# Patient Record
Sex: FEMALE | Race: OTHER | HISPANIC OR LATINO | URBAN - METROPOLITAN AREA
[De-identification: names, ages, dates, MRNs, and addresses within clinical notes are randomized per-mention and may not be internally consistent; named-entity substitution may affect disease eponyms.]

---

## 2018-04-13 ENCOUNTER — EMERGENCY (EMERGENCY)
Facility: HOSPITAL | Age: 83
LOS: 1 days | Discharge: HOME | End: 2018-04-13
Attending: EMERGENCY MEDICINE | Admitting: EMERGENCY MEDICINE
Payer: MEDICAID

## 2018-04-13 VITALS
HEART RATE: 58 BPM | RESPIRATION RATE: 16 BRPM | SYSTOLIC BLOOD PRESSURE: 145 MMHG | DIASTOLIC BLOOD PRESSURE: 79 MMHG | TEMPERATURE: 98 F | OXYGEN SATURATION: 100 %

## 2018-04-13 VITALS
SYSTOLIC BLOOD PRESSURE: 160 MMHG | OXYGEN SATURATION: 100 % | DIASTOLIC BLOOD PRESSURE: 62 MMHG | RESPIRATION RATE: 15 BRPM | TEMPERATURE: 98 F | HEART RATE: 60 BPM

## 2018-04-13 DIAGNOSIS — Z90.49 ACQUIRED ABSENCE OF OTHER SPECIFIED PARTS OF DIGESTIVE TRACT: Chronic | ICD-10-CM

## 2018-04-13 LAB
ALBUMIN SERPL ELPH-MCNC: 4.4 G/DL — SIGNIFICANT CHANGE UP (ref 3.3–5)
ALP SERPL-CCNC: 84 U/L — SIGNIFICANT CHANGE UP (ref 40–120)
ALT FLD-CCNC: 16 U/L — SIGNIFICANT CHANGE UP (ref 4–33)
APPEARANCE UR: CLEAR — SIGNIFICANT CHANGE UP
APTT BLD: 28.8 SEC — SIGNIFICANT CHANGE UP (ref 27.5–37.4)
AST SERPL-CCNC: 20 U/L — SIGNIFICANT CHANGE UP (ref 4–32)
BASOPHILS # BLD AUTO: 0.03 K/UL — SIGNIFICANT CHANGE UP (ref 0–0.2)
BASOPHILS NFR BLD AUTO: 0.2 % — SIGNIFICANT CHANGE UP (ref 0–2)
BILIRUB SERPL-MCNC: 0.4 MG/DL — SIGNIFICANT CHANGE UP (ref 0.2–1.2)
BILIRUB UR-MCNC: NEGATIVE — SIGNIFICANT CHANGE UP
BLOOD UR QL VISUAL: NEGATIVE — SIGNIFICANT CHANGE UP
BUN SERPL-MCNC: 12 MG/DL — SIGNIFICANT CHANGE UP (ref 7–23)
CALCIUM SERPL-MCNC: 9.9 MG/DL — SIGNIFICANT CHANGE UP (ref 8.4–10.5)
CHLORIDE SERPL-SCNC: 101 MMOL/L — SIGNIFICANT CHANGE UP (ref 98–107)
CO2 SERPL-SCNC: 23 MMOL/L — SIGNIFICANT CHANGE UP (ref 22–31)
COLOR SPEC: SIGNIFICANT CHANGE UP
CREAT SERPL-MCNC: 0.73 MG/DL — SIGNIFICANT CHANGE UP (ref 0.5–1.3)
EOSINOPHIL # BLD AUTO: 0.06 K/UL — SIGNIFICANT CHANGE UP (ref 0–0.5)
EOSINOPHIL NFR BLD AUTO: 0.5 % — SIGNIFICANT CHANGE UP (ref 0–6)
GLUCOSE SERPL-MCNC: 125 MG/DL — HIGH (ref 70–99)
GLUCOSE UR-MCNC: NEGATIVE — SIGNIFICANT CHANGE UP
HCT VFR BLD CALC: 41.7 % — SIGNIFICANT CHANGE UP (ref 34.5–45)
HGB BLD-MCNC: 13.6 G/DL — SIGNIFICANT CHANGE UP (ref 11.5–15.5)
IMM GRANULOCYTES # BLD AUTO: 0.05 # — SIGNIFICANT CHANGE UP
IMM GRANULOCYTES NFR BLD AUTO: 0.4 % — SIGNIFICANT CHANGE UP (ref 0–1.5)
INR BLD: 0.91 — SIGNIFICANT CHANGE UP (ref 0.88–1.17)
KETONES UR-MCNC: NEGATIVE — SIGNIFICANT CHANGE UP
LEUKOCYTE ESTERASE UR-ACNC: NEGATIVE — SIGNIFICANT CHANGE UP
LYMPHOCYTES # BLD AUTO: 25 % — SIGNIFICANT CHANGE UP (ref 13–44)
LYMPHOCYTES # BLD AUTO: 3.2 K/UL — SIGNIFICANT CHANGE UP (ref 1–3.3)
MCHC RBC-ENTMCNC: 29.9 PG — SIGNIFICANT CHANGE UP (ref 27–34)
MCHC RBC-ENTMCNC: 32.6 % — SIGNIFICANT CHANGE UP (ref 32–36)
MCV RBC AUTO: 91.6 FL — SIGNIFICANT CHANGE UP (ref 80–100)
MONOCYTES # BLD AUTO: 0.57 K/UL — SIGNIFICANT CHANGE UP (ref 0–0.9)
MONOCYTES NFR BLD AUTO: 4.4 % — SIGNIFICANT CHANGE UP (ref 2–14)
NEUTROPHILS # BLD AUTO: 8.91 K/UL — HIGH (ref 1.8–7.4)
NEUTROPHILS NFR BLD AUTO: 69.5 % — SIGNIFICANT CHANGE UP (ref 43–77)
NITRITE UR-MCNC: NEGATIVE — SIGNIFICANT CHANGE UP
NRBC # FLD: 0 — SIGNIFICANT CHANGE UP
PH UR: 6 — SIGNIFICANT CHANGE UP (ref 4.6–8)
PLATELET # BLD AUTO: 310 K/UL — SIGNIFICANT CHANGE UP (ref 150–400)
PMV BLD: 9.9 FL — SIGNIFICANT CHANGE UP (ref 7–13)
POTASSIUM SERPL-MCNC: 3.7 MMOL/L — SIGNIFICANT CHANGE UP (ref 3.5–5.3)
POTASSIUM SERPL-SCNC: 3.7 MMOL/L — SIGNIFICANT CHANGE UP (ref 3.5–5.3)
PROT SERPL-MCNC: 8.3 G/DL — SIGNIFICANT CHANGE UP (ref 6–8.3)
PROT UR-MCNC: NEGATIVE MG/DL — SIGNIFICANT CHANGE UP
PROTHROM AB SERPL-ACNC: 10.5 SEC — SIGNIFICANT CHANGE UP (ref 9.8–13.1)
RBC # BLD: 4.55 M/UL — SIGNIFICANT CHANGE UP (ref 3.8–5.2)
RBC # FLD: 12.5 % — SIGNIFICANT CHANGE UP (ref 10.3–14.5)
RBC CASTS # UR COMP ASSIST: SIGNIFICANT CHANGE UP (ref 0–?)
SODIUM SERPL-SCNC: 141 MMOL/L — SIGNIFICANT CHANGE UP (ref 135–145)
SP GR SPEC: 1.01 — SIGNIFICANT CHANGE UP (ref 1–1.04)
SQUAMOUS # UR AUTO: SIGNIFICANT CHANGE UP
TROPONIN T SERPL-MCNC: < 0.06 NG/ML — SIGNIFICANT CHANGE UP (ref 0–0.06)
UROBILINOGEN FLD QL: NORMAL MG/DL — SIGNIFICANT CHANGE UP
WBC # BLD: 12.82 K/UL — HIGH (ref 3.8–10.5)
WBC # FLD AUTO: 12.82 K/UL — HIGH (ref 3.8–10.5)
WBC UR QL: SIGNIFICANT CHANGE UP (ref 0–?)

## 2018-04-13 PROCEDURE — 70450 CT HEAD/BRAIN W/O DYE: CPT | Mod: 26

## 2018-04-13 PROCEDURE — 74177 CT ABD & PELVIS W/CONTRAST: CPT | Mod: 26

## 2018-04-13 PROCEDURE — 99284 EMERGENCY DEPT VISIT MOD MDM: CPT

## 2018-04-13 RX ORDER — FAMOTIDINE 10 MG/ML
20 INJECTION INTRAVENOUS DAILY
Qty: 0 | Refills: 0 | Status: DISCONTINUED | OUTPATIENT
Start: 2018-04-13 | End: 2018-04-17

## 2018-04-13 RX ORDER — SODIUM CHLORIDE 9 MG/ML
500 INJECTION INTRAMUSCULAR; INTRAVENOUS; SUBCUTANEOUS ONCE
Qty: 0 | Refills: 0 | Status: COMPLETED | OUTPATIENT
Start: 2018-04-13 | End: 2018-04-13

## 2018-04-13 RX ORDER — ONDANSETRON 8 MG/1
4 TABLET, FILM COATED ORAL ONCE
Qty: 0 | Refills: 0 | Status: COMPLETED | OUTPATIENT
Start: 2018-04-13 | End: 2018-04-13

## 2018-04-13 RX ORDER — ACETAMINOPHEN 500 MG
1000 TABLET ORAL ONCE
Qty: 0 | Refills: 0 | Status: COMPLETED | OUTPATIENT
Start: 2018-04-13 | End: 2018-04-13

## 2018-04-13 RX ORDER — LIDOCAINE 4 G/100G
10 CREAM TOPICAL ONCE
Qty: 0 | Refills: 0 | Status: COMPLETED | OUTPATIENT
Start: 2018-04-13 | End: 2018-04-13

## 2018-04-13 RX ADMIN — FAMOTIDINE 20 MILLIGRAM(S): 10 INJECTION INTRAVENOUS at 18:03

## 2018-04-13 RX ADMIN — LIDOCAINE 10 MILLILITER(S): 4 CREAM TOPICAL at 18:03

## 2018-04-13 RX ADMIN — ONDANSETRON 4 MILLIGRAM(S): 8 TABLET, FILM COATED ORAL at 18:03

## 2018-04-13 RX ADMIN — Medication 30 MILLILITER(S): at 18:03

## 2018-04-13 RX ADMIN — Medication 400 MILLIGRAM(S): at 18:03

## 2018-04-13 RX ADMIN — Medication 1000 MILLIGRAM(S): at 18:40

## 2018-04-13 RX ADMIN — SODIUM CHLORIDE 500 MILLILITER(S): 9 INJECTION INTRAMUSCULAR; INTRAVENOUS; SUBCUTANEOUS at 18:03

## 2018-04-13 NOTE — ED PROVIDER NOTE - PROGRESS NOTE DETAILS
Dr. Lake: Pt states feeling better. Tolerating PO. Given copies of results and advised f/u with GI and Neuro.

## 2018-04-13 NOTE — ED ADULT TRIAGE NOTE - CHIEF COMPLAINT QUOTE
son in law states" My mother in law has a head ache and dizzy and felt almost passed out at home." felt like every thing is becoming dark  for few seconds. patient c/o abdominal pain along with head ache with nausea. denies v/d. FIT called for stroke eval. no code stroke called.

## 2018-04-13 NOTE — ED PROVIDER NOTE - CARE PLAN
Principal Discharge DX:	Headache  Assessment and plan of treatment:	During your ED visit you were evaluated for headache, and abdominal pain. You had blood work and  a ct scan of the abdomen and head. You were provided with the results.  Your CT scan showed a slight dilated common bile duct. Follow up with your pcp within 1 week. Follow up with GI within 1 month if you have continuous symptoms a list has been provide to you . Return to the ED if you exhibit any new, continued or worsening symptoms.  Secondary Diagnosis:	Abdominal pain

## 2018-04-13 NOTE — ED PROVIDER NOTE - ATTENDING CONTRIBUTION TO CARE
Pt was seen and evaluated by me. Pt states today started to get pain to top of head with some dizziness lasting around 20 mins and then started to get RUQ abd pain with nausea. Pt denies any vomiting. Pt denies any fever, chills, vomiting, SOB, chest pain, or diarrhea. Pt denies any eye pain. Lungs CTA b/l. RRR. Abd soft with RUQ abd tenderness. No focal deficits. PERRL.

## 2018-04-13 NOTE — ED PROVIDER NOTE - OBJECTIVE STATEMENT
83 y/o female with PMHx of HTN, HLD, and Glaucoma presents to ED for headache, nausea, and abd pain today. Pt states starting this morning having an achy headache to the top of her head with some dizziness and then RUQ abd pain. Pt notes the headache lasted for around 20 mins the resolved but RUQ abd pain continued. Pt denies any fever, chills, SOB, chest pain, vomiting, or diarrhea. Denies any eye pain or current vision changes. Pt has a history of a cholecystectomy.

## 2018-04-13 NOTE — ED PROVIDER NOTE - PLAN OF CARE
During your ED visit you were evaluated for headache, and abdominal pain. You had blood work and  a ct scan of the abdomen and head. You were provided with the results.  Your CT scan showed a slight dilated common bile duct. Follow up with your pcp within 1 week. Follow up with GI within 1 month if you have continuous symptoms a list has been provide to you . Return to the ED if you exhibit any new, continued or worsening symptoms.

## 2018-04-13 NOTE — ED PROVIDER NOTE - MEDICAL DECISION MAKING DETAILS
83 y/o female with episode of headache with nausea and RUQ abd pain. Concern for gastritis, CBD stone, or other abd etiology. Labs, EKG, CT, UA, Analgesia.

## 2018-04-13 NOTE — ED ADULT NURSE REASSESSMENT NOTE - NS ED NURSE REASSESS COMMENT FT1
pt returned by CT, awake, a/ox3, denies any pain at this time, vitally stable will continue to monitor

## 2019-05-09 ENCOUNTER — INPATIENT (INPATIENT)
Facility: HOSPITAL | Age: 84
LOS: 9 days | Discharge: HOME | DRG: 149 | End: 2019-05-19
Attending: INTERNAL MEDICINE | Admitting: INTERNAL MEDICINE
Payer: MEDICAID

## 2019-05-09 VITALS
OXYGEN SATURATION: 98 % | TEMPERATURE: 97 F | HEART RATE: 60 BPM | RESPIRATION RATE: 18 BRPM | DIASTOLIC BLOOD PRESSURE: 84 MMHG | WEIGHT: 164.91 LBS | SYSTOLIC BLOOD PRESSURE: 174 MMHG

## 2019-05-09 DIAGNOSIS — I10 ESSENTIAL (PRIMARY) HYPERTENSION: ICD-10-CM

## 2019-05-09 DIAGNOSIS — R10.9 UNSPECIFIED ABDOMINAL PAIN: ICD-10-CM

## 2019-05-09 DIAGNOSIS — Z90.49 ACQUIRED ABSENCE OF OTHER SPECIFIED PARTS OF DIGESTIVE TRACT: Chronic | ICD-10-CM

## 2019-05-09 DIAGNOSIS — R42 DIZZINESS AND GIDDINESS: ICD-10-CM

## 2019-05-09 DIAGNOSIS — R74.8 ABNORMAL LEVELS OF OTHER SERUM ENZYMES: ICD-10-CM

## 2019-05-09 DIAGNOSIS — R93.89 ABNORMAL FINDINGS ON DIAGNOSTIC IMAGING OF OTHER SPECIFIED BODY STRUCTURES: ICD-10-CM

## 2019-05-09 DIAGNOSIS — Z29.9 ENCOUNTER FOR PROPHYLACTIC MEASURES, UNSPECIFIED: ICD-10-CM

## 2019-05-09 LAB
ALBUMIN SERPL ELPH-MCNC: 4 G/DL — SIGNIFICANT CHANGE UP (ref 3.5–5)
ALP SERPL-CCNC: 83 U/L — SIGNIFICANT CHANGE UP (ref 40–120)
ALT FLD-CCNC: 23 U/L DA — SIGNIFICANT CHANGE UP (ref 10–60)
ANION GAP SERPL CALC-SCNC: 8 MMOL/L — SIGNIFICANT CHANGE UP (ref 5–17)
APPEARANCE UR: CLEAR — SIGNIFICANT CHANGE UP
AST SERPL-CCNC: 18 U/L — SIGNIFICANT CHANGE UP (ref 10–40)
BASOPHILS # BLD AUTO: 0.04 K/UL — SIGNIFICANT CHANGE UP (ref 0–0.2)
BASOPHILS NFR BLD AUTO: 0.3 % — SIGNIFICANT CHANGE UP (ref 0–2)
BILIRUB SERPL-MCNC: 0.3 MG/DL — SIGNIFICANT CHANGE UP (ref 0.2–1.2)
BILIRUB UR-MCNC: NEGATIVE — SIGNIFICANT CHANGE UP
BUN SERPL-MCNC: 27 MG/DL — HIGH (ref 7–18)
CALCIUM SERPL-MCNC: 9.9 MG/DL — SIGNIFICANT CHANGE UP (ref 8.4–10.5)
CHLORIDE SERPL-SCNC: 108 MMOL/L — SIGNIFICANT CHANGE UP (ref 96–108)
CK MB BLD-MCNC: <3.3 % — SIGNIFICANT CHANGE UP (ref 0–3.5)
CK MB CFR SERPL CALC: <1 NG/ML — SIGNIFICANT CHANGE UP (ref 0–3.6)
CK SERPL-CCNC: 30 U/L — SIGNIFICANT CHANGE UP (ref 21–215)
CK SERPL-CCNC: 34 U/L — SIGNIFICANT CHANGE UP (ref 21–215)
CO2 SERPL-SCNC: 25 MMOL/L — SIGNIFICANT CHANGE UP (ref 22–31)
COLOR SPEC: YELLOW — SIGNIFICANT CHANGE UP
CREAT SERPL-MCNC: 1.05 MG/DL — SIGNIFICANT CHANGE UP (ref 0.5–1.3)
DIFF PNL FLD: NEGATIVE — SIGNIFICANT CHANGE UP
EOSINOPHIL # BLD AUTO: 0.15 K/UL — SIGNIFICANT CHANGE UP (ref 0–0.5)
EOSINOPHIL NFR BLD AUTO: 1.1 % — SIGNIFICANT CHANGE UP (ref 0–6)
GLUCOSE SERPL-MCNC: 136 MG/DL — HIGH (ref 70–99)
GLUCOSE UR QL: NEGATIVE — SIGNIFICANT CHANGE UP
HCT VFR BLD CALC: 40.9 % — SIGNIFICANT CHANGE UP (ref 34.5–45)
HGB BLD-MCNC: 13.5 G/DL — SIGNIFICANT CHANGE UP (ref 11.5–15.5)
IMM GRANULOCYTES NFR BLD AUTO: 0.4 % — SIGNIFICANT CHANGE UP (ref 0–1.5)
KETONES UR-MCNC: NEGATIVE — SIGNIFICANT CHANGE UP
LACTATE SERPL-SCNC: 2.5 MMOL/L — HIGH (ref 0.7–2)
LEUKOCYTE ESTERASE UR-ACNC: ABNORMAL
LIDOCAIN IGE QN: 276 U/L — SIGNIFICANT CHANGE UP (ref 73–393)
LYMPHOCYTES # BLD AUTO: 2.81 K/UL — SIGNIFICANT CHANGE UP (ref 1–3.3)
LYMPHOCYTES # BLD AUTO: 20.3 % — SIGNIFICANT CHANGE UP (ref 13–44)
MCHC RBC-ENTMCNC: 30.1 PG — SIGNIFICANT CHANGE UP (ref 27–34)
MCHC RBC-ENTMCNC: 33 GM/DL — SIGNIFICANT CHANGE UP (ref 32–36)
MCV RBC AUTO: 91.3 FL — SIGNIFICANT CHANGE UP (ref 80–100)
MONOCYTES # BLD AUTO: 1.11 K/UL — HIGH (ref 0–0.9)
MONOCYTES NFR BLD AUTO: 8 % — SIGNIFICANT CHANGE UP (ref 2–14)
NEUTROPHILS # BLD AUTO: 9.68 K/UL — HIGH (ref 1.8–7.4)
NEUTROPHILS NFR BLD AUTO: 69.9 % — SIGNIFICANT CHANGE UP (ref 43–77)
NITRITE UR-MCNC: NEGATIVE — SIGNIFICANT CHANGE UP
NRBC # BLD: 0 /100 WBCS — SIGNIFICANT CHANGE UP (ref 0–0)
PH UR: 7 — SIGNIFICANT CHANGE UP (ref 5–8)
PLATELET # BLD AUTO: 279 K/UL — SIGNIFICANT CHANGE UP (ref 150–400)
POTASSIUM SERPL-MCNC: 3.9 MMOL/L — SIGNIFICANT CHANGE UP (ref 3.5–5.3)
POTASSIUM SERPL-SCNC: 3.9 MMOL/L — SIGNIFICANT CHANGE UP (ref 3.5–5.3)
PROT SERPL-MCNC: 8.1 G/DL — SIGNIFICANT CHANGE UP (ref 6–8.3)
PROT UR-MCNC: NEGATIVE — SIGNIFICANT CHANGE UP
RBC # BLD: 4.48 M/UL — SIGNIFICANT CHANGE UP (ref 3.8–5.2)
RBC # FLD: 12.3 % — SIGNIFICANT CHANGE UP (ref 10.3–14.5)
SODIUM SERPL-SCNC: 141 MMOL/L — SIGNIFICANT CHANGE UP (ref 135–145)
SP GR SPEC: 1.01 — SIGNIFICANT CHANGE UP (ref 1.01–1.02)
TROPONIN I SERPL-MCNC: 0.05 NG/ML — HIGH (ref 0–0.04)
TROPONIN I SERPL-MCNC: 0.07 NG/ML — HIGH (ref 0–0.04)
UROBILINOGEN FLD QL: NEGATIVE — SIGNIFICANT CHANGE UP
WBC # BLD: 13.85 K/UL — HIGH (ref 3.8–10.5)
WBC # FLD AUTO: 13.85 K/UL — HIGH (ref 3.8–10.5)

## 2019-05-09 PROCEDURE — 70450 CT HEAD/BRAIN W/O DYE: CPT | Mod: 26

## 2019-05-09 PROCEDURE — 99285 EMERGENCY DEPT VISIT HI MDM: CPT

## 2019-05-09 PROCEDURE — 74177 CT ABD & PELVIS W/CONTRAST: CPT | Mod: 26

## 2019-05-09 PROCEDURE — 93010 ELECTROCARDIOGRAM REPORT: CPT

## 2019-05-09 RX ORDER — METOCLOPRAMIDE HCL 10 MG
10 TABLET ORAL ONCE
Refills: 0 | Status: COMPLETED | OUTPATIENT
Start: 2019-05-09 | End: 2019-05-09

## 2019-05-09 RX ORDER — MEMANTINE HYDROCHLORIDE 10 MG/1
10 TABLET ORAL DAILY
Refills: 0 | Status: DISCONTINUED | OUTPATIENT
Start: 2019-05-09 | End: 2019-05-19

## 2019-05-09 RX ORDER — DIAZEPAM 5 MG
2 TABLET ORAL ONCE
Refills: 0 | Status: DISCONTINUED | OUTPATIENT
Start: 2019-05-09 | End: 2019-05-09

## 2019-05-09 RX ORDER — ASPIRIN/CALCIUM CARB/MAGNESIUM 324 MG
81 TABLET ORAL DAILY
Refills: 0 | Status: DISCONTINUED | OUTPATIENT
Start: 2019-05-09 | End: 2019-05-19

## 2019-05-09 RX ORDER — SODIUM CHLORIDE 9 MG/ML
1000 INJECTION, SOLUTION INTRAVENOUS ONCE
Refills: 0 | Status: COMPLETED | OUTPATIENT
Start: 2019-05-09 | End: 2019-05-09

## 2019-05-09 RX ORDER — HYDROCHLOROTHIAZIDE 25 MG
12.5 TABLET ORAL DAILY
Refills: 0 | Status: DISCONTINUED | OUTPATIENT
Start: 2019-05-09 | End: 2019-05-09

## 2019-05-09 RX ORDER — ATENOLOL 25 MG/1
50 TABLET ORAL DAILY
Refills: 0 | Status: DISCONTINUED | OUTPATIENT
Start: 2019-05-09 | End: 2019-05-09

## 2019-05-09 RX ORDER — ACETAMINOPHEN 500 MG
1000 TABLET ORAL ONCE
Refills: 0 | Status: COMPLETED | OUTPATIENT
Start: 2019-05-09 | End: 2019-05-09

## 2019-05-09 RX ORDER — DONEPEZIL HYDROCHLORIDE 10 MG/1
10 TABLET, FILM COATED ORAL AT BEDTIME
Refills: 0 | Status: DISCONTINUED | OUTPATIENT
Start: 2019-05-09 | End: 2019-05-19

## 2019-05-09 RX ORDER — DIPHENHYDRAMINE HCL 50 MG
25 CAPSULE ORAL ONCE
Refills: 0 | Status: COMPLETED | OUTPATIENT
Start: 2019-05-09 | End: 2019-05-09

## 2019-05-09 RX ORDER — ENOXAPARIN SODIUM 100 MG/ML
40 INJECTION SUBCUTANEOUS DAILY
Refills: 0 | Status: DISCONTINUED | OUTPATIENT
Start: 2019-05-09 | End: 2019-05-19

## 2019-05-09 RX ORDER — FAMOTIDINE 10 MG/ML
20 INJECTION INTRAVENOUS ONCE
Refills: 0 | Status: COMPLETED | OUTPATIENT
Start: 2019-05-09 | End: 2019-05-09

## 2019-05-09 RX ORDER — MECLIZINE HCL 12.5 MG
25 TABLET ORAL ONCE
Refills: 0 | Status: COMPLETED | OUTPATIENT
Start: 2019-05-09 | End: 2019-05-09

## 2019-05-09 RX ORDER — AMLODIPINE BESYLATE 2.5 MG/1
10 TABLET ORAL DAILY
Refills: 0 | Status: DISCONTINUED | OUTPATIENT
Start: 2019-05-09 | End: 2019-05-09

## 2019-05-09 RX ORDER — LOSARTAN POTASSIUM 100 MG/1
100 TABLET, FILM COATED ORAL DAILY
Refills: 0 | Status: DISCONTINUED | OUTPATIENT
Start: 2019-05-09 | End: 2019-05-09

## 2019-05-09 RX ORDER — ATORVASTATIN CALCIUM 80 MG/1
80 TABLET, FILM COATED ORAL AT BEDTIME
Refills: 0 | Status: DISCONTINUED | OUTPATIENT
Start: 2019-05-09 | End: 2019-05-19

## 2019-05-09 RX ADMIN — Medication 2 MILLIGRAM(S): at 21:17

## 2019-05-09 RX ADMIN — FAMOTIDINE 20 MILLIGRAM(S): 10 INJECTION INTRAVENOUS at 16:50

## 2019-05-09 RX ADMIN — Medication 25 MILLIGRAM(S): at 16:50

## 2019-05-09 RX ADMIN — Medication 400 MILLIGRAM(S): at 21:17

## 2019-05-09 RX ADMIN — Medication 30 MILLILITER(S): at 16:50

## 2019-05-09 RX ADMIN — Medication 104 MILLIGRAM(S): at 16:50

## 2019-05-09 NOTE — H&P ADULT - PROBLEM SELECTOR PLAN 4
CT A/P: 3.8 cm indeterminate hypodense lesion in the left kidney with a Hounsfield unit of 28.  will get follow up [facility]

## 2019-05-09 NOTE — ED PROVIDER NOTE - PROGRESS NOTE DETAILS
feeling slightly better, able to open eyes, intact finger to nose, awake alert, still feeling room spinning. elevated troponin. admit for neuro and cardiac workup. NIHSS ) passed dysphagia eval

## 2019-05-09 NOTE — ED PROVIDER NOTE - NEUROLOGICAL, MLM
Alert and oriented, no focal deficits, no motor or sensory deficits. Strength/Sensation intact in upper and lower extremities.

## 2019-05-09 NOTE — H&P ADULT - NSHPLABSRESULTS_GEN_ALL_CORE
Vital Signs Last 24 Hrs  T(C): 36.3 (09 May 2019 19:21), Max: 36.3 (09 May 2019 15:53)  T(F): 97.3 (09 May 2019 19:21), Max: 97.3 (09 May 2019 15:53)  HR: 72 (09 May 2019 19:21) (60 - 72)  BP: 178/74 (09 May 2019 19:21) (174/84 - 178/74)  BP(mean): --  RR: 17 (09 May 2019 19:21) (17 - 18)  SpO2: 100% (09 May 2019 19:21) (98% - 100%)                            13.5   13.85 )-----------( 279      ( 09 May 2019 16:56 )             40.9           141  |  108  |  27<H>  ----------------------------<  136<H>  3.9   |  25  |  1.05    Ca    9.9      09 May 2019 16:56    TPro  8.1  /  Alb  4.0  /  TBili  0.3  /  DBili  x   /  AST  18  /  ALT  23  /  AlkPhos  83  0509              Urinalysis Basic - ( 09 May 2019 19:02 )    Color: Yellow / Appearance: Clear / S.010 / pH: x  Gluc: x / Ketone: Negative  / Bili: Negative / Urobili: Negative   Blood: x / Protein: Negative / Nitrite: Negative   Leuk Esterase: Trace / RBC: 0-2 /HPF / WBC 6-10 /HPF   Sq Epi: x / Non Sq Epi: Few /HPF / Bacteria: Few /HPF      Lactate Trend   @ 16:56 Lactate:2.5       CARDIAC MARKERS ( 09 May 2019 16:56 )  0.054 ng/mL / x     / 34 U/L / x     / x        < from: CT Head No Cont (19 @ 20:27) >    IMPRESSION:  Mild chronic microvascular changes without evidence of an   acute transcortical infarction or hemorrhage. MR is a more sensitive   imaging modality for the evaluation of an acute infarction.     < end of copied text >    < from: CT Abdomen and Pelvis w/ IV Cont (19 @ 20:31) >    Impression: Hepatic steatosis.    3.8 cm indeterminate hypodense lesion in the left kidney with a   Hounsfield unit of 28. Renal ultrasound is recommended for further   evaluation.    The appendix appears normal. Colonic diverticulosis without evidence for   diverticulitis. Small hiatal hernia. No bowel obstruction, or grossly   thickened bowel wall.    The endometrium appears prominent. Pelvic ultrasound is recommended for   further evaluation.      < end of copied text >

## 2019-05-09 NOTE — ED ADULT NURSE NOTE - OBJECTIVE STATEMENT
Patient complains of right abdominal pain and headache that started a few hours ago along with generalized weakness and nausea.

## 2019-05-09 NOTE — H&P ADULT - ASSESSMENT
84/F form home lives with daughter independent at baseline with PMH of dementia, HTN, HLD presented with dizziness and upper abdominal pain. Patient reports that today she started having dizziness which she describes as room spinning sensation associated with nausea, worsening with head movement, improving with lying down and closed eyes, she had similar episode in past for which she was told that its form ear problems and which improved with medications.  Denies episode of vomiting, fall, ear pain or discharge. She also mentions epigastric colicky pain which is 8/10 in intensity, no aggravating or reliving factors. Denies vomiting, diarrhea, SOB, SCHWARTZ, palpitations, headache, cough, wheezing, joint pain or swelling, fever, chills.      ED course:  Vitals: 178/74, 72, 97.3, 17 (100)  Labs pertinent for WBC of 13.85  T1 of 0.054  UA negative   CT A/P: 3.8 cm indeterminate hypodense lesion in the left kidney with a Hounsfield unit of 28.  CT  head: Mild chronic microvascular changes without evidence of an acute transcortical infarction or hemorrhage.  S/p tylenol IV, maalox, valium 2 mg, Benadryl 25 mg, famotidine, meclizine, reglan given in ed

## 2019-05-09 NOTE — H&P ADULT - NSHPPHYSICALEXAM_GEN_ALL_CORE
· Constitutional	Well-developed, well nourished  · Eyes	conjuctivae clear  · ENMT	No oral lesions; no gross abnormalities  · Neck	No bruits; no thyromegaly or nodules   · Back	No deformity or limitation of movement   · Respiratory	Breath Sounds equal & clear to percussion & auscultation, no accessory muscle use  · Cardiovascular	Regular rate & rhythm, normal S1, S2; no murmurs, gallops or rubs; no S3, S4  · Gastrointestinal	Soft, epigastric abdominal tenderness   · Extremities	no clubbing, cyanosis or edema   · Neurological	Alert & oriented; no sensory, motor or coordination deficits, nystagmus negative   · Musculoskeletal	No joint pain, swelling or deformity; no limitation of movement

## 2019-05-09 NOTE — ED PROVIDER NOTE - OBJECTIVE STATEMENT
85 y/o F with PMHx of HTN and PSHx of Cholecystectomy c/o upper abdominal pain, "feeling gassy and room spinning sensation" x today. Pt relates being unable to open eyes due to feeling nauseous/dizzy. Pt does not have a h/o similar sxs. Pt denies any other acute complaints. NKDA.

## 2019-05-09 NOTE — ED ADULT NURSE REASSESSMENT NOTE - NS ED NURSE REASSESS COMMENT FT1
received pt sleeping, not in acute distress, saline lock intact, waiting for CT scan, grand daughter at bedside.

## 2019-05-09 NOTE — ED ADULT NURSE NOTE - NSIMPLEMENTINTERV_GEN_ALL_ED
Implemented All Universal Safety Interventions:  [place] to call system. Call bell, personal items and telephone within reach. Instruct patient to call for assistance. Room bathroom lighting operational. Non-slip footwear when patient is off stretcher. Physically safe environment: no spills, clutter or unnecessary equipment. Stretcher in lowest position, wheels locked, appropriate side rails in place.

## 2019-05-09 NOTE — ED ADULT NURSE REASSESSMENT NOTE - NS ED NURSE REASSESS COMMENT FT1
received pt awake alert not in distress,with saline lock intact no redness no swelling noted,with daughter at bedside.waitng for dispo.

## 2019-05-09 NOTE — H&P ADULT - PROBLEM SELECTOR PLAN 2
epigastric abdominal pain - colicky with bloating   CT abdomen suggestive of kidney lesion - will get renal [facility]   will start on maalox

## 2019-05-09 NOTE — H&P ADULT - PROBLEM SELECTOR PLAN 1
presented with room spinning sensation, worsening with head movement and improvement with rest and closing eyes  nystagmus negative and orthostatic negative on lying down and sitting  central vs veritgo    CT head: Mild chronic microvascular changes without evidence of an acute transcortical infarction or hemorrhage.  T1 0.054, T2 of 0.067 trend serial cardiac enzymes   EKG- NSR no stt wave changes  follow ECHO, Carotid doppler   neuro consult in AM  will hold HTN medications for permissive HTN  starting on aspirin, atorvastatin and meclizine standing   tele monitoring and neruo checks   PT consult

## 2019-05-10 DIAGNOSIS — R42 DIZZINESS AND GIDDINESS: ICD-10-CM

## 2019-05-10 LAB
24R-OH-CALCIDIOL SERPL-MCNC: 24.9 NG/ML — LOW (ref 30–80)
ANION GAP SERPL CALC-SCNC: 9 MMOL/L — SIGNIFICANT CHANGE UP (ref 5–17)
BASOPHILS # BLD AUTO: 0.03 K/UL — SIGNIFICANT CHANGE UP (ref 0–0.2)
BASOPHILS NFR BLD AUTO: 0.3 % — SIGNIFICANT CHANGE UP (ref 0–2)
BUN SERPL-MCNC: 17 MG/DL — SIGNIFICANT CHANGE UP (ref 7–18)
CALCIUM SERPL-MCNC: 8.7 MG/DL — SIGNIFICANT CHANGE UP (ref 8.4–10.5)
CHLORIDE SERPL-SCNC: 111 MMOL/L — HIGH (ref 96–108)
CHOLEST SERPL-MCNC: 198 MG/DL — SIGNIFICANT CHANGE UP (ref 10–199)
CK MB BLD-MCNC: <3.8 % — HIGH (ref 0–3.5)
CK MB CFR SERPL CALC: <1 NG/ML — SIGNIFICANT CHANGE UP (ref 0–3.6)
CK SERPL-CCNC: 26 U/L — SIGNIFICANT CHANGE UP (ref 21–215)
CO2 SERPL-SCNC: 25 MMOL/L — SIGNIFICANT CHANGE UP (ref 22–31)
CREAT SERPL-MCNC: 0.85 MG/DL — SIGNIFICANT CHANGE UP (ref 0.5–1.3)
CULTURE RESULTS: SIGNIFICANT CHANGE UP
EOSINOPHIL # BLD AUTO: 0.04 K/UL — SIGNIFICANT CHANGE UP (ref 0–0.5)
EOSINOPHIL NFR BLD AUTO: 0.4 % — SIGNIFICANT CHANGE UP (ref 0–6)
FOLATE SERPL-MCNC: 17.1 NG/ML — SIGNIFICANT CHANGE UP
GLUCOSE SERPL-MCNC: 110 MG/DL — HIGH (ref 70–99)
HBA1C BLD-MCNC: 5.8 % — HIGH (ref 4–5.6)
HCT VFR BLD CALC: 39.6 % — SIGNIFICANT CHANGE UP (ref 34.5–45)
HDLC SERPL-MCNC: 71 MG/DL — SIGNIFICANT CHANGE UP
HGB BLD-MCNC: 12.9 G/DL — SIGNIFICANT CHANGE UP (ref 11.5–15.5)
IMM GRANULOCYTES NFR BLD AUTO: 0.4 % — SIGNIFICANT CHANGE UP (ref 0–1.5)
LACTATE SERPL-SCNC: 2.2 MMOL/L — HIGH (ref 0.7–2)
LACTATE SERPL-SCNC: 2.6 MMOL/L — HIGH (ref 0.7–2)
LIPID PNL WITH DIRECT LDL SERPL: 100 MG/DL — SIGNIFICANT CHANGE UP
LYMPHOCYTES # BLD AUTO: 2.97 K/UL — SIGNIFICANT CHANGE UP (ref 1–3.3)
LYMPHOCYTES # BLD AUTO: 29.1 % — SIGNIFICANT CHANGE UP (ref 13–44)
MAGNESIUM SERPL-MCNC: 2.1 MG/DL — SIGNIFICANT CHANGE UP (ref 1.6–2.6)
MCHC RBC-ENTMCNC: 29.7 PG — SIGNIFICANT CHANGE UP (ref 27–34)
MCHC RBC-ENTMCNC: 32.6 GM/DL — SIGNIFICANT CHANGE UP (ref 32–36)
MCV RBC AUTO: 91.2 FL — SIGNIFICANT CHANGE UP (ref 80–100)
MONOCYTES # BLD AUTO: 0.83 K/UL — SIGNIFICANT CHANGE UP (ref 0–0.9)
MONOCYTES NFR BLD AUTO: 8.1 % — SIGNIFICANT CHANGE UP (ref 2–14)
NEUTROPHILS # BLD AUTO: 6.29 K/UL — SIGNIFICANT CHANGE UP (ref 1.8–7.4)
NEUTROPHILS NFR BLD AUTO: 61.7 % — SIGNIFICANT CHANGE UP (ref 43–77)
NRBC # BLD: 0 /100 WBCS — SIGNIFICANT CHANGE UP (ref 0–0)
PHOSPHATE SERPL-MCNC: 3.6 MG/DL — SIGNIFICANT CHANGE UP (ref 2.5–4.5)
PLATELET # BLD AUTO: 289 K/UL — SIGNIFICANT CHANGE UP (ref 150–400)
POTASSIUM SERPL-MCNC: 3.4 MMOL/L — LOW (ref 3.5–5.3)
POTASSIUM SERPL-SCNC: 3.4 MMOL/L — LOW (ref 3.5–5.3)
RBC # BLD: 4.34 M/UL — SIGNIFICANT CHANGE UP (ref 3.8–5.2)
RBC # FLD: 12.5 % — SIGNIFICANT CHANGE UP (ref 10.3–14.5)
SODIUM SERPL-SCNC: 145 MMOL/L — SIGNIFICANT CHANGE UP (ref 135–145)
SPECIMEN SOURCE: SIGNIFICANT CHANGE UP
TOTAL CHOLESTEROL/HDL RATIO MEASUREMENT: 2.8 RATIO — LOW (ref 3.3–7.1)
TRIGL SERPL-MCNC: 135 MG/DL — SIGNIFICANT CHANGE UP (ref 10–149)
TROPONIN I SERPL-MCNC: 0.07 NG/ML — HIGH (ref 0–0.04)
TSH SERPL-MCNC: 1.07 UU/ML — SIGNIFICANT CHANGE UP (ref 0.34–4.82)
VIT B12 SERPL-MCNC: 625 PG/ML — SIGNIFICANT CHANGE UP (ref 232–1245)
WBC # BLD: 10.2 K/UL — SIGNIFICANT CHANGE UP (ref 3.8–10.5)
WBC # FLD AUTO: 10.2 K/UL — SIGNIFICANT CHANGE UP (ref 3.8–10.5)

## 2019-05-10 PROCEDURE — 93880 EXTRACRANIAL BILAT STUDY: CPT | Mod: 26

## 2019-05-10 PROCEDURE — 99223 1ST HOSP IP/OBS HIGH 75: CPT

## 2019-05-10 RX ORDER — ONDANSETRON 8 MG/1
4 TABLET, FILM COATED ORAL ONCE
Refills: 0 | Status: COMPLETED | OUTPATIENT
Start: 2019-05-10 | End: 2019-05-10

## 2019-05-10 RX ORDER — HYDRALAZINE HCL 50 MG
10 TABLET ORAL ONCE
Refills: 0 | Status: COMPLETED | OUTPATIENT
Start: 2019-05-10 | End: 2019-05-10

## 2019-05-10 RX ORDER — CHOLECALCIFEROL (VITAMIN D3) 125 MCG
1000 CAPSULE ORAL DAILY
Refills: 0 | Status: DISCONTINUED | OUTPATIENT
Start: 2019-05-10 | End: 2019-05-19

## 2019-05-10 RX ORDER — SODIUM CHLORIDE 9 MG/ML
1000 INJECTION, SOLUTION INTRAVENOUS ONCE
Refills: 0 | Status: COMPLETED | OUTPATIENT
Start: 2019-05-10 | End: 2019-05-10

## 2019-05-10 RX ORDER — POTASSIUM CHLORIDE 20 MEQ
40 PACKET (EA) ORAL ONCE
Refills: 0 | Status: DISCONTINUED | OUTPATIENT
Start: 2019-05-10 | End: 2019-05-10

## 2019-05-10 RX ORDER — CEFTRIAXONE 500 MG/1
1 INJECTION, POWDER, FOR SOLUTION INTRAMUSCULAR; INTRAVENOUS EVERY 24 HOURS
Refills: 0 | Status: DISCONTINUED | OUTPATIENT
Start: 2019-05-11 | End: 2019-05-18

## 2019-05-10 RX ORDER — CEFTRIAXONE 500 MG/1
INJECTION, POWDER, FOR SOLUTION INTRAMUSCULAR; INTRAVENOUS
Refills: 0 | Status: DISCONTINUED | OUTPATIENT
Start: 2019-05-10 | End: 2019-05-18

## 2019-05-10 RX ORDER — CEFTRIAXONE 500 MG/1
1 INJECTION, POWDER, FOR SOLUTION INTRAMUSCULAR; INTRAVENOUS ONCE
Refills: 0 | Status: COMPLETED | OUTPATIENT
Start: 2019-05-10 | End: 2019-05-10

## 2019-05-10 RX ORDER — POTASSIUM CHLORIDE 20 MEQ
40 PACKET (EA) ORAL ONCE
Refills: 0 | Status: COMPLETED | OUTPATIENT
Start: 2019-05-10 | End: 2019-05-10

## 2019-05-10 RX ORDER — MECLIZINE HCL 12.5 MG
25 TABLET ORAL EVERY 8 HOURS
Refills: 0 | Status: DISCONTINUED | OUTPATIENT
Start: 2019-05-10 | End: 2019-05-13

## 2019-05-10 RX ORDER — ONDANSETRON 8 MG/1
4 TABLET, FILM COATED ORAL EVERY 8 HOURS
Refills: 0 | Status: COMPLETED | OUTPATIENT
Start: 2019-05-10 | End: 2019-05-12

## 2019-05-10 RX ORDER — PANTOPRAZOLE SODIUM 20 MG/1
40 TABLET, DELAYED RELEASE ORAL DAILY
Refills: 0 | Status: DISCONTINUED | OUTPATIENT
Start: 2019-05-10 | End: 2019-05-13

## 2019-05-10 RX ADMIN — CEFTRIAXONE 100 GRAM(S): 500 INJECTION, POWDER, FOR SOLUTION INTRAMUSCULAR; INTRAVENOUS at 12:18

## 2019-05-10 RX ADMIN — SODIUM CHLORIDE 2000 MILLILITER(S): 9 INJECTION, SOLUTION INTRAVENOUS at 08:14

## 2019-05-10 RX ADMIN — Medication 40 MILLIEQUIVALENT(S): at 08:13

## 2019-05-10 RX ADMIN — Medication 10 MILLIGRAM(S): at 13:51

## 2019-05-10 RX ADMIN — Medication 25 MILLIGRAM(S): at 21:43

## 2019-05-10 RX ADMIN — ATORVASTATIN CALCIUM 80 MILLIGRAM(S): 80 TABLET, FILM COATED ORAL at 21:43

## 2019-05-10 RX ADMIN — ONDANSETRON 4 MILLIGRAM(S): 8 TABLET, FILM COATED ORAL at 14:53

## 2019-05-10 RX ADMIN — Medication 1000 MILLIGRAM(S): at 00:10

## 2019-05-10 RX ADMIN — PANTOPRAZOLE SODIUM 40 MILLIGRAM(S): 20 TABLET, DELAYED RELEASE ORAL at 17:20

## 2019-05-10 RX ADMIN — DONEPEZIL HYDROCHLORIDE 10 MILLIGRAM(S): 10 TABLET, FILM COATED ORAL at 21:43

## 2019-05-10 RX ADMIN — Medication 30 MILLILITER(S): at 21:43

## 2019-05-10 RX ADMIN — MEMANTINE HYDROCHLORIDE 10 MILLIGRAM(S): 10 TABLET ORAL at 12:19

## 2019-05-10 RX ADMIN — ENOXAPARIN SODIUM 40 MILLIGRAM(S): 100 INJECTION SUBCUTANEOUS at 12:19

## 2019-05-10 RX ADMIN — SODIUM CHLORIDE 500 MILLILITER(S): 9 INJECTION, SOLUTION INTRAVENOUS at 00:29

## 2019-05-10 NOTE — CHART NOTE - NSCHARTNOTEFT_GEN_A_CORE
Pt c/o persistent constant epigastric pain and nausea for past 2 days. Just had an episode of NBNB emesis. Recent CT A/P did not show any significant intraabdominal findings. Pt had a BM this AM, no change in stool. Pt's pain likely from gastritis vs PUD. Pt started on clear liquid diet, Protonix IV started, Zofran IV PRN ordered.

## 2019-05-10 NOTE — CONSULT NOTE ADULT - SUBJECTIVE AND OBJECTIVE BOX
Patient is a 84y old  Female who presents with a chief complaint of dizziness and upper abdominal pain (10 May 2019 10:40)      HPI:  84/F form home lives with daughter independent at baseline with PMH of dementia, HTN, HLD presented with dizziness and upper abdominal pain. Patient reports that today she started having dizziness which she describes as room spinning sensation associated with nausea, worsening with head movement, improving with lying down and closed eyes, she had similar episode in past for which she was told that its form ear problems and which improved with medications.  Denies episode of vomiting, fall, ear pain or discharge. She also mentions epigastric colicky pain which is 8/10 in intensity, no aggravating or reliving factors. Denies vomiting, diarrhea, SOB, SCHWARTZ, palpitations, headache, cough, wheezing, joint pain or swelling, fever, chills. (09 May 2019 22:33)         Neurological Review of Systems:  No difficulty with language.  No vision loss or double vision.  No dizziness, vertigo or new hearing loss.  No difficulty with speech or swallowing.  No focal weakness.  No focal sensory changes.  No numbness or tingling in the bilateral lower extremities.  No difficulty with balance.  No difficulty with ambulation.        MEDICATIONS  (STANDING):  aspirin enteric coated 81 milliGRAM(s) Oral daily  atorvastatin 80 milliGRAM(s) Oral at bedtime  cefTRIAXone   IVPB 1 Gram(s) IV Intermittent once  cefTRIAXone   IVPB      donepezil 10 milliGRAM(s) Oral at bedtime  enoxaparin Injectable 40 milliGRAM(s) SubCutaneous daily  meclizine 25 milliGRAM(s) Oral every 8 hours  memantine 10 milliGRAM(s) Oral daily    MEDICATIONS  (PRN):    Allergies    No Known Allergies    Intolerances      PAST MEDICAL & SURGICAL HISTORY:  HTN (hypertension)  History of cholecystectomy    FAMILY HISTORY:  No pertinent family history in first degree relatives    SOCIAL HISTORY: non smoker/ former smoker/ active smoker    Review of Systems:  Constitutional: No generalized weakness. No fevers or chills.                    Eyes, Ears, Mouth, Throat: No vision loss   Respiratory: No shortness of breath or cough.                                Cardiovascular: No chest pain or palpitations  Gastrointestinal: No nausea or vomiting.                                         Genitourinary: No urinary incontinence or burning on urination.  Musculoskeletal: No joint pain.                                                           Dermatologic: No rash.  Neurological: as per HPI                                                                      Psychiatric: No behavioral problems.  Endocrine: No known hypoglycemia.               Hematologic/Lymphatic: No easy bleeding.    O:  Vital Signs Last 24 Hrs  T(C): 36.6 (10 May 2019 11:35), Max: 36.8 (10 May 2019 08:02)  T(F): 97.9 (10 May 2019 11:35), Max: 98.2 (10 May 2019 08:02)  HR: 73 (10 May 2019 11:35) (60 - 75)  BP: 178/73 (10 May 2019 11:35) (151/71 - 178/74)  BP(mean): --  RR: 17 (10 May 2019 11:35) (17 - 18)  SpO2: 100% (10 May 2019 11:35) (98% - 100%)    General Exam:   General appearance: No acute distress                 Cardiovascular: Pedal dorsalis pulses intact bilaterally    Mental Status: Orientated to self, date and place.  Attention intact.  No dysarthria, aphasia or neglect.  Knowledge intact.  Registration intact.  Short and long term memory grossly intact.      Cranial Nerves: CN I - not tested.  PERRL, EOMI, VFF, no nystagmus or diplopia.  No APD.  Fundi not visualized.  CN V1-3 intact to light touch and pinprick.  No facial asymmetry.  Hearing intact to finger rub bilaterally.  Tongue, uvula and palate midline.  Sternocleidomastoid and Trapezius intact bilaterally.    Motor:   Tone: normal.                  Strength intact throughout  No pronator drift bilaterally                      No dysmetria on finger-nose-finger or heel-shin-heel  No truncal ataxia.  No resting, postural or action tremor.  No myoclonus.    Sensation: intact to light touch, pinprick, vibration and proprioception    Deep Tendon Reflexes: 1+ bilateral biceps, triceps, brachioradialis, knee and ankle  Toes flexor bilaterally    Gait: normal and stable.  Rhomberg -shemar.    Other:     LABS:                        12.9   10.20 )-----------( 289      ( 10 May 2019 06:04 )             39.6     05-10    145  |  111<H>  |  17  ----------------------------<  110<H>  3.4<L>   |  25  |  0.85    Ca    8.7      10 May 2019 06:04  Phos  3.6     05-10  Mg     2.1     05-10    TPro  8.1  /  Alb  4.0  /  TBili  0.3  /  DBili  x   /  AST  18  /  ALT  23  /  AlkPhos  83        Urinalysis Basic - ( 09 May 2019 19:02 )    Color: Yellow / Appearance: Clear / S.010 / pH: x  Gluc: x / Ketone: Negative  / Bili: Negative / Urobili: Negative   Blood: x / Protein: Negative / Nitrite: Negative   Leuk Esterase: Trace / RBC: 0-2 /HPF / WBC 6-10 /HPF   Sq Epi: x / Non Sq Epi: Few /HPF / Bacteria: Few /HPF          RADIOLOGY & ADDITIONAL STUDIES:    EKG:  < from: CT Head No Cont (19 @ 20:27) > (images reviwed)  IMPRESSION:  Mild chronic microvascular changes without evidence of an   acute transcortical infarction or hemorrhage. MR is a more sensitive   imaging modality for the evaluation of an acute infarction.     < end of copied text >    < from: CT Abdomen and Pelvis w/ IV Cont (19 @ 20:31) >  Impression: Hepatic steatosis.    3.8 cm indeterminate hypodense lesion in the left kidney with a   Hounsfield unit of 28. Renal ultrasound is recommended for further   evaluation.    The appendix appears normal. Colonic diverticulosis without evidence for   diverticulitis. Small hiatal hernia. No bowel obstruction, or grossly   thickened bowel wall.    The endometrium appears prominent. Pelvic ultrasound is recommended for   further evaluation.    < end of copied text > Patient is a 84y old  Female who presents with a chief complaint of dizziness and upper abdominal pain (10 May 2019 10:40)   [id number]    HPI:  84/F form home lives with daughter independent at baseline with PMH of dementia, HTN, HLD presented with dizziness and upper abdominal pain neurology called for vertigo.. Patient started having dizziness which she describes as room spinning sensation associated with nausea, worsening with head movement, improving with lying down and closed eyes, she had similar episode in past for which she was told that its form ear problems and which improved with medications.  No recent infections, hearing loss or tinnitus     Has epigastric colicky pain which is 8/10 in intensity, no aggravating or reliving factors. Denies vomiting, diarrhea, SOB, SCHWARTZ, palpitations, headache, cough, wheezing, joint pain or swelling, fever, chills. (09 May 2019 22:33)    Neurological Review of Systems:  No difficulty with language.  No vision loss or double vision.  No dizziness or new hearing loss.  No difficulty with speech or swallowing.  No focal weakness.  No focal sensory changes.  No numbness or tingling in the bilateral lower extremities.  + difficulty with balance.  No difficulty with ambulation.        MEDICATIONS  (STANDING):  aspirin enteric coated 81 milliGRAM(s) Oral daily  atorvastatin 80 milliGRAM(s) Oral at bedtime  cefTRIAXone   IVPB 1 Gram(s) IV Intermittent once  cefTRIAXone   IVPB      donepezil 10 milliGRAM(s) Oral at bedtime  enoxaparin Injectable 40 milliGRAM(s) SubCutaneous daily  meclizine 25 milliGRAM(s) Oral every 8 hours  memantine 10 milliGRAM(s) Oral daily    MEDICATIONS  (PRN):    Allergies    No Known Allergies    Intolerances      PAST MEDICAL & SURGICAL HISTORY:  HTN (hypertension)  History of cholecystectomy    FAMILY HISTORY:  No pertinent family history in first degree relatives    SOCIAL HISTORY: non smoker    Review of Systems:  Constitutional:  No fevers.                    Eyes, Ears, Mouth, Throat: No vision loss   Respiratory: No cough.                                Cardiovascular: No chest pain.  Gastrointestinal: No vomiting.                                         Genitourinary: No urinary incontinence.  Musculoskeletal: No joint pain.                                                           Dermatologic: No rash.  Neurological: as per HPI                                                                      Psychiatric: No behavioral problems.  Endocrine: No known hypoglycemia.               Hematologic/Lymphatic: No easy bleeding.    O:  Vital Signs Last 24 Hrs  T(C): 36.6 (10 May 2019 11:35), Max: 36.8 (10 May 2019 08:02)  T(F): 97.9 (10 May 2019 11:35), Max: 98.2 (10 May 2019 08:02)  HR: 73 (10 May 2019 11:35) (60 - 75)  BP: 178/73 (10 May 2019 11:35) (151/71 - 178/74)  BP(mean): --  RR: 17 (10 May 2019 11:35) (17 - 18)  SpO2: 100% (10 May 2019 11:35) (98% - 100%)    General Exam:   General appearance: No acute distress                 Cardiovascular: Pedal dorsalis pulses intact bilaterally    Mental Status: Orientated to self, date and place.  Attention intact.  No dysarthria, aphasia or neglect.  Knowledge intact.  Registration intact.  Short and long term memory grossly intact.      Cranial Nerves: CN I - not tested.  PERRL, EOMI, VFF, no nystagmus or diplopia.  No APD.  Fundi not visualized.  CN V1-3 intact to light touch.  No facial asymmetry.  Hearing intact to finger rub bilaterally.  Tongue, uvula and palate midline.  Sternocleidomastoid and Trapezius intact bilaterally.    Motor:   Tone: normal.                  Strength intact throughout  No pronator drift bilaterally                      No dysmetria on finger-nose-finger or heel-shin-heel    Sensation: intact to light touch    Deep Tendon Reflexes: 1+ bilateral biceps, triceps, brachioradialis, trace knee and ankle  Toes flexor bilaterally    Gait: patient declines    Other: unable to do eply, patient declines    LABS:                        12.9   10.20 )-----------( 289      ( 10 May 2019 06:04 )             39.6     05-10    145  |  111<H>  |  17  ----------------------------<  110<H>  3.4<L>   |  25  |  0.85    Ca    8.7      10 May 2019 06:04  Phos  3.6     05-10  Mg     2.1     05-10    TPro  8.1  /  Alb  4.0  /  TBili  0.3  /  DBili  x   /  AST  18  /  ALT  23  /  AlkPhos  83  05-09      Urinalysis Basic - ( 09 May 2019 19:02 )    Color: Yellow / Appearance: Clear / S.010 / pH: x  Gluc: x / Ketone: Negative  / Bili: Negative / Urobili: Negative   Blood: x / Protein: Negative / Nitrite: Negative   Leuk Esterase: Trace / RBC: 0-2 /HPF / WBC 6-10 /HPF   Sq Epi: x / Non Sq Epi: Few /HPF / Bacteria: Few /HPF          RADIOLOGY & ADDITIONAL STUDIES:    EKG: Sinus nicole  < from: CT Head No Cont (19 @ 20:27) > (images reviwed)  IMPRESSION:  Mild chronic microvascular changes without evidence of an   acute transcortical infarction or hemorrhage. MR is a more sensitive   imaging modality for the evaluation of an acute infarction.     < end of copied text >    < from: CT Abdomen and Pelvis w/ IV Cont (19 @ 20:31) >  Impression: Hepatic steatosis.    3.8 cm indeterminate hypodense lesion in the left kidney with a   Hounsfield unit of 28. Renal ultrasound is recommended for further   evaluation.    The appendix appears normal. Colonic diverticulosis without evidence for   diverticulitis. Small hiatal hernia. No bowel obstruction, or grossly   thickened bowel wall.    The endometrium appears prominent. Pelvic ultrasound is recommended for   further evaluation.    < end of copied text >

## 2019-05-10 NOTE — PROGRESS NOTE ADULT - PROBLEM SELECTOR PLAN 1
presented with room spinning sensation, worsening with head movement and improvement with rest and closing eyes  nystagmus negative and orthostatic negative on lying down and sitting  central vs veritgo    CT head: Mild chronic microvascular changes without evidence of an acute transcortical infarction or hemorrhage.  T1 0.054, T2 of 0.067 trend serial cardiac enzymes   EKG- NSR no stt wave changes  follow ECHO, Carotid doppler   neuro consult in AM  will hold HTN medications for permissive HTN  starting on aspirin, atorvastatin and meclizine standing   tele monitoring and neruo checks   PT consult presented with room spinning sensation, worsening with head movement and improvement with rest and closing eyes  nystagmus negative and orthostatic negative on lying down and sitting  central vs veritgo    CT head: Mild chronic microvascular changes without evidence of an acute transcortical infarction or hemorrhage.  T1 0.054, T2 of 0.067 trend serial cardiac enzymes   EKG - NSR no stt wave changes  follow ECHO, Carotid doppler   neuro consult in AM  will hold HTN medications for permissive HTN  starting on aspirin, atorvastatin and meclizine standing   tele monitoring and neruo checks   PT consult  CD showed no hemodynamically significant internal carotid artery stenosis by velocity criteria. Irregular arterial pulses are noted.  NBNB emesis today, N/V improved after Zofran, IV Protonix and Clear Liquid diet also placed  2 bouts of diarrhea today, diarrhea persists until tomorrow, will order C. difficile and stool studies  F/u TTE  F/u PT  F/u US renal  F/u Laxatives  F/u Bcx, Ucx

## 2019-05-10 NOTE — PROGRESS NOTE ADULT - SUBJECTIVE AND OBJECTIVE BOX
Patient is a 84y old  Female who presents with a chief complaint of dizziness and upper abdominal pain (09 May 2019 22:33)    History of Present Illness: 	  84/F form home lives with daughter independent at baseline with PMH of dementia, HTN, HLD presented with dizziness and upper abdominal pain. Patient reports that today she started having dizziness which she describes as room spinning sensation associated with nausea, worsening with head movement, improving with lying down and closed eyes, she had similar episode in past for which she was told that its form ear problems and which improved with medications.  Denies episode of vomiting, fall, ear pain or discharge. She also mentions epigastric colicky pain which is 8/10 in intensity, no aggravating or reliving factors. Denies vomiting, diarrhea, SOB, SCHWARTZ, palpitations, headache, cough, wheezing, joint pain or swelling, fever, chills.         pt seen in icu [  ], reg med floor [   ], bed [  ], chair at bedside [   ], a+o x3 [  ], lethargic [  ],  nad [  ]    robison [  ], ngt [  ], peg [  ], et tube [  ], cent line [  ], picc line [  ]        Allergies    No Known Allergies        Vitals    T(F): 98.2 (05-10-19 @ 08:02), Max: 98.2 (05-10-19 @ 08:02)  HR: 71 (05-10-19 @ 08:02) (60 - 75)  BP: 168/60 (05-10-19 @ 08:02) (151/71 - 178/74)  RR: 18 (05-10-19 @ 08:02) (17 - 18)  SpO2: 100% (05-10-19 @ 08:02) (98% - 100%)  Wt(kg): --  CAPILLARY BLOOD GLUCOSE          Labs                          12.9   10.20 )-----------( 289      ( 10 May 2019 06:04 )             39.6       05-10    145  |  111<H>  |  17  ----------------------------<  110<H>  3.4<L>   |  25  |  0.85    Ca    8.7      10 May 2019 06:04  Phos  3.6     05-10  Mg     2.1     05-10    TPro  8.1  /  Alb  4.0  /  TBili  0.3  /  DBili  x   /  AST  18  /  ALT  23  /  AlkPhos  83  05-09      CARDIAC MARKERS ( 10 May 2019 06:04 )  0.066 ng/mL / x     / 26 U/L / x     / <1.0 ng/mL  CARDIAC MARKERS ( 09 May 2019 22:56 )  0.067 ng/mL / x     / 30 U/L / x     / <1.0 ng/mL  CARDIAC MARKERS ( 09 May 2019 16:56 )  0.054 ng/mL / x     / 34 U/L / x     / x                Radiology Results      Meds    MEDICATIONS  (STANDING):  aspirin enteric coated 81 milliGRAM(s) Oral daily  atorvastatin 80 milliGRAM(s) Oral at bedtime  donepezil 10 milliGRAM(s) Oral at bedtime  enoxaparin Injectable 40 milliGRAM(s) SubCutaneous daily  meclizine 25 milliGRAM(s) Oral every 8 hours  memantine 10 milliGRAM(s) Oral daily      MEDICATIONS  (PRN):      Physical Exam    Neuro :  no focal deficits  Respiratory: CTA B/L  CV: RRR, S1S2, no murmurs,   Abdominal: Soft, NT, ND +BS,  Extremities: No edema, + peripheral pulses    ASSESSMENT    Dizziness and giddiness  HTN (hypertension)  History of cholecystectomy      PLAN    Problem/Plan - 1:  ·  Problem: Dizziness.  Plan: presented with room spinning sensation, worsening with head movement and improvement with rest and closing eyes  nystagmus negative and orthostatic negative on lying down and sitting  central vs veritgo    CT head: Mild chronic microvascular changes without evidence of an acute transcortical infarction or hemorrhage.  T1 0.054, T2 of 0.067 trend serial cardiac enzymes   EKG- NSR no stt wave changes  follow ECHO, Carotid doppler   neuro consult in AM  will hold HTN medications for permissive HTN  starting on aspirin, atorvastatin and meclizine standing   tele monitoring and neruo checks   PT consult.     Problem/Plan - 2:  ·  Problem: Abdominal pain.  Plan: epigastric abdominal pain - colicky with bloating   CT abdomen suggestive of kidney lesion - will get renal USG   will start on maalox.     Problem/Plan - 3:  ·  Problem: Elevated troponin.  Plan: T1 0.054, T2 of 0.067 trend serial cardiac enzymes   EKG- NSR no stt wave changes  tele monitoring.     Problem/Plan - 4:  ·  Problem: Abnormal CT scan.  Plan: CT A/P: 3.8 cm indeterminate hypodense lesion in the left kidney with a Hounsfield unit of 28.  will get follow up USG renal.     Problem/Plan - 5:  ·  Problem: HTN (hypertension).  Plan: on losartan - HCTZ, atenolol and amlodipine   will hold 2/2 permissive HTN. Patient is a 84y old  Female who presents with a chief complaint of dizziness and upper abdominal pain (09 May 2019 22:33)    History of Present Illness: 	  84/F form home lives with daughter independent at baseline with PMH of dementia, HTN, HLD presented with dizziness and upper abdominal pain. Patient reports that today she started having dizziness which she describes as room spinning sensation associated with nausea, worsening with head movement, improving with lying down and closed eyes, she had similar episode in past for which she was told that its form ear problems and which improved with medications.  Denies episode of vomiting, fall, ear pain or discharge. She also mentions epigastric colicky pain which is 8/10 in intensity, no aggravating or reliving factors. Denies vomiting, diarrhea, SOB, SCHWARTZ, palpitations, headache, cough, wheezing, joint pain or swelling, fever, chills.         pt seen in tele [ x ], reg med floor [   ], bed [ x ], chair at bedside [   ], a+o x3 [  ], lethargic [  ],  nad [ x ]           Allergies    No Known Allergies        Vitals    T(F): 98.2 (05-10-19 @ 08:02), Max: 98.2 (05-10-19 @ 08:02)  HR: 71 (05-10-19 @ 08:02) (60 - 75)  BP: 168/60 (05-10-19 @ 08:02) (151/71 - 178/74)  RR: 18 (05-10-19 @ 08:02) (17 - 18)  SpO2: 100% (05-10-19 @ 08:02) (98% - 100%)  Wt(kg): --  CAPILLARY BLOOD GLUCOSE          Labs                          12.9   10.20 )-----------( 289      ( 10 May 2019 06:04 )             39.6       05-10    145  |  111<H>  |  17  ----------------------------<  110<H>  3.4<L>   |  25  |  0.85    Ca    8.7      10 May 2019 06:04  Phos  3.6     05-10  Mg     2.1     05-10    TPro  8.1  /  Alb  4.0  /  TBili  0.3  /  DBili  x   /  AST  18  /  ALT  23  /  AlkPhos  83  05-09      CARDIAC MARKERS ( 10 May 2019 06:04 )  0.066 ng/mL / x     / 26 U/L / x     / <1.0 ng/mL  CARDIAC MARKERS ( 09 May 2019 22:56 )  0.067 ng/mL / x     / 30 U/L / x     / <1.0 ng/mL  CARDIAC MARKERS ( 09 May 2019 16:56 )  0.054 ng/mL / x     / 34 U/L / x     / x                Radiology Results      < from: CT Head No Cont (05.09.19 @ 20:27) >  IMPRESSION:  Mild chronic microvascular changes without evidence of an   acute transcortical infarction or hemorrhage. MR is a more sensitive   imaging modality for the evaluation of an acute infarction.     < end of copied text >        < from: CT Abdomen and Pelvis w/ IV Cont (05.09.19 @ 20:31) >  Impression: Hepatic steatosis.    3.8 cm indeterminate hypodense lesion in the left kidney with a   Hounsfield unit of 28. Renal ultrasound is recommended for further   evaluation.    The appendix appears normal. Colonic diverticulosis without evidence for   diverticulitis. Small hiatal hernia. No bowel obstruction, or grossly   thickened bowel wall.    The endometrium appears prominent. Pelvic ultrasound is recommended for   further evaluation.    < end of copied text >        Meds    MEDICATIONS  (STANDING):  aspirin enteric coated 81 milliGRAM(s) Oral daily  atorvastatin 80 milliGRAM(s) Oral at bedtime  donepezil 10 milliGRAM(s) Oral at bedtime  enoxaparin Injectable 40 milliGRAM(s) SubCutaneous daily  meclizine 25 milliGRAM(s) Oral every 8 hours  memantine 10 milliGRAM(s) Oral daily      MEDICATIONS  (PRN):      Physical Exam    Neuro :  no focal deficits except b/l nystagmus   Respiratory: CTA B/L  CV: RRR, S1S2, no murmurs,   Abdominal: Soft, NT, ND +BS,  Extremities: No edema, + peripheral pulses      ASSESSMENT    uti  Dizziness and giddiness  abd pain   vertigo   renal lesion   h/o HTN  cholecystectomy      PLAN    cont statin, asa, meclizine   cont tele   ua +ve   ct head noted   ct abd noted   f/u blood and urine cx   f/u echo  f/u carotid doppler  f/u renal us   cardio cons  neuro cons   PT cons   continue current meds

## 2019-05-10 NOTE — PATIENT PROFILE ADULT - STATED REASON FOR ADMISSION
patient family stated, "around 2pm she was normal then she start putting hand on her abdominal region saying it hurts, she has a headache and she cant see."

## 2019-05-10 NOTE — PROGRESS NOTE ADULT - SUBJECTIVE AND OBJECTIVE BOX
PGY 1 Note discussed with supervising resident and primary attending    Patient is a 84y old  Female who presents with a chief complaint of dizziness and upper abdominal pain (10 May 2019 11:47)      INTERVAL HPI/OVERNIGHT EVENTS: No acute events overnight, remains afebrile; HD stable, H/H stable, WBC WNL  Pt reports no new medical problems     MEDICATIONS  (STANDING):  aspirin enteric coated 81 milliGRAM(s) Oral daily  atorvastatin 80 milliGRAM(s) Oral at bedtime  cefTRIAXone   IVPB      donepezil 10 milliGRAM(s) Oral at bedtime  enoxaparin Injectable 40 milliGRAM(s) SubCutaneous daily  meclizine 25 milliGRAM(s) Oral every 8 hours  memantine 10 milliGRAM(s) Oral daily  ondansetron Injectable 4 milliGRAM(s) IV Push once  pantoprazole  Injectable 40 milliGRAM(s) IV Push daily    MEDICATIONS  (PRN):  ondansetron Injectable 4 milliGRAM(s) IV Push every 8 hours PRN Nausea and/or Vomiting      __________________________________________________  REVIEW OF SYSTEMS:    CONSTITUTIONAL: No fever,   EYES: No acute visual disturbances  NECK: No pain or stiffness  RESPIRATORY: No cough; No shortness of breath  CARDIOVASCULAR: No chest pain, no palpitations  GASTROINTESTINAL: Nausea, constant epigastric pain  NEUROLOGICAL: No headache or numbness, no tremors  MUSCULOSKELETAL: No joint pain, no muscle pain  GENITOURINARY: No dysuria, no frequency, no hesitancy      Vital Signs Last 24 Hrs  T(C): 36.6 (10 May 2019 11:35), Max: 36.8 (10 May 2019 08:02)  T(F): 97.9 (10 May 2019 11:35), Max: 98.2 (10 May 2019 08:02)  HR: 73 (10 May 2019 11:35) (60 - 75)  BP: 178/73 (10 May 2019 11:35) (151/71 - 178/74)  BP(mean): --  RR: 17 (10 May 2019 11:35) (17 - 18)  SpO2: 100% (10 May 2019 11:35) (98% - 100%)    ________________________________________________  PHYSICAL EXAM:    GENERAL: In mild distress 2/2 abdominal pain without tenderness  HEENT: Normocephalic;  conjunctivae and sclerae clear; moist mucous membranes;   NECK : supple  CHEST/LUNG: Clear to auscultation bilaterally with good air entry   HEART: S1 S2  regular; no murmurs, gallops or rubs  ABDOMEN: Soft, Nontender, Nondistended; Bowel sounds present  EXTREMITIES: No cyanosis; no edema; no calf tenderness  SKIN: Warm and dry; no rash  NERVOUS SYSTEM:  Awake and alert; no new deficits    _________________________________________________  LABS:                        12.9   10.20 )-----------( 289      ( 10 May 2019 06:04 )             39.6     05-10    145  |  111<H>  |  17  ----------------------------<  110<H>  3.4<L>   |  25  |  0.85    Ca    8.7      10 May 2019 06:04  Phos  3.6     05-10  Mg     2.1     05-10    TPro  8.1  /  Alb  4.0  /  TBili  0.3  /  DBili  x   /  AST  18  /  ALT  23  /  AlkPhos  83  05-09      Urinalysis Basic - ( 09 May 2019 19:02 )    Color: Yellow / Appearance: Clear / S.010 / pH: x  Gluc: x / Ketone: Negative  / Bili: Negative / Urobili: Negative   Blood: x / Protein: Negative / Nitrite: Negative   Leuk Esterase: Trace / RBC: 0-2 /HPF / WBC 6-10 /HPF   Sq Epi: x / Non Sq Epi: Few /HPF / Bacteria: Few /HPF      CAPILLARY BLOOD GLUCOSE            RADIOLOGY & ADDITIONAL TESTS:    Imaging Personally Reviewed:  YES    Consultant(s) Notes Reviewed:   YES    Care Discussed with Consultants :     Plan of care was discussed with patient and /or primary care giver; all questions and concerns were addressed and care was aligned with patient's wishes. PGY 1 Note discussed with supervising resident and primary attending    Patient is a 84y old  Female who presents with a chief complaint of dizziness and upper abdominal pain (10 May 2019 11:47)      INTERVAL HPI/OVERNIGHT EVENTS: No acute events overnight, remains afebrile; HD stable, H/H stable, WBC WNL  Pt reports no new medical problems  -CD showed no hemodynamically significant internal carotid artery stenosis by velocity criteria. Irregular arterial pulses are noted.  -NBNB emesis today, N/V improved after Zofran, IV Protonix and Clear Liquid diet also placed  -2 bouts of diarrhea today, diarrhea persists until tomorrow, will order C. difficile and stool studies  -F/u TTE  -F/u PT  -F/u US renal  -F/u Laxatives  -F/u Bcx, Ucx    MEDICATIONS  (STANDING):  aspirin enteric coated 81 milliGRAM(s) Oral daily  atorvastatin 80 milliGRAM(s) Oral at bedtime  cefTRIAXone   IVPB      donepezil 10 milliGRAM(s) Oral at bedtime  enoxaparin Injectable 40 milliGRAM(s) SubCutaneous daily  meclizine 25 milliGRAM(s) Oral every 8 hours  memantine 10 milliGRAM(s) Oral daily  ondansetron Injectable 4 milliGRAM(s) IV Push once  pantoprazole  Injectable 40 milliGRAM(s) IV Push daily    MEDICATIONS  (PRN):  ondansetron Injectable 4 milliGRAM(s) IV Push every 8 hours PRN Nausea and/or Vomiting      __________________________________________________  REVIEW OF SYSTEMS:    CONSTITUTIONAL: No fever  EYES: No acute visual disturbances  NECK: No pain or stiffness  RESPIRATORY: No cough; No shortness of breath  CARDIOVASCULAR: No chest pain, no palpitations  GASTROINTESTINAL: Nausea, constant epigastric pain  NEUROLOGICAL: No headache or numbness, no tremors  MUSCULOSKELETAL: No joint pain, no muscle pain  GENITOURINARY: No dysuria, no frequency, no hesitancy      Vital Signs Last 24 Hrs  T(C): 36.6 (10 May 2019 11:35), Max: 36.8 (10 May 2019 08:02)  T(F): 97.9 (10 May 2019 11:35), Max: 98.2 (10 May 2019 08:02)  HR: 73 (10 May 2019 11:35) (60 - 75)  BP: 178/73 (10 May 2019 11:35) (151/71 - 178/74)  BP(mean): --  RR: 17 (10 May 2019 11:35) (17 - 18)  SpO2: 100% (10 May 2019 11:35) (98% - 100%)    ________________________________________________  PHYSICAL EXAM:    GENERAL: In mild distress 2/2 abdominal pain without tenderness  HEENT: Normocephalic;  conjunctivae and sclerae clear; moist mucous membranes;   NECK : supple  CHEST/LUNG: Clear to auscultation bilaterally with good air entry   HEART: S1 S2  regular; no murmurs, gallops or rubs  ABDOMEN: Soft, Nontender, Nondistended; Bowel sounds present  EXTREMITIES: No cyanosis; no edema; no calf tenderness  SKIN: Warm and dry; no rash  NERVOUS SYSTEM:  Awake and alert; no new deficits    _________________________________________________  LABS:                        12.9   10.20 )-----------( 289      ( 10 May 2019 06:04 )             39.6     05-10    145  |  111<H>  |  17  ----------------------------<  110<H>  3.4<L>   |  25  |  0.85    Ca    8.7      10 May 2019 06:04  Phos  3.6     05-10  Mg     2.1     05-10    TPro  8.1  /  Alb  4.0  /  TBili  0.3  /  DBili  x   /  AST  18  /  ALT  23  /  AlkPhos  83  05-09      Urinalysis Basic - ( 09 May 2019 19:02 )    Color: Yellow / Appearance: Clear / S.010 / pH: x  Gluc: x / Ketone: Negative  / Bili: Negative / Urobili: Negative   Blood: x / Protein: Negative / Nitrite: Negative   Leuk Esterase: Trace / RBC: 0-2 /HPF / WBC 6-10 /HPF   Sq Epi: x / Non Sq Epi: Few /HPF / Bacteria: Few /HPF      CAPILLARY BLOOD GLUCOSE            RADIOLOGY & ADDITIONAL TESTS:    Imaging Personally Reviewed:  YES    Consultant(s) Notes Reviewed:   YES    Care Discussed with Consultants :     Plan of care was discussed with patient and /or primary care giver; all questions and concerns were addressed and care was aligned with patient's wishes.

## 2019-05-10 NOTE — CONSULT NOTE ADULT - SUBJECTIVE AND OBJECTIVE BOX
CHIEF COMPLAINT:Patient is a 84y old  Female who presents with a chief complaint of dizziness and upper abdominal pain.      HPI:  84 yr old Female form home lives with daughter independent at baseline with PMHX of dementia, HTN, HLD presented with dizziness and upper abdominal pain. Patient reports that today she started having dizziness which she describes as room spinning sensation associated with nausea, worsening with head movement, improving with lying down and closed eyes, she had similar episode in past for which she was told that its form ear problems and which improved with medications.  Denies episode of vomiting, fall, ear pain or discharge. She also mentions epigastric colicky pain which is 8/10 in intensity, no aggravating or reliving factors. Denies vomiting, diarrhea, SOB, SCHWARTZ, palpitations, headache, cough, wheezing, joint pain or swelling, fever, chills. (09 May 2019 22:33)      PAST MEDICAL & SURGICAL HISTORY:  HTN (hypertension)  History of cholecystectomy      MEDICATIONS  (STANDING):  aspirin enteric coated 81 milliGRAM(s) Oral daily  atorvastatin 80 milliGRAM(s) Oral at bedtime  donepezil 10 milliGRAM(s) Oral at bedtime  enoxaparin Injectable 40 milliGRAM(s) SubCutaneous daily  meclizine 25 milliGRAM(s) Oral every 8 hours  memantine 10 milliGRAM(s) Oral daily    MEDICATIONS  (PRN):      FAMILY HISTORY: No hx of CAD      SOCIAL HISTORY:    [x ] Non-smoker    [x ] Alcohol-denies    Allergies    No Known Allergies    Intolerances    	    REVIEW OF SYSTEMS:  CONSTITUTIONAL: No fever, weight loss, or fatigue  EYES: No eye pain, visual disturbances, or discharge  ENT:  No difficulty hearing, tinnitus, vertigo; No sinus or throat pain  NECK: No pain or stiffness  RESPIRATORY: No cough, wheezing, chills or hemoptysis; No Shortness of Breath  CARDIOVASCULAR: No chest pain, palpitations, passing out, +dizziness  GASTROINTESTINAL: + abdominal or epigastric pain. No nausea, vomiting, or hematemesis; No diarrhea or constipation. No melena or hematochezia.  GENITOURINARY: No dysuria, frequency, hematuria, or incontinence  NEUROLOGICAL: No headaches, memory loss, loss of strength, numbness, or tremors  SKIN: No itching, burning, rashes, or lesions   LYMPH Nodes: No enlarged glands  ENDOCRINE: No heat or cold intolerance; No hair loss  MUSCULOSKELETAL: No joint pain or swelling; No muscle, back, or extremity pain  PSYCHIATRIC: No depression, anxiety, mood swings, or difficulty sleeping  HEME/LYMPH: No easy bruising, or bleeding gums  ALLERGY AND IMMUNOLOGIC: No hives or eczema	      PHYSICAL EXAM:  T(C): 36.8 (05-10-19 @ 08:02), Max: 36.8 (05-10-19 @ 08:02)  HR: 71 (05-10-19 @ 08:02) (60 - 75)  BP: 168/60 (05-10-19 @ 08:02) (151/71 - 178/74)  RR: 18 (05-10-19 @ 08:02) (17 - 18)  SpO2: 100% (05-10-19 @ 08:02) (98% - 100%)  Wt(kg): --  I&O's Summary      Appearance: Normal	  HEENT:   Normal oral mucosa, PERRL, EOMI	  Lymphatic: No lymphadenopathy  Cardiovascular: Normal S1 S2, No JVD, No murmurs, No edema  Respiratory: Lungs clear to auscultation	  Psychiatry: A & O x 3, Mood & affect appropriate  Gastrointestinal:  Soft, Non-tender, + BS	  Skin: No rashes, No ecchymoses, No cyanosis	  Neurologic: Non-focal  Extremities: Normal range of motion, No clubbing, cyanosis or edema  Vascular: Peripheral pulses palpable 2+ bilaterally    TELEMETRY: sr pvc's,couplets	      ECG:  sinus bradycardia at  54 bpm,,lpfb,lvh	    	  LABS:	 	    CARDIAC MARKERS:  CARDIAC MARKERS ( 10 May 2019 06:04 )  0.066 ng/mL / x     / 26 U/L / x     / <1.0 ng/mL  CARDIAC MARKERS ( 09 May 2019 22:56 )  0.067 ng/mL / x     / 30 U/L / x     / <1.0 ng/mL  CARDIAC MARKERS ( 09 May 2019 16:56 )  0.054 ng/mL / x     / 34 U/L / x     / x                             12.9   10.20 )-----------( 289      ( 10 May 2019 06:04 )             39.6     05-10    145  |  111<H>  |  17  ----------------------------<  110<H>  3.4<L>   |  25  |  0.85    Ca    8.7      10 May 2019 06:04  Phos  3.6     05-10  Mg     2.1     05-10    TPro  8.1  /  Alb  4.0  /  TBili  0.3  /  DBili  x   /  AST  18  /  ALT  23  /  AlkPhos  83  05-09      Lipid Profile: Cholesterol 198    HDL 71        TSH: Thyroid Stimulating Hormone, Serum: 1.07 uU/mL (05-10 @ 06:04)        EXAM:  CT BRAIN                            PROCEDURE DATE:  05/09/2019          INTERPRETATION:  CLINICAL Indications:  dizziness    COMPARISON: None.    TECHNIQUE: Noncontrast CT of the head. Multiplanar reformations are   submitted.    FINDINGS: Tiny 4 mm noncalcified right parafalcine meningioma on image   21, series 2.  There is periventricular and subcortical white matter hypodensity without   mass effect, nonspecific, likely representing mild chronic microvascular   ischemic changes. There is no compelling evidence for an acute   transcortical infarction. There is no evidence of mass, mass effect,   midline shift or extra-axial fluid collection. The lateral ventricles and   cortical sulci are age-appropriate in size and configuration.The patient   is status post bilateral ocular lens replacement surgery.  The orbits,   mastoid air cells and visualized paranasal sinuses are otherwise   unremarkable. The calvarium is intact.    IMPRESSION:  Mild chronic microvascular changes without evidence of an   acute transcortical infarction or hemorrhage. MR is a more sensitive   imaging modality for the evaluation of an acute infarction.       EXAM:  CT ABDOMEN AND PELVIS IC                            PROCEDURE DATE:  05/09/2019          INTERPRETATION:  CT of the abdomen and pelvis with IV contrast    Clinical Indication: upper abdominal pain    Technique: Axial multidetector CT images of the abdomen and pelvis are   acquired following the administration of IV contrast (90 cc Omnipaque-350   administered, 10 cc discarded).    Comparison: None.    Findings: Limited sections through the lung bases demonstrate small   bilateral atelectasis.    Cholecystectomy clips are present. Hepatic steatosis. The pancreas,   spleen, and the right adrenal appear unremarkable. There is a 3.8 cm   indeterminate hypodense lesion in the left kidney with a Hounsfield unit   of 28. Renal ultrasound isrecommended for further evaluation. Tiny   hypodense lesion in the right kidney, too small to characterize.   Nonspecific mild left adrenal thickening.    The appendix appears normal. Colonic diverticulosis without evidence for   diverticulitis. Small hiatal hernia. No bowel obstruction, or grossly   thickened bowel wall.    No evidence for free air, ascites, or enlarged lymph node.    The urinary bladder appears unremarkable. Small calcified uterine   fibroid. The endometrium appears prominent.    Impression: Hepatic steatosis.    3.8 cm indeterminate hypodense lesion in the left kidney with a   Hounsfield unit of 28. Renal ultrasound is recommended for further   evaluation.    The appendix appears normal. Colonic diverticulosis without evidence for   diverticulitis. Small hiatal hernia. No bowel obstruction, or grossly   thickened bowel wall.    The endometrium appears prominent. Pelvic ultrasound is recommended for   further evaluation.

## 2019-05-11 LAB
ANION GAP SERPL CALC-SCNC: 6 MMOL/L — SIGNIFICANT CHANGE UP (ref 5–17)
BUN SERPL-MCNC: 17 MG/DL — SIGNIFICANT CHANGE UP (ref 7–18)
CALCIUM SERPL-MCNC: 9.1 MG/DL — SIGNIFICANT CHANGE UP (ref 8.4–10.5)
CHLORIDE SERPL-SCNC: 110 MMOL/L — HIGH (ref 96–108)
CO2 SERPL-SCNC: 27 MMOL/L — SIGNIFICANT CHANGE UP (ref 22–31)
CREAT SERPL-MCNC: 0.89 MG/DL — SIGNIFICANT CHANGE UP (ref 0.5–1.3)
GLUCOSE SERPL-MCNC: 112 MG/DL — HIGH (ref 70–99)
HCT VFR BLD CALC: 39.6 % — SIGNIFICANT CHANGE UP (ref 34.5–45)
HGB BLD-MCNC: 13 G/DL — SIGNIFICANT CHANGE UP (ref 11.5–15.5)
LACTATE SERPL-SCNC: 1.6 MMOL/L — SIGNIFICANT CHANGE UP (ref 0.7–2)
MAGNESIUM SERPL-MCNC: 2.3 MG/DL — SIGNIFICANT CHANGE UP (ref 1.6–2.6)
MCHC RBC-ENTMCNC: 30.4 PG — SIGNIFICANT CHANGE UP (ref 27–34)
MCHC RBC-ENTMCNC: 32.8 GM/DL — SIGNIFICANT CHANGE UP (ref 32–36)
MCV RBC AUTO: 92.5 FL — SIGNIFICANT CHANGE UP (ref 80–100)
NRBC # BLD: 0 /100 WBCS — SIGNIFICANT CHANGE UP (ref 0–0)
PHOSPHATE SERPL-MCNC: 3.5 MG/DL — SIGNIFICANT CHANGE UP (ref 2.5–4.5)
PLATELET # BLD AUTO: 297 K/UL — SIGNIFICANT CHANGE UP (ref 150–400)
POTASSIUM SERPL-MCNC: 4.2 MMOL/L — SIGNIFICANT CHANGE UP (ref 3.5–5.3)
POTASSIUM SERPL-SCNC: 4.2 MMOL/L — SIGNIFICANT CHANGE UP (ref 3.5–5.3)
RBC # BLD: 4.28 M/UL — SIGNIFICANT CHANGE UP (ref 3.8–5.2)
RBC # FLD: 13 % — SIGNIFICANT CHANGE UP (ref 10.3–14.5)
SODIUM SERPL-SCNC: 143 MMOL/L — SIGNIFICANT CHANGE UP (ref 135–145)
WBC # BLD: 8.5 K/UL — SIGNIFICANT CHANGE UP (ref 3.8–10.5)
WBC # FLD AUTO: 8.5 K/UL — SIGNIFICANT CHANGE UP (ref 3.8–10.5)

## 2019-05-11 PROCEDURE — 99232 SBSQ HOSP IP/OBS MODERATE 35: CPT

## 2019-05-11 PROCEDURE — 76775 US EXAM ABDO BACK WALL LIM: CPT | Mod: 26

## 2019-05-11 RX ORDER — SODIUM CHLORIDE 0.65 %
1 AEROSOL, SPRAY (ML) NASAL THREE TIMES A DAY
Refills: 0 | Status: DISCONTINUED | OUTPATIENT
Start: 2019-05-11 | End: 2019-05-19

## 2019-05-11 RX ORDER — LOSARTAN POTASSIUM 100 MG/1
25 TABLET, FILM COATED ORAL DAILY
Refills: 0 | Status: DISCONTINUED | OUTPATIENT
Start: 2019-05-11 | End: 2019-05-12

## 2019-05-11 RX ORDER — SODIUM CHLORIDE 0.65 %
1 AEROSOL, SPRAY (ML) NASAL THREE TIMES A DAY
Refills: 0 | Status: DISCONTINUED | OUTPATIENT
Start: 2019-05-11 | End: 2019-05-11

## 2019-05-11 RX ORDER — ERGOCALCIFEROL 1.25 MG/1
50000 CAPSULE ORAL
Refills: 0 | Status: DISCONTINUED | OUTPATIENT
Start: 2019-05-11 | End: 2019-05-19

## 2019-05-11 RX ADMIN — Medication 25 MILLIGRAM(S): at 14:35

## 2019-05-11 RX ADMIN — ENOXAPARIN SODIUM 40 MILLIGRAM(S): 100 INJECTION SUBCUTANEOUS at 11:09

## 2019-05-11 RX ADMIN — PANTOPRAZOLE SODIUM 40 MILLIGRAM(S): 20 TABLET, DELAYED RELEASE ORAL at 12:38

## 2019-05-11 RX ADMIN — Medication 25 MILLIGRAM(S): at 22:45

## 2019-05-11 RX ADMIN — ATORVASTATIN CALCIUM 80 MILLIGRAM(S): 80 TABLET, FILM COATED ORAL at 22:45

## 2019-05-11 RX ADMIN — Medication 81 MILLIGRAM(S): at 11:09

## 2019-05-11 RX ADMIN — Medication 30 MILLILITER(S): at 06:47

## 2019-05-11 RX ADMIN — MEMANTINE HYDROCHLORIDE 10 MILLIGRAM(S): 10 TABLET ORAL at 11:10

## 2019-05-11 RX ADMIN — Medication 1000 UNIT(S): at 14:35

## 2019-05-11 RX ADMIN — CEFTRIAXONE 100 GRAM(S): 500 INJECTION, POWDER, FOR SOLUTION INTRAMUSCULAR; INTRAVENOUS at 12:38

## 2019-05-11 RX ADMIN — ERGOCALCIFEROL 50000 UNIT(S): 1.25 CAPSULE ORAL at 11:09

## 2019-05-11 RX ADMIN — Medication 25 MILLIGRAM(S): at 06:19

## 2019-05-11 RX ADMIN — DONEPEZIL HYDROCHLORIDE 10 MILLIGRAM(S): 10 TABLET, FILM COATED ORAL at 22:45

## 2019-05-11 RX ADMIN — LOSARTAN POTASSIUM 25 MILLIGRAM(S): 100 TABLET, FILM COATED ORAL at 11:08

## 2019-05-11 NOTE — PROGRESS NOTE ADULT - SUBJECTIVE AND OBJECTIVE BOX
PN to be completed    PGY 1 Note discussed with supervising resident and primary attending    Patient is a 84y old  Female who presents with a chief complaint of dizziness and upper abdominal pain (10 May 2019 13:57)      INTERVAL HPI/OVERNIGHT EVENTS: No acute events overnight, remains afebrile; HD stable, H/H stable, WBC WNL  Pt reports no new medical problems    MEDICATIONS  (STANDING):  aspirin enteric coated 81 milliGRAM(s) Oral daily  atorvastatin 80 milliGRAM(s) Oral at bedtime  cefTRIAXone   IVPB 1 Gram(s) IV Intermittent every 24 hours  cefTRIAXone   IVPB      cholecalciferol 1000 Unit(s) Oral daily  donepezil 10 milliGRAM(s) Oral at bedtime  enoxaparin Injectable 40 milliGRAM(s) SubCutaneous daily  meclizine 25 milliGRAM(s) Oral every 8 hours  memantine 10 milliGRAM(s) Oral daily  pantoprazole  Injectable 40 milliGRAM(s) IV Push daily    MEDICATIONS  (PRN):  aluminum hydroxide/magnesium hydroxide/simethicone Suspension 30 milliLiter(s) Oral every 6 hours PRN Dyspepsia  ondansetron Injectable 4 milliGRAM(s) IV Push every 8 hours PRN Nausea and/or Vomiting  sodium chloride 0.65% Nasal 1 Spray(s) Both Nostrils three times a day PRN Nasal Congestion      __________________________________________________  REVIEW OF SYSTEMS:    CONSTITUTIONAL: No fever  EYES: No acute visual disturbances  NECK: No pain or stiffness  RESPIRATORY: No cough; No shortness of breath  CARDIOVASCULAR: No chest pain, no palpitations  GASTROINTESTINAL: Nausea, constant epigastric pain  NEUROLOGICAL: No headache or numbness, no tremors  MUSCULOSKELETAL: No joint pain, no muscle pain  GENITOURINARY: No dysuria, no frequency, no hesitancy      Vital Signs Last 24 Hrs  T(C): 36.4 (11 May 2019 04:52), Max: 36.8 (10 May 2019 23:31)  T(F): 97.5 (11 May 2019 04:52), Max: 98.3 (10 May 2019 23:31)  HR: 81 (11 May 2019 04:52) (69 - 89)  BP: 163/74 (11 May 2019 04:52) (145/74 - 178/73)  BP(mean): --  RR: 18 (11 May 2019 04:52) (17 - 18)  SpO2: 100% (11 May 2019 04:52) (96% - 100%)    ________________________________________________  PHYSICAL EXAM:    GENERAL: In mild distress 2/2 abdominal pain without tenderness  HEENT: Normocephalic;  conjunctivae and sclerae clear; moist mucous membranes;   NECK : supple  CHEST/LUNG: Clear to auscultation bilaterally with good air entry   HEART: S1 S2  regular; no murmurs, gallops or rubs  ABDOMEN: Soft, Nontender, Nondistended; Bowel sounds present  EXTREMITIES: No cyanosis; no edema; no calf tenderness  SKIN: Warm and dry; no rash  NERVOUS SYSTEM:  Awake and alert; no new deficits      _________________________________________________  LABS:                        13.0   8.50  )-----------( 297      ( 11 May 2019 07:25 )             39.6     05-11    143  |  110<H>  |  17  ----------------------------<  112<H>  4.2   |  27  |  0.89    Ca    9.1      11 May 2019 06:42  Phos  3.5     05-  Mg     2.3         TPro  8.1  /  Alb  4.0  /  TBili  0.3  /  DBili  x   /  AST  18  /  ALT  23  /  AlkPhos  83  05-09      Urinalysis Basic - ( 09 May 2019 19:02 )    Color: Yellow / Appearance: Clear / S.010 / pH: x  Gluc: x / Ketone: Negative  / Bili: Negative / Urobili: Negative   Blood: x / Protein: Negative / Nitrite: Negative   Leuk Esterase: Trace / RBC: 0-2 /HPF / WBC 6-10 /HPF   Sq Epi: x / Non Sq Epi: Few /HPF / Bacteria: Few /HPF      CAPILLARY BLOOD GLUCOSE            RADIOLOGY & ADDITIONAL TESTS:    Imaging Personally Reviewed:  YES    Consultant(s) Notes Reviewed:   YES    Care Discussed with Consultants :     Plan of care was discussed with patient and /or primary care giver; all questions and concerns were addressed and care was aligned with patient's wishes. PGY 1 Note discussed with supervising resident and primary attending    Patient is a 84y old  Female who presents with a chief complaint of dizziness and upper abdominal pain (10 May 2019 13:57)      INTERVAL HPI/OVERNIGHT EVENTS: No acute events overnight, remains afebrile; HD stable, H/H stable, WBC WNL  -Pt reports no new medical problems  -Lactate normalized to 1.6  -CD showed no hemodynamically significant internal carotid artery stenosis by velocity criteria  -UCx negative  -F/u BCx  -F/u MR head with no contrast per Neuro  -Orthostatic BP Q shift per Cardio    MEDICATIONS  (STANDING):  aspirin enteric coated 81 milliGRAM(s) Oral daily  atorvastatin 80 milliGRAM(s) Oral at bedtime  cefTRIAXone   IVPB 1 Gram(s) IV Intermittent every 24 hours  cefTRIAXone   IVPB      cholecalciferol 1000 Unit(s) Oral daily  donepezil 10 milliGRAM(s) Oral at bedtime  enoxaparin Injectable 40 milliGRAM(s) SubCutaneous daily  meclizine 25 milliGRAM(s) Oral every 8 hours  memantine 10 milliGRAM(s) Oral daily  pantoprazole  Injectable 40 milliGRAM(s) IV Push daily    MEDICATIONS  (PRN):  aluminum hydroxide/magnesium hydroxide/simethicone Suspension 30 milliLiter(s) Oral every 6 hours PRN Dyspepsia  ondansetron Injectable 4 milliGRAM(s) IV Push every 8 hours PRN Nausea and/or Vomiting  sodium chloride 0.65% Nasal 1 Spray(s) Both Nostrils three times a day PRN Nasal Congestion      __________________________________________________  REVIEW OF SYSTEMS:    CONSTITUTIONAL: No fever  EYES: No acute visual disturbances  NECK: No pain or stiffness  RESPIRATORY: No cough; No shortness of breath  CARDIOVASCULAR: No chest pain, no palpitations  GASTROINTESTINAL: Nausea, constant epigastric pain  NEUROLOGICAL: No headache or numbness, no tremors  MUSCULOSKELETAL: No joint pain, no muscle pain  GENITOURINARY: No dysuria, no frequency, no hesitancy      Vital Signs Last 24 Hrs  T(C): 36.4 (11 May 2019 04:52), Max: 36.8 (10 May 2019 23:31)  T(F): 97.5 (11 May 2019 04:52), Max: 98.3 (10 May 2019 23:31)  HR: 81 (11 May 2019 04:52) (69 - 89)  BP: 163/74 (11 May 2019 04:52) (145/74 - 178/73)  BP(mean): --  RR: 18 (11 May 2019 04:52) (17 - 18)  SpO2: 100% (11 May 2019 04:52) (96% - 100%)    ________________________________________________  PHYSICAL EXAM:    GENERAL: In mild distress 2/2 abdominal pain without tenderness  HEENT: Normocephalic;  conjunctivae and sclerae clear; moist mucous membranes;   NECK : supple  CHEST/LUNG: Clear to auscultation bilaterally with good air entry   HEART: S1 S2  regular; no murmurs, gallops or rubs  ABDOMEN: Soft, Nontender, Nondistended; Bowel sounds present  EXTREMITIES: No cyanosis; no edema; no calf tenderness  SKIN: Warm and dry; no rash  NERVOUS SYSTEM:  Awake and alert; no new deficits      _________________________________________________  LABS:                        13.0   8.50  )-----------( 297      ( 11 May 2019 07:25 )             39.6     05-11    143  |  110<H>  |  17  ----------------------------<  112<H>  4.2   |  27  |  0.89    Ca    9.1      11 May 2019 06:42  Phos  3.5     05-11  Mg     2.3     05-11    TPro  8.1  /  Alb  4.0  /  TBili  0.3  /  DBili  x   /  AST  18  /  ALT  23  /  AlkPhos  83  05-09      Urinalysis Basic - ( 09 May 2019 19:02 )    Color: Yellow / Appearance: Clear / S.010 / pH: x  Gluc: x / Ketone: Negative  / Bili: Negative / Urobili: Negative   Blood: x / Protein: Negative / Nitrite: Negative   Leuk Esterase: Trace / RBC: 0-2 /HPF / WBC 6-10 /HPF   Sq Epi: x / Non Sq Epi: Few /HPF / Bacteria: Few /HPF      CAPILLARY BLOOD GLUCOSE            RADIOLOGY & ADDITIONAL TESTS:    Imaging Personally Reviewed:  YES    Consultant(s) Notes Reviewed:   YES    Care Discussed with Consultants :     Plan of care was discussed with patient and /or primary care giver; all questions and concerns were addressed and care was aligned with patient's wishes. PGY 1 Note discussed with supervising resident and primary attending    Patient is a 84y old  Female who presents with a chief complaint of dizziness and upper abdominal pain (10 May 2019 13:57)      INTERVAL HPI/OVERNIGHT EVENTS: No acute events overnight, remains afebrile; HD stable, H/H stable, WBC WNL  -Pt reports no new medical problems  -Lactate normalized to 1.6  -CD showed no hemodynamically significant internal carotid artery stenosis by velocity criteria  -UCx negative  -F/u BCx  -F/u MR head with no contrast per Neuro, paperwork sent to MR  -Orthostatic BP Q shift per Cardio    MEDICATIONS  (STANDING):  aspirin enteric coated 81 milliGRAM(s) Oral daily  atorvastatin 80 milliGRAM(s) Oral at bedtime  cefTRIAXone   IVPB 1 Gram(s) IV Intermittent every 24 hours  cefTRIAXone   IVPB      cholecalciferol 1000 Unit(s) Oral daily  donepezil 10 milliGRAM(s) Oral at bedtime  enoxaparin Injectable 40 milliGRAM(s) SubCutaneous daily  meclizine 25 milliGRAM(s) Oral every 8 hours  memantine 10 milliGRAM(s) Oral daily  pantoprazole  Injectable 40 milliGRAM(s) IV Push daily    MEDICATIONS  (PRN):  aluminum hydroxide/magnesium hydroxide/simethicone Suspension 30 milliLiter(s) Oral every 6 hours PRN Dyspepsia  ondansetron Injectable 4 milliGRAM(s) IV Push every 8 hours PRN Nausea and/or Vomiting  sodium chloride 0.65% Nasal 1 Spray(s) Both Nostrils three times a day PRN Nasal Congestion      __________________________________________________  REVIEW OF SYSTEMS:    CONSTITUTIONAL: No fever  EYES: No acute visual disturbances  NECK: No pain or stiffness  RESPIRATORY: No cough; No shortness of breath  CARDIOVASCULAR: No chest pain, no palpitations  GASTROINTESTINAL: Nausea, constant epigastric pain  NEUROLOGICAL: No headache or numbness, no tremors  MUSCULOSKELETAL: No joint pain, no muscle pain  GENITOURINARY: No dysuria, no frequency, no hesitancy      Vital Signs Last 24 Hrs  T(C): 36.4 (11 May 2019 04:52), Max: 36.8 (10 May 2019 23:31)  T(F): 97.5 (11 May 2019 04:52), Max: 98.3 (10 May 2019 23:31)  HR: 81 (11 May 2019 04:52) (69 - 89)  BP: 163/74 (11 May 2019 04:52) (145/74 - 178/73)  BP(mean): --  RR: 18 (11 May 2019 04:52) (17 - 18)  SpO2: 100% (11 May 2019 04:52) (96% - 100%)    ________________________________________________  PHYSICAL EXAM:    GENERAL: In mild distress 2/2 abdominal pain without tenderness  HEENT: Normocephalic;  conjunctivae and sclerae clear; moist mucous membranes;   NECK : supple  CHEST/LUNG: Clear to auscultation bilaterally with good air entry   HEART: S1 S2  regular; no murmurs, gallops or rubs  ABDOMEN: Soft, Nontender, Nondistended; Bowel sounds present  EXTREMITIES: No cyanosis; no edema; no calf tenderness  SKIN: Warm and dry; no rash  NERVOUS SYSTEM:  Awake and alert; no new deficits      _________________________________________________  LABS:                        13.0   8.50  )-----------( 297      ( 11 May 2019 07:25 )             39.6     0511    143  |  110<H>  |  17  ----------------------------<  112<H>  4.2   |  27  |  0.89    Ca    9.1      11 May 2019 06:42  Phos  3.5       Mg     2.3         TPro  8.1  /  Alb  4.0  /  TBili  0.3  /  DBili  x   /  AST  18  /  ALT  23  /  AlkPhos  83  05-09      Urinalysis Basic - ( 09 May 2019 19:02 )    Color: Yellow / Appearance: Clear / S.010 / pH: x  Gluc: x / Ketone: Negative  / Bili: Negative / Urobili: Negative   Blood: x / Protein: Negative / Nitrite: Negative   Leuk Esterase: Trace / RBC: 0-2 /HPF / WBC 6-10 /HPF   Sq Epi: x / Non Sq Epi: Few /HPF / Bacteria: Few /HPF      CAPILLARY BLOOD GLUCOSE            RADIOLOGY & ADDITIONAL TESTS:    Imaging Personally Reviewed:  YES    Consultant(s) Notes Reviewed:   YES    Care Discussed with Consultants :     Plan of care was discussed with patient and /or primary care giver; all questions and concerns were addressed and care was aligned with patient's wishes.

## 2019-05-11 NOTE — CHART NOTE - NSCHARTNOTEFT_GEN_A_CORE
Spoke to both patient and patient's bilingual relative Ms. Alcala at the bed and updated on her status.  In case emergency contact cannot be reached, Ms. Alcala can be reached at [phone].

## 2019-05-11 NOTE — PROGRESS NOTE ADULT - SUBJECTIVE AND OBJECTIVE BOX
Patient is a 84y old  Female who presents with a chief complaint of dizziness and upper abdominal pain (11 May 2019 09:16)      pt seen in tele [ x ], reg med floor [   ], bed [ x ], chair at bedside [   ], a+o x3 [  ], lethargic [  ],  nad [ x ]        Allergies    No Known Allergies        Vitals    T(F): 98 (05-11-19 @ 07:36), Max: 98.3 (05-10-19 @ 23:31)  HR: 73 (05-11-19 @ 07:36) (69 - 89)  BP: 164/74 (05-11-19 @ 07:36) (145/74 - 164/74)  RR: 18 (05-11-19 @ 07:36) (18 - 18)  SpO2: 100% (05-11-19 @ 07:36) (96% - 100%)  Wt(kg): --  CAPILLARY BLOOD GLUCOSE          Labs                          13.0   8.50  )-----------( 297      ( 11 May 2019 07:25 )             39.6       05-11    143  |  110<H>  |  17  ----------------------------<  112<H>  4.2   |  27  |  0.89    Ca    9.1      11 May 2019 06:42  Phos  3.5     05-11  Mg     2.3     05-11    TPro  8.1  /  Alb  4.0  /  TBili  0.3  /  DBili  x   /  AST  18  /  ALT  23  /  AlkPhos  83  05-09      CARDIAC MARKERS ( 10 May 2019 06:04 )  0.066 ng/mL / x     / 26 U/L / x     / <1.0 ng/mL  CARDIAC MARKERS ( 09 May 2019 22:56 )  0.067 ng/mL / x     / 30 U/L / x     / <1.0 ng/mL  CARDIAC MARKERS ( 09 May 2019 16:56 )  0.054 ng/mL / x     / 34 U/L / x     / x            .Urine  05-10 @ 00:46   <10,000 CFU/mL Normal Urogenital Laura  --  --          Radiology Results    < from: US Duplex Carotid Arteries Complete, Bilateral (05.10.19 @ 12:22) >  Impression: No hemodynamically significant internal carotid artery   stenosis by velocity criteria.    Irregular arterial pulses are noted.      < end of copied text >            Meds    MEDICATIONS  (STANDING):  aspirin enteric coated 81 milliGRAM(s) Oral daily  atorvastatin 80 milliGRAM(s) Oral at bedtime  cefTRIAXone   IVPB 1 Gram(s) IV Intermittent every 24 hours  cefTRIAXone   IVPB      cholecalciferol 1000 Unit(s) Oral daily  donepezil 10 milliGRAM(s) Oral at bedtime  enoxaparin Injectable 40 milliGRAM(s) SubCutaneous daily  ergocalciferol 84618 Unit(s) Oral <User Schedule>  losartan 25 milliGRAM(s) Oral daily  meclizine 25 milliGRAM(s) Oral every 8 hours  memantine 10 milliGRAM(s) Oral daily  pantoprazole  Injectable 40 milliGRAM(s) IV Push daily      MEDICATIONS  (PRN):  aluminum hydroxide/magnesium hydroxide/simethicone Suspension 30 milliLiter(s) Oral every 6 hours PRN Dyspepsia  ondansetron Injectable 4 milliGRAM(s) IV Push every 8 hours PRN Nausea and/or Vomiting  sodium chloride 0.65% Nasal 1 Spray(s) Both Nostrils three times a day PRN Nasal Congestion      Physical Exam    Neuro :  no focal deficits except b/l nystagmus   Respiratory: CTA B/L  CV: RRR, S1S2, no murmurs,   Abdominal: Soft, NT, ND +BS,  Extremities: No edema, + peripheral pulses      ASSESSMENT    uti  Dizziness and giddiness  abd pain   vertigo   renal lesion   h/o HTN  cholecystectomy      PLAN    cont statin, asa, meclizine   cont tele   ua +ve   ceftriaxone   ct head noted   ct abd noted   f/u blood cx   urine cx wnl  f/u echo  carotid doppler noted -ve  f/u renal us   cardio cons  neuro cons   f/u MRI brain   PT cons   continue current meds

## 2019-05-11 NOTE — PROGRESS NOTE ADULT - ASSESSMENT
84 yr old Female form home lives with daughter independent at baseline with PMHX of dementia, HTN, HLD presented with dizziness and upper abdominal pain,bradycardia.   1.Tele monitoring.  2.Echocradiogram.  3.Neurology eval noted, await MRI.  4.Orthostatic BP q shift.  5.Bradycardia due to atenolol, hr stable .  6.Lipid d/o-statin.  7.Abdominal pain-GI eval.  8.HTN-add cozaar 25mg qd  9.Prediabestes-diet.  10.GI and DVT prophylaxis.

## 2019-05-11 NOTE — PROGRESS NOTE ADULT - SUBJECTIVE AND OBJECTIVE BOX
CHIEF COMPLAINT:Patient is a 84y old  Female who presents with a chief complaint of dizziness and upper abdominal pain.Pt appears comfortable.    	  REVIEW OF SYSTEMS:  CONSTITUTIONAL: No fever, weight loss, or fatigue  EYES: No eye pain, visual disturbances, or discharge  ENT:  No difficulty hearing, tinnitus, vertigo; No sinus or throat pain  NECK: No pain or stiffness  RESPIRATORY: No cough, wheezing, chills or hemoptysis; No Shortness of Breath  CARDIOVASCULAR: No chest pain, palpitations, passing out, dizziness, or leg swelling  GASTROINTESTINAL: No abdominal or epigastric pain. No nausea, vomiting, or hematemesis; No diarrhea or constipation. No melena or hematochezia.  GENITOURINARY: No dysuria, frequency, hematuria, or incontinence  NEUROLOGICAL: No headaches, memory loss, loss of strength, numbness, or tremors  SKIN: No itching, burning, rashes, or lesions   LYMPH Nodes: No enlarged glands  ENDOCRINE: No heat or cold intolerance; No hair loss  MUSCULOSKELETAL: No joint pain or swelling; No muscle, back, or extremity pain  PSYCHIATRIC: No depression, anxiety, mood swings, or difficulty sleeping  HEME/LYMPH: No easy bruising, or bleeding gums  ALLERGY AND IMMUNOLOGIC: No hives or eczema	      PHYSICAL EXAM:  T(C): 36.7 (05-11-19 @ 07:36), Max: 36.8 (05-10-19 @ 23:31)  HR: 73 (05-11-19 @ 07:36) (69 - 89)  BP: 164/74 (05-11-19 @ 07:36) (145/74 - 178/73)  RR: 18 (05-11-19 @ 07:36) (17 - 18)  SpO2: 100% (05-11-19 @ 07:36) (96% - 100%)    I&O's Summary    10 May 2019 07:01  -  11 May 2019 07:00  --------------------------------------------------------  IN: 1145 mL / OUT: 600 mL / NET: 545 mL        Appearance: Normal	  HEENT:   Normal oral mucosa, PERRL, EOMI	  Lymphatic: No lymphadenopathy  Cardiovascular: Normal S1 S2, No JVD, No murmurs, No edema  Respiratory: Lungs clear to auscultation	  Psychiatry: A & O x 3, Mood & affect appropriate  Gastrointestinal:  Soft, Non-tender, + BS	  Skin: No rashes, No ecchymoses, No cyanosis	  Neurologic: Non-focal  Extremities: Normal range of motion, No clubbing, cyanosis or edema  Vascular: Peripheral pulses palpable 2+ bilaterally    MEDICATIONS  (STANDING):  aspirin enteric coated 81 milliGRAM(s) Oral daily  atorvastatin 80 milliGRAM(s) Oral at bedtime  cefTRIAXone   IVPB 1 Gram(s) IV Intermittent every 24 hours  cefTRIAXone   IVPB      cholecalciferol 1000 Unit(s) Oral daily  donepezil 10 milliGRAM(s) Oral at bedtime  enoxaparin Injectable 40 milliGRAM(s) SubCutaneous daily  meclizine 25 milliGRAM(s) Oral every 8 hours  memantine 10 milliGRAM(s) Oral daily  pantoprazole  Injectable 40 milliGRAM(s) IV Push daily      TELEMETRY: 	nsr,pvc's      LABS:	 	    CARDIAC MARKERS:  CARDIAC MARKERS ( 10 May 2019 06:04 )  0.066 ng/mL / x     / 26 U/L / x     / <1.0 ng/mL  CARDIAC MARKERS ( 09 May 2019 22:56 )  0.067 ng/mL / x     / 30 U/L / x     / <1.0 ng/mL  CARDIAC MARKERS ( 09 May 2019 16:56 )  0.054 ng/mL / x     / 34 U/L / x     / x                              13.0   8.50  )-----------( 297      ( 11 May 2019 07:25 )             39.6     05-11    143  |  110<H>  |  17  ----------------------------<  112<H>  4.2   |  27  |  0.89    Ca    9.1      11 May 2019 06:42  Phos  3.5     05-11  Mg     2.3     05-11    TPro  8.1  /  Alb  4.0  /  TBili  0.3  /  DBili  x   /  AST  18  /  ALT  23  /  AlkPhos  83  05-09      Lipid Profile: Cholesterol 198    HDL 71      HgA1c: Hemoglobin A1C, Whole Blood: 5.8 % (05-10 @ 09:22)    TSH: Thyroid Stimulating Hormone, Serum: 1.07 uU/mL (05-10 @ 06:04)        EXAM:  US DPLX CAROTIDS COMPL BI                            PROCEDURE DATE:  05/10/2019          INTERPRETATION:  Carotid duplex Doppler ultrasound    Indication: dizziness    Comparison: None    Carotid duplex Doppler ultrasound is performed. Grayscale ultrasound   demonstrates mild atherosclerotic plaques in the proximal internal   carotid arteries bilaterally. The flow velocity measurements during peak   systolic phase in centimeters per second are as the following:    Right CCA 71, ICA 69, .  Left CCA 71, ICA 70, ECA 39.    The end diastolic velocity measurement for the right ICA is 21, and  for   the left ICA is 21.    The peak systolic ICA/CCA velocity ratio on the right is 1.0, and on the   left is 1.0.    Both vertebral arteries maintain normal antegrade flow direction.    Irregular arterial pulses are noted.    Impression: No hemodynamically significant internal carotid artery   stenosis by velocity criteria.    Irregular arterial pulses are noted.

## 2019-05-11 NOTE — PROGRESS NOTE ADULT - SUBJECTIVE AND OBJECTIVE BOX
Neurology Follow up note    Name  JUDE BILLY    HPI:  84/F form home lives with daughter independent at baseline with PMH of dementia, HTN, HLD presented with dizziness and upper abdominal pain. Patient reports that today she started having dizziness which she describes as room spinning sensation associated with nausea, worsening with head movement, improving with lying down and closed eyes, she had similar episode in past for which she was told that its form ear problems and which improved with medications.  Denies episode of vomiting, fall, ear pain or discharge. She also mentions epigastric colicky pain which is 8/10 in intensity, no aggravating or reliving factors. Denies vomiting, diarrhea, SOB, SCHWARTZ, palpitations, headache, cough, wheezing, joint pain or swelling, fever, chills. (09 May 2019 22:33)      Interval History -        Subjective:    Review of Systems:  Constitutional:        Eyes, Ears, Mouth, Throat:   Respiratory:                            Cardiovascular:   Gastrointestinal:                                     Genitourinary:   Musculoskeletal:                                    Dermatologic:   Neurological: as per above                                                                 Psychiatric:   Endocrine:              Hematologic/Lymphatic:     MEDICATIONS  (STANDING):  aspirin enteric coated 81 milliGRAM(s) Oral daily  atorvastatin 80 milliGRAM(s) Oral at bedtime  cefTRIAXone   IVPB 1 Gram(s) IV Intermittent every 24 hours  cefTRIAXone   IVPB      cholecalciferol 1000 Unit(s) Oral daily  donepezil 10 milliGRAM(s) Oral at bedtime  enoxaparin Injectable 40 milliGRAM(s) SubCutaneous daily  ergocalciferol 18722 Unit(s) Oral <User Schedule>  losartan 25 milliGRAM(s) Oral daily  meclizine 25 milliGRAM(s) Oral every 8 hours  memantine 10 milliGRAM(s) Oral daily  pantoprazole  Injectable 40 milliGRAM(s) IV Push daily    MEDICATIONS  (PRN):  aluminum hydroxide/magnesium hydroxide/simethicone Suspension 30 milliLiter(s) Oral every 6 hours PRN Dyspepsia  ondansetron Injectable 4 milliGRAM(s) IV Push every 8 hours PRN Nausea and/or Vomiting  sodium chloride 0.65% Nasal 1 Spray(s) Both Nostrils three times a day PRN Nasal Congestion      Allergies    No Known Allergies    Intolerances        Objective:   Vital Signs Last 24 Hrs  T(C): 36.6 (11 May 2019 19:16), Max: 36.8 (10 May 2019 23:31)  T(F): 97.8 (11 May 2019 19:16), Max: 98.3 (10 May 2019 23:31)  HR: 68 (11 May 2019 19:16) (65 - 85)  BP: 165/66 (11 May 2019 19:16) (145/61 - 165/66)  BP(mean): --  RR: 17 (11 May 2019 19:16) (16 - 18)  SpO2: 100% (11 May 2019 19:16) (98% - 100%)    General Exam:   General appearance: No acute distress                 Cardiovascular: Pedal dorsalis pulses intact bilaterally    Neurological Exam:  Mental Status: Orientated to self, date and place.  Attention intact.  No dysarthria, aphasia or neglect.  Knowledge intact.  Registration intact.  Short and long term memory grossly intact.      Cranial Nerves: CN I - not tested.  PERRL, EOMI, VFF, no nystagmus or diplopia.  No APD.  Fundi not visualized bilaterally.  CN V1-3 intact to light touch and pinprick.  No facial asymmetry.  Hearing intact to finger rub bilaterally.  Tongue, uvula and palate midline.  Sternocleidomastoid and Trapezius intact bilaterally.    Motor:   Tone: normal.                  Strength: intact throughout  Pronator drift: none                 Dysmeria: None to finger-nose-finger or heel-shin-heel  No truncal ataxia.    Tremor: No resting, postural or action tremor.  No myoclonus.    Sensation: intact to light touch, pinprick, vibration and proprioception    Deep Tendon Reflexes: 1+ bilateral biceps, triceps, brachioradialis, knee and ankle  Toes flexor bilaterally    Gait: normal and stable.      Other:    05-11    143  |  110<H>  |  17  ----------------------------<  112<H>  4.2   |  27  |  0.89    Ca    9.1      11 May 2019 06:42  Phos  3.5     05-11  Mg     2.3     05-11 05-11    143  |  110<H>  |  17  ----------------------------<  112<H>  4.2   |  27  |  0.89    Ca    9.1      11 May 2019 06:42  Phos  3.5     05-11  Mg     2.3     05-11          Radiology Neurology Follow up note    Name  JUDE BILLY    Subjective:  vertigo improved but still present  has headache when has abdominal paon    Review of Systems:  Constitutional:      no fever  Gastrointestinal:               has abd pain                        MEDICATIONS  (STANDING):  aspirin enteric coated 81 milliGRAM(s) Oral daily  atorvastatin 80 milliGRAM(s) Oral at bedtime  cefTRIAXone   IVPB 1 Gram(s) IV Intermittent every 24 hours  cefTRIAXone   IVPB      cholecalciferol 1000 Unit(s) Oral daily  donepezil 10 milliGRAM(s) Oral at bedtime  enoxaparin Injectable 40 milliGRAM(s) SubCutaneous daily  ergocalciferol 40900 Unit(s) Oral <User Schedule>  losartan 25 milliGRAM(s) Oral daily  meclizine 25 milliGRAM(s) Oral every 8 hours  memantine 10 milliGRAM(s) Oral daily  pantoprazole  Injectable 40 milliGRAM(s) IV Push daily    MEDICATIONS  (PRN):  aluminum hydroxide/magnesium hydroxide/simethicone Suspension 30 milliLiter(s) Oral every 6 hours PRN Dyspepsia  ondansetron Injectable 4 milliGRAM(s) IV Push every 8 hours PRN Nausea and/or Vomiting  sodium chloride 0.65% Nasal 1 Spray(s) Both Nostrils three times a day PRN Nasal Congestion      Allergies    No Known Allergies    Intolerances        Objective:   Vital Signs Last 24 Hrs  T(C): 36.6 (11 May 2019 19:16), Max: 36.8 (10 May 2019 23:31)  T(F): 97.8 (11 May 2019 19:16), Max: 98.3 (10 May 2019 23:31)  HR: 68 (11 May 2019 19:16) (65 - 85)  BP: 165/66 (11 May 2019 19:16) (145/61 - 165/66)  BP(mean): --  RR: 17 (11 May 2019 19:16) (16 - 18)  SpO2: 100% (11 May 2019 19:16) (98% - 100%)    General Exam:   General appearance: No acute distress                 Cardiovascular: Pedal dorsalis pulses intact bilaterally    Neurological Exam:  Mental Status: Orientated to self, date and place.  Attention intact.  No dysarthria, aphasia or neglect.  Cranial Nerves: CN I - not tested.  PERRL, EOMI, VFF, no nystagmus or diplopia.  No APD.  Fundi not visualized bilaterally.  CN V1-3 intact to light touch.  No facial asymmetry.    Motor:   Tone: normal.                  Strength: intact throughout         Dysmeria: None to finger-nose-finger or heel-shin-heel    Sensation: intact to light touch    Other:    05-11    143  |  110<H>  |  17  ----------------------------<  112<H>  4.2   |  27  |  0.89    Ca    9.1      11 May 2019 06:42  Phos  3.5     05-11  Mg     2.3     05-11 05-11    143  |  110<H>  |  17  ----------------------------<  112<H>  4.2   |  27  |  0.89    Ca    9.1      11 May 2019 06:42  Phos  3.5     05-11  Mg     2.3     05-11          Radiology

## 2019-05-11 NOTE — PROGRESS NOTE ADULT - PROBLEM SELECTOR PLAN 1
presented with room spinning sensation, worsening with head movement and improvement with rest and closing eyes  nystagmus negative and orthostatic negative on lying down and sitting  central vs veritgo    CT head: Mild chronic microvascular changes without evidence of an acute transcortical infarction or hemorrhage.  T1 0.054, T2 of 0.067 trend serial cardiac enzymes   EKG - NSR no stt wave changes  follow ECHO, Carotid doppler   neuro consult in AM  will hold HTN medications for permissive HTN  starting on aspirin, atorvastatin and meclizine standing   tele monitoring and neruo checks   PT consult  CD showed no hemodynamically significant internal carotid artery stenosis by velocity criteria. Irregular arterial pulses are noted.  NBNB emesis today, N/V improved after Zofran, IV Protonix and Clear Liquid diet also placed  2 bouts of diarrhea today, diarrhea persists until tomorrow, will order C. difficile and stool studies  F/u TTE  F/u PT  F/u US renal  F/u Laxatives  F/u Bcx, Ucx Presented with room spinning sensation, worsening with head movement and improvement with rest and closing eyes. Nystagmus negative and orthostatic negative on lying down and sitting. DDx Central vs veritgo. CT head: Mild chronic microvascular changes without evidence of an acute transcortical infarction or hemorrhage. Cardiac enzymes non-significant. EKG - NSR no stt wave changes. CD showed no hemodynamically significant internal carotid artery stenosis by velocity criteria. Irregular arterial pulses are noted.  -Lactate normalized to 1.6  -C/w aspirin, atorvastatin, and meclizine  -C/w tele monitoring and neruo checks   -PT consult  -NBNB emesis 5/10, N/V improved after Zofran, IV Protonix, and Clear Liquid diet  -Will advance diet today 5/11  -2 bouts of diarrhea 5/10, diarrhea did not persist, so no stool labs sent  -F/u TTE  -F/u PT  -F/u US renal  -F/u Laxatives  -UCx negative  -F/u BCx  -F/u MR head with no contrast per Neuro  -Orthostatic BP Q shift per Cardio

## 2019-05-11 NOTE — PROGRESS NOTE ADULT - ASSESSMENT
Vertigo, central vs. peripheral  MRI brain to r/o cva  cw Meclazine  naproxyn prn for headache Vertigo, central vs. peripheral cause  MRI brain to r/o cva  cw Meclazine  naproxyn prn for headache prn

## 2019-05-12 LAB
ANION GAP SERPL CALC-SCNC: 7 MMOL/L — SIGNIFICANT CHANGE UP (ref 5–17)
BUN SERPL-MCNC: 15 MG/DL — SIGNIFICANT CHANGE UP (ref 7–18)
CALCIUM SERPL-MCNC: 9 MG/DL — SIGNIFICANT CHANGE UP (ref 8.4–10.5)
CHLORIDE SERPL-SCNC: 107 MMOL/L — SIGNIFICANT CHANGE UP (ref 96–108)
CO2 SERPL-SCNC: 28 MMOL/L — SIGNIFICANT CHANGE UP (ref 22–31)
CREAT SERPL-MCNC: 0.92 MG/DL — SIGNIFICANT CHANGE UP (ref 0.5–1.3)
GLUCOSE SERPL-MCNC: 119 MG/DL — HIGH (ref 70–99)
HCT VFR BLD CALC: 41.3 % — SIGNIFICANT CHANGE UP (ref 34.5–45)
HGB BLD-MCNC: 13.2 G/DL — SIGNIFICANT CHANGE UP (ref 11.5–15.5)
MAGNESIUM SERPL-MCNC: 2.5 MG/DL — SIGNIFICANT CHANGE UP (ref 1.6–2.6)
MCHC RBC-ENTMCNC: 29.9 PG — SIGNIFICANT CHANGE UP (ref 27–34)
MCHC RBC-ENTMCNC: 32 GM/DL — SIGNIFICANT CHANGE UP (ref 32–36)
MCV RBC AUTO: 93.4 FL — SIGNIFICANT CHANGE UP (ref 80–100)
NRBC # BLD: 0 /100 WBCS — SIGNIFICANT CHANGE UP (ref 0–0)
PHOSPHATE SERPL-MCNC: 3.3 MG/DL — SIGNIFICANT CHANGE UP (ref 2.5–4.5)
PLATELET # BLD AUTO: 289 K/UL — SIGNIFICANT CHANGE UP (ref 150–400)
POTASSIUM SERPL-MCNC: 4.3 MMOL/L — SIGNIFICANT CHANGE UP (ref 3.5–5.3)
POTASSIUM SERPL-SCNC: 4.3 MMOL/L — SIGNIFICANT CHANGE UP (ref 3.5–5.3)
RBC # BLD: 4.42 M/UL — SIGNIFICANT CHANGE UP (ref 3.8–5.2)
RBC # FLD: 12.8 % — SIGNIFICANT CHANGE UP (ref 10.3–14.5)
SODIUM SERPL-SCNC: 142 MMOL/L — SIGNIFICANT CHANGE UP (ref 135–145)
WBC # BLD: 8.31 K/UL — SIGNIFICANT CHANGE UP (ref 3.8–10.5)
WBC # FLD AUTO: 8.31 K/UL — SIGNIFICANT CHANGE UP (ref 3.8–10.5)

## 2019-05-12 PROCEDURE — 99232 SBSQ HOSP IP/OBS MODERATE 35: CPT

## 2019-05-12 PROCEDURE — 70551 MRI BRAIN STEM W/O DYE: CPT | Mod: 26

## 2019-05-12 RX ORDER — LOSARTAN POTASSIUM 100 MG/1
25 TABLET, FILM COATED ORAL
Refills: 0 | Status: DISCONTINUED | OUTPATIENT
Start: 2019-05-12 | End: 2019-05-13

## 2019-05-12 RX ORDER — ACETAMINOPHEN 500 MG
650 TABLET ORAL EVERY 6 HOURS
Refills: 0 | Status: DISCONTINUED | OUTPATIENT
Start: 2019-05-12 | End: 2019-05-19

## 2019-05-12 RX ADMIN — Medication 650 MILLIGRAM(S): at 21:21

## 2019-05-12 RX ADMIN — Medication 30 MILLILITER(S): at 13:02

## 2019-05-12 RX ADMIN — LOSARTAN POTASSIUM 25 MILLIGRAM(S): 100 TABLET, FILM COATED ORAL at 05:37

## 2019-05-12 RX ADMIN — Medication 1000 UNIT(S): at 13:01

## 2019-05-12 RX ADMIN — Medication 650 MILLIGRAM(S): at 22:00

## 2019-05-12 RX ADMIN — DONEPEZIL HYDROCHLORIDE 10 MILLIGRAM(S): 10 TABLET, FILM COATED ORAL at 21:14

## 2019-05-12 RX ADMIN — MEMANTINE HYDROCHLORIDE 10 MILLIGRAM(S): 10 TABLET ORAL at 13:01

## 2019-05-12 RX ADMIN — ONDANSETRON 4 MILLIGRAM(S): 8 TABLET, FILM COATED ORAL at 10:06

## 2019-05-12 RX ADMIN — Medication 25 MILLIGRAM(S): at 05:37

## 2019-05-12 RX ADMIN — PANTOPRAZOLE SODIUM 40 MILLIGRAM(S): 20 TABLET, DELAYED RELEASE ORAL at 13:01

## 2019-05-12 RX ADMIN — Medication 81 MILLIGRAM(S): at 13:03

## 2019-05-12 RX ADMIN — Medication 25 MILLIGRAM(S): at 21:13

## 2019-05-12 RX ADMIN — Medication 30 MILLILITER(S): at 05:37

## 2019-05-12 RX ADMIN — ENOXAPARIN SODIUM 40 MILLIGRAM(S): 100 INJECTION SUBCUTANEOUS at 13:01

## 2019-05-12 RX ADMIN — Medication 25 MILLIGRAM(S): at 13:04

## 2019-05-12 RX ADMIN — LOSARTAN POTASSIUM 25 MILLIGRAM(S): 100 TABLET, FILM COATED ORAL at 17:56

## 2019-05-12 RX ADMIN — CEFTRIAXONE 100 GRAM(S): 500 INJECTION, POWDER, FOR SOLUTION INTRAMUSCULAR; INTRAVENOUS at 12:59

## 2019-05-12 RX ADMIN — ATORVASTATIN CALCIUM 80 MILLIGRAM(S): 80 TABLET, FILM COATED ORAL at 21:14

## 2019-05-12 NOTE — PHYSICAL THERAPY INITIAL EVALUATION ADULT - CRITERIA FOR SKILLED THERAPEUTIC INTERVENTIONS
functional limitations in following categories/rehab potential/impairments found/therapy frequency/risk reduction/prevention/predicted duration of therapy intervention

## 2019-05-12 NOTE — PHYSICAL THERAPY INITIAL EVALUATION ADULT - GENERAL OBSERVATIONS, REHAB EVAL
Chart (EMR) reviewed. Received supine c HOB elevated, NAD. Language barrier(Danish). Rehab volunteer Vaishali act as  preferred by patient. +O2 2lpm via nasal cannula. Alert. Ox4. Able to follow multistep commands/directions.

## 2019-05-12 NOTE — PHYSICAL THERAPY INITIAL EVALUATION ADULT - ADDITIONAL COMMENTS
Patient lives c daughter in a pvt house c 3 entry steps (no rails), 1 flight of stairs c R rail up inside home. Independent c all ADL's and ambulation with straight cane prn.

## 2019-05-12 NOTE — PROGRESS NOTE ADULT - SUBJECTIVE AND OBJECTIVE BOX
Patient is a 84y old  Female who presents with a chief complaint of dizziness and upper abdominal pain (11 May 2019 20:46)    pt seen in tele [ x ], reg med floor [   ], bed [ x ], chair at bedside [   ], a+o x3 [  ], lethargic [  ],  nad [ x ]      Allergies    No Known Allergies        Vitals    T(F): 98.4 (05-12-19 @ 04:52), Max: 98.4 (05-11-19 @ 23:41)  HR: 66 (05-12-19 @ 04:52) (63 - 73)  BP: 167/74 (05-12-19 @ 04:52) (145/61 - 167/74)  RR: 17 (05-12-19 @ 04:52) (16 - 18)  SpO2: 97% (05-12-19 @ 04:52) (97% - 100%)  Wt(kg): --  CAPILLARY BLOOD GLUCOSE          Labs                          13.0   8.50  )-----------( 297      ( 11 May 2019 07:25 )             39.6       05-11    143  |  110<H>  |  17  ----------------------------<  112<H>  4.2   |  27  |  0.89    Ca    9.1      11 May 2019 06:42  Phos  3.5     05-11  Mg     2.3     05-11        CARDIAC MARKERS ( 10 May 2019 06:04 )  0.066 ng/mL / x     / 26 U/L / x     / <1.0 ng/mL        .Blood  05-10 @ 19:03   No growth to date.  --  --      .Urine  05-10 @ 00:46   <10,000 CFU/mL Normal Urogenital Laura  --  --          Radiology Results      Meds    MEDICATIONS  (STANDING):  aspirin enteric coated 81 milliGRAM(s) Oral daily  atorvastatin 80 milliGRAM(s) Oral at bedtime  cefTRIAXone   IVPB 1 Gram(s) IV Intermittent every 24 hours  cefTRIAXone   IVPB      cholecalciferol 1000 Unit(s) Oral daily  donepezil 10 milliGRAM(s) Oral at bedtime  enoxaparin Injectable 40 milliGRAM(s) SubCutaneous daily  ergocalciferol 31566 Unit(s) Oral <User Schedule>  losartan 25 milliGRAM(s) Oral daily  meclizine 25 milliGRAM(s) Oral every 8 hours  memantine 10 milliGRAM(s) Oral daily  pantoprazole  Injectable 40 milliGRAM(s) IV Push daily      MEDICATIONS  (PRN):  aluminum hydroxide/magnesium hydroxide/simethicone Suspension 30 milliLiter(s) Oral every 6 hours PRN Dyspepsia  ondansetron Injectable 4 milliGRAM(s) IV Push every 8 hours PRN Nausea and/or Vomiting  sodium chloride 0.65% Nasal 1 Spray(s) Both Nostrils three times a day PRN Nasal Congestion      Physical Exam    Neuro :  no focal deficits except b/l nystagmus   Respiratory: CTA B/L  CV: RRR, S1S2, no murmurs,   Abdominal: Soft, NT, ND +BS,  Extremities: No edema, + peripheral pulses      ASSESSMENT    uti  Dizziness and giddiness  abd pain   vertigo   renal lesion   h/o HTN  cholecystectomy      PLAN    cont statin, asa, meclizine   cont tele   ua +ve   ceftriaxone   ct head noted   ct abd noted   f/u blood cx   urine cx wnl  f/u echo  carotid doppler noted -ve  f/u renal us   cardio f/u  orthostatic bp q shift   neuro f/u  f/u MRI brain   PT cons   continue current meds Patient is a 84y old  Female who presents with a chief complaint of dizziness and upper abdominal pain (11 May 2019 20:46)    pt seen in tele [ x ], reg med floor [   ], bed [ x ], chair at bedside [   ], a+o x3 [  ], lethargic [  ],  nad [ x ]    still with significant dizziness      Allergies    No Known Allergies        Vitals    T(F): 98.4 (05-12-19 @ 04:52), Max: 98.4 (05-11-19 @ 23:41)  HR: 66 (05-12-19 @ 04:52) (63 - 73)  BP: 167/74 (05-12-19 @ 04:52) (145/61 - 167/74)  RR: 17 (05-12-19 @ 04:52) (16 - 18)  SpO2: 97% (05-12-19 @ 04:52) (97% - 100%)  Wt(kg): --  CAPILLARY BLOOD GLUCOSE          Labs                          13.0   8.50  )-----------( 297      ( 11 May 2019 07:25 )             39.6       05-11    143  |  110<H>  |  17  ----------------------------<  112<H>  4.2   |  27  |  0.89    Ca    9.1      11 May 2019 06:42  Phos  3.5     05-11  Mg     2.3     05-11        CARDIAC MARKERS ( 10 May 2019 06:04 )  0.066 ng/mL / x     / 26 U/L / x     / <1.0 ng/mL        .Blood  05-10 @ 19:03   No growth to date.  --  --      .Urine  05-10 @ 00:46   <10,000 CFU/mL Normal Urogenital Laura  --  --          Radiology Results      Meds    MEDICATIONS  (STANDING):  aspirin enteric coated 81 milliGRAM(s) Oral daily  atorvastatin 80 milliGRAM(s) Oral at bedtime  cefTRIAXone   IVPB 1 Gram(s) IV Intermittent every 24 hours  cefTRIAXone   IVPB      cholecalciferol 1000 Unit(s) Oral daily  donepezil 10 milliGRAM(s) Oral at bedtime  enoxaparin Injectable 40 milliGRAM(s) SubCutaneous daily  ergocalciferol 86316 Unit(s) Oral <User Schedule>  losartan 25 milliGRAM(s) Oral daily  meclizine 25 milliGRAM(s) Oral every 8 hours  memantine 10 milliGRAM(s) Oral daily  pantoprazole  Injectable 40 milliGRAM(s) IV Push daily      MEDICATIONS  (PRN):  aluminum hydroxide/magnesium hydroxide/simethicone Suspension 30 milliLiter(s) Oral every 6 hours PRN Dyspepsia  ondansetron Injectable 4 milliGRAM(s) IV Push every 8 hours PRN Nausea and/or Vomiting  sodium chloride 0.65% Nasal 1 Spray(s) Both Nostrils three times a day PRN Nasal Congestion      Physical Exam    Neuro :  no focal deficits except b/l nystagmus   Respiratory: CTA B/L  CV: RRR, S1S2, no murmurs,   Abdominal: Soft, NT, ND +BS,  Extremities: No edema, + peripheral pulses      ASSESSMENT    uti  Dizziness and giddiness  abd pain   vertigo   renal lesion   h/o HTN  cholecystectomy      PLAN    cont meclizine  trial ativan   cont statin, asa, meclizine   cont tele   ua +ve   ceftriaxone   ct head noted   ct abd noted   f/u blood cx   urine cx wnl  f/u echo  carotid doppler noted -ve  f/u renal us   cardio f/u  orthostatic bp q shift   neuro f/u  f/u MRI brain   PT cons   continue current meds Patient is a 84y old  Female who presents with a chief complaint of dizziness and upper abdominal pain (11 May 2019 20:46)    pt seen in tele [ x ], reg med floor [   ], bed [ x ], chair at bedside [   ], a+o x3 [  ], lethargic [  ],  nad [ x ]    still with significant dizziness, also with complaints of ruq pain      Allergies    No Known Allergies        Vitals    T(F): 98.4 (05-12-19 @ 04:52), Max: 98.4 (05-11-19 @ 23:41)  HR: 66 (05-12-19 @ 04:52) (63 - 73)  BP: 167/74 (05-12-19 @ 04:52) (145/61 - 167/74)  RR: 17 (05-12-19 @ 04:52) (16 - 18)  SpO2: 97% (05-12-19 @ 04:52) (97% - 100%)  Wt(kg): --  CAPILLARY BLOOD GLUCOSE          Labs                          13.0   8.50  )-----------( 297      ( 11 May 2019 07:25 )             39.6       05-11    143  |  110<H>  |  17  ----------------------------<  112<H>  4.2   |  27  |  0.89    Ca    9.1      11 May 2019 06:42  Phos  3.5     05-11  Mg     2.3     05-11        CARDIAC MARKERS ( 10 May 2019 06:04 )  0.066 ng/mL / x     / 26 U/L / x     / <1.0 ng/mL        .Blood  05-10 @ 19:03   No growth to date.  --  --      .Urine  05-10 @ 00:46   <10,000 CFU/mL Normal Urogenital Laura  --  --          Radiology Results      Meds    MEDICATIONS  (STANDING):  aspirin enteric coated 81 milliGRAM(s) Oral daily  atorvastatin 80 milliGRAM(s) Oral at bedtime  cefTRIAXone   IVPB 1 Gram(s) IV Intermittent every 24 hours  cefTRIAXone   IVPB      cholecalciferol 1000 Unit(s) Oral daily  donepezil 10 milliGRAM(s) Oral at bedtime  enoxaparin Injectable 40 milliGRAM(s) SubCutaneous daily  ergocalciferol 43695 Unit(s) Oral <User Schedule>  losartan 25 milliGRAM(s) Oral daily  meclizine 25 milliGRAM(s) Oral every 8 hours  memantine 10 milliGRAM(s) Oral daily  pantoprazole  Injectable 40 milliGRAM(s) IV Push daily      MEDICATIONS  (PRN):  aluminum hydroxide/magnesium hydroxide/simethicone Suspension 30 milliLiter(s) Oral every 6 hours PRN Dyspepsia  ondansetron Injectable 4 milliGRAM(s) IV Push every 8 hours PRN Nausea and/or Vomiting  sodium chloride 0.65% Nasal 1 Spray(s) Both Nostrils three times a day PRN Nasal Congestion      Physical Exam    Neuro :  no focal deficits except b/l nystagmus   Respiratory: CTA B/L  CV: RRR, S1S2, no murmurs,   Abdominal: Soft, NT, ND +BS,  Extremities: No edema, + peripheral pulses      ASSESSMENT    uti  Dizziness and giddiness  abd pain   vertigo   renal lesion   h/o HTN  cholecystectomy      PLAN    cont meclizine  trial ativan   order abd ultrasound   cont statin, asa, meclizine   cont tele   ua +ve   ceftriaxone   ct head noted   ct abd noted   f/u blood cx   urine cx wnl  f/u echo  carotid doppler noted -ve  f/u renal us   cardio f/u  orthostatic bp q shift   neuro f/u  f/u MRI brain   PT cons   continue current meds

## 2019-05-12 NOTE — PHYSICAL THERAPY INITIAL EVALUATION ADULT - IMPAIRMENTS FOUND, PT EVAL
gait, locomotion, and balance/aerobic capacity/endurance/muscle strength/joint integrity and mobility/neuromotor development and sensory integration

## 2019-05-12 NOTE — PHYSICAL THERAPY INITIAL EVALUATION ADULT - PERTINENT HX OF CURRENT PROBLEM, REHAB EVAL
Patient is a 83 y/o female admitted to [facility] due to dizziness and giddiness. CT of head, abdomen, and pelvis negative acute findings.

## 2019-05-12 NOTE — PHYSICAL THERAPY INITIAL EVALUATION ADULT - ACTIVE RANGE OF MOTION EXAMINATION, REHAB EVAL
bilateral lower extremity Active ROM was WNL (within normal limits)/justina. upper extremity Active ROM was WNL (within normal limits)

## 2019-05-12 NOTE — PROGRESS NOTE ADULT - SUBJECTIVE AND OBJECTIVE BOX
Neurology Follow up note    Name  JUDE BILLY    Subjective:  vertigo improved but has episodes when she stands up  had mri brain    Review of Systems:  Constitutional:      no fever  Gastrointestinal:       + abdominal pain                                MEDICATIONS  (STANDING):  aspirin enteric coated 81 milliGRAM(s) Oral daily  atorvastatin 80 milliGRAM(s) Oral at bedtime  cefTRIAXone   IVPB 1 Gram(s) IV Intermittent every 24 hours  cefTRIAXone   IVPB      cholecalciferol 1000 Unit(s) Oral daily  donepezil 10 milliGRAM(s) Oral at bedtime  enoxaparin Injectable 40 milliGRAM(s) SubCutaneous daily  ergocalciferol 98541 Unit(s) Oral <User Schedule>  losartan 25 milliGRAM(s) Oral two times a day  meclizine 25 milliGRAM(s) Oral every 8 hours  memantine 10 milliGRAM(s) Oral daily  pantoprazole  Injectable 40 milliGRAM(s) IV Push daily    MEDICATIONS  (PRN):  acetaminophen   Tablet .. 650 milliGRAM(s) Oral every 6 hours PRN Temp greater or equal to 38C (100.4F), Mild Pain (1 - 3)  aluminum hydroxide/magnesium hydroxide/simethicone Suspension 30 milliLiter(s) Oral every 6 hours PRN Dyspepsia  sodium chloride 0.65% Nasal 1 Spray(s) Both Nostrils three times a day PRN Nasal Congestion      Allergies    No Known Allergies    Intolerances        Objective:   Vital Signs Last 24 Hrs  T(C): 36.9 (12 May 2019 15:56), Max: 36.9 (11 May 2019 23:41)  T(F): 98.4 (12 May 2019 15:56), Max: 98.4 (11 May 2019 23:41)  HR: 62 (12 May 2019 15:56) (61 - 73)  BP: 149/50 (12 May 2019 15:56) (149/50 - 185/71)  BP(mean): --  RR: 18 (12 May 2019 15:56) (17 - 18)  SpO2: 99% (12 May 2019 15:56) (97% - 100%)    General Exam:   General appearance: No acute distress                 Cardiovascular: Pedal dorsalis pulses intact bilaterally    Neurological Exam:  Mental Status: Orientated to self, date and place.  Attention intact.  No dysarthria, aphasia or neglect.      Cranial Nerves: CN I - not tested.  PERRL, EOMI, VFF, no nystagmus or diplopia.  No APD.  Fundi not visualized bilaterally.  CN V1-3 intact to light touch.  No facial asymmetry.      Motor:   Tone: normal.                  Strength: intact throughout    Sensation: intact to light touch  Other:    05-12    142  |  107  |  15  ----------------------------<  119<H>  4.3   |  28  |  0.92    Ca    9.0      12 May 2019 07:18  Phos  3.3     05-12  Mg     2.5     05-12 05-12    142  |  107  |  15  ----------------------------<  119<H>  4.3   |  28  |  0.92    Ca    9.0      12 May 2019 07:18  Phos  3.3     05-12  Mg     2.5     05-12          Radiology  < from: MR Head No Cont (05.12.19 @ 11:55) >    EXAM:  MR BRAIN                            PROCEDURE DATE:  05/12/2019          INTERPRETATION:  EXAM:    MR Head Without Contrast     EXAM DATE/TIME:    5/12/2019 11:18 AM     CLINICAL HISTORY:    84 years old, female; CVA     TECHNIQUE:    Imaging protocol: MR of the head without contrast.     COMPARISON:    CT HEAD 5/9/2019 8:12 PM     FINDINGS:   There is no acute infarct, acute intracranial hemorrhage, mass effect or   midline shift. Age-related involutional changes and microvascular   ischemic changes are seen.    Ventricles and sulci are age-appropriate in size.    Major intracranial flow-voids are preserved.    Bilateral cataract surgery. The sellar and suprasellar structures and   craniocervical junction are unremarkable.    Retention cysts or polyps in the right maxillary sinus. Visualized   paranasal sinuses and mastoid air cells are otherwise clear.    IMPRESSION:   No evidence of acute intracranial pathology.                ANTHONY VALENTE M.D., ATTENDING RADIOLOGIST  This document has been electronically signed. May 12 2019  1:12PM              < end of copied text >

## 2019-05-12 NOTE — PROGRESS NOTE ADULT - ASSESSMENT
Peripheral vertigo    Recommend:  increase meclazine to 50mg tid, switch to prn when symptoms resolve  outpatient vestibular rehab  [facility]

## 2019-05-13 LAB
ANION GAP SERPL CALC-SCNC: 6 MMOL/L — SIGNIFICANT CHANGE UP (ref 5–17)
BUN SERPL-MCNC: 15 MG/DL — SIGNIFICANT CHANGE UP (ref 7–18)
CALCIUM SERPL-MCNC: 8.8 MG/DL — SIGNIFICANT CHANGE UP (ref 8.4–10.5)
CHLORIDE SERPL-SCNC: 108 MMOL/L — SIGNIFICANT CHANGE UP (ref 96–108)
CO2 SERPL-SCNC: 29 MMOL/L — SIGNIFICANT CHANGE UP (ref 22–31)
CREAT SERPL-MCNC: 1.03 MG/DL — SIGNIFICANT CHANGE UP (ref 0.5–1.3)
GLUCOSE SERPL-MCNC: 108 MG/DL — HIGH (ref 70–99)
HCT VFR BLD CALC: 40 % — SIGNIFICANT CHANGE UP (ref 34.5–45)
HGB BLD-MCNC: 12.9 G/DL — SIGNIFICANT CHANGE UP (ref 11.5–15.5)
MAGNESIUM SERPL-MCNC: 2.6 MG/DL — SIGNIFICANT CHANGE UP (ref 1.6–2.6)
MCHC RBC-ENTMCNC: 30.4 PG — SIGNIFICANT CHANGE UP (ref 27–34)
MCHC RBC-ENTMCNC: 32.3 GM/DL — SIGNIFICANT CHANGE UP (ref 32–36)
MCV RBC AUTO: 94.1 FL — SIGNIFICANT CHANGE UP (ref 80–100)
NRBC # BLD: 0 /100 WBCS — SIGNIFICANT CHANGE UP (ref 0–0)
PHOSPHATE SERPL-MCNC: 3 MG/DL — SIGNIFICANT CHANGE UP (ref 2.5–4.5)
PLATELET # BLD AUTO: 267 K/UL — SIGNIFICANT CHANGE UP (ref 150–400)
POTASSIUM SERPL-MCNC: 3.7 MMOL/L — SIGNIFICANT CHANGE UP (ref 3.5–5.3)
POTASSIUM SERPL-SCNC: 3.7 MMOL/L — SIGNIFICANT CHANGE UP (ref 3.5–5.3)
RBC # BLD: 4.25 M/UL — SIGNIFICANT CHANGE UP (ref 3.8–5.2)
RBC # FLD: 12.7 % — SIGNIFICANT CHANGE UP (ref 10.3–14.5)
SODIUM SERPL-SCNC: 143 MMOL/L — SIGNIFICANT CHANGE UP (ref 135–145)
WBC # BLD: 8.88 K/UL — SIGNIFICANT CHANGE UP (ref 3.8–10.5)
WBC # FLD AUTO: 8.88 K/UL — SIGNIFICANT CHANGE UP (ref 3.8–10.5)

## 2019-05-13 RX ORDER — LOSARTAN POTASSIUM 100 MG/1
50 TABLET, FILM COATED ORAL
Refills: 0 | Status: DISCONTINUED | OUTPATIENT
Start: 2019-05-13 | End: 2019-05-14

## 2019-05-13 RX ORDER — PANTOPRAZOLE SODIUM 20 MG/1
40 TABLET, DELAYED RELEASE ORAL
Refills: 0 | Status: DISCONTINUED | OUTPATIENT
Start: 2019-05-13 | End: 2019-05-19

## 2019-05-13 RX ORDER — MECLIZINE HCL 12.5 MG
50 TABLET ORAL EVERY 8 HOURS
Refills: 0 | Status: DISCONTINUED | OUTPATIENT
Start: 2019-05-13 | End: 2019-05-15

## 2019-05-13 RX ORDER — PANTOPRAZOLE SODIUM 20 MG/1
1 TABLET, DELAYED RELEASE ORAL
Qty: 30 | Refills: 0
Start: 2019-05-13 | End: 2019-06-11

## 2019-05-13 RX ORDER — MECLIZINE HCL 12.5 MG
2 TABLET ORAL
Qty: 84 | Refills: 0
Start: 2019-05-13 | End: 2019-05-26

## 2019-05-13 RX ADMIN — Medication 30 MILLILITER(S): at 05:12

## 2019-05-13 RX ADMIN — LOSARTAN POTASSIUM 25 MILLIGRAM(S): 100 TABLET, FILM COATED ORAL at 05:12

## 2019-05-13 RX ADMIN — ENOXAPARIN SODIUM 40 MILLIGRAM(S): 100 INJECTION SUBCUTANEOUS at 12:19

## 2019-05-13 RX ADMIN — Medication 81 MILLIGRAM(S): at 12:19

## 2019-05-13 RX ADMIN — ATORVASTATIN CALCIUM 80 MILLIGRAM(S): 80 TABLET, FILM COATED ORAL at 21:49

## 2019-05-13 RX ADMIN — Medication 50 MILLIGRAM(S): at 14:29

## 2019-05-13 RX ADMIN — CEFTRIAXONE 100 GRAM(S): 500 INJECTION, POWDER, FOR SOLUTION INTRAMUSCULAR; INTRAVENOUS at 12:18

## 2019-05-13 RX ADMIN — Medication 650 MILLIGRAM(S): at 05:13

## 2019-05-13 RX ADMIN — LOSARTAN POTASSIUM 50 MILLIGRAM(S): 100 TABLET, FILM COATED ORAL at 17:30

## 2019-05-13 RX ADMIN — Medication 25 MILLIGRAM(S): at 05:12

## 2019-05-13 RX ADMIN — MEMANTINE HYDROCHLORIDE 10 MILLIGRAM(S): 10 TABLET ORAL at 12:19

## 2019-05-13 RX ADMIN — Medication 0.5 MILLIGRAM(S): at 17:30

## 2019-05-13 RX ADMIN — DONEPEZIL HYDROCHLORIDE 10 MILLIGRAM(S): 10 TABLET, FILM COATED ORAL at 21:50

## 2019-05-13 RX ADMIN — Medication 1000 UNIT(S): at 12:18

## 2019-05-13 RX ADMIN — Medication 50 MILLIGRAM(S): at 21:50

## 2019-05-13 RX ADMIN — Medication 1 SPRAY(S): at 05:15

## 2019-05-13 NOTE — PROGRESS NOTE ADULT - ASSESSMENT
84 yr old Female form home lives with daughter independent at baseline with PMHX of dementia, HTN, HLD presented with dizziness and upper abdominal pain,bradycardia.   1.PT.  2.Stress test-r/o ischemia.  3.Bradycardia due to atenolol, hr stable .  4.Lipid d/o-statin.  5.Abdominal pain-GI eval.  6.HTN-Inc cozaar 50mg bid  7.Prediabestes-diet.  8.GI and DVT prophylaxis.

## 2019-05-13 NOTE — PROGRESS NOTE ADULT - SUBJECTIVE AND OBJECTIVE BOX
CHIEF COMPLAINT:Patient is a 84y old  Female who presents with a chief complaint of dizziness and upper abdominal pain.Pt appears comfortable.    	  REVIEW OF SYSTEMS:  CONSTITUTIONAL: No fever, weight loss, or fatigue  EYES: No eye pain, visual disturbances, or discharge  ENT:  No difficulty hearing, tinnitus, vertigo; No sinus or throat pain  NECK: No pain or stiffness  RESPIRATORY: No cough, wheezing, chills or hemoptysis; No Shortness of Breath  CARDIOVASCULAR: No chest pain, palpitations, passing out, dizziness, or leg swelling  GASTROINTESTINAL: No abdominal or epigastric pain. No nausea, vomiting, or hematemesis; No diarrhea or constipation. No melena or hematochezia.  GENITOURINARY: No dysuria, frequency, hematuria, or incontinence  NEUROLOGICAL: No headaches, memory loss, loss of strength, numbness, or tremors  SKIN: No itching, burning, rashes, or lesions   LYMPH Nodes: No enlarged glands  ENDOCRINE: No heat or cold intolerance; No hair loss  MUSCULOSKELETAL: No joint pain or swelling; No muscle, back, or extremity pain  PSYCHIATRIC: No depression, anxiety, mood swings, or difficulty sleeping  HEME/LYMPH: No easy bruising, or bleeding gums  ALLERGY AND IMMUNOLOGIC: No hives or eczema	    PHYSICAL EXAM:  T(C): 36.4 (05-13-19 @ 07:20), Max: 37 (05-12-19 @ 23:58)  HR: 77 (05-13-19 @ 07:20) (62 - 77)  BP: 179/77 (05-13-19 @ 07:20) (149/50 - 179/77)  RR: 18 (05-13-19 @ 07:20) (17 - 18)  SpO2: 99% (05-13-19 @ 07:20) (98% - 100%)  Wt(kg): --  I&O's Summary      Appearance: Normal	  HEENT:   Normal oral mucosa, PERRL, EOMI	  Lymphatic: No lymphadenopathy  Cardiovascular: Normal S1 S2, No JVD, No murmurs, No edema  Respiratory: Lungs clear to auscultation	  Psychiatry: A & O x 3, Mood & affect appropriate  Gastrointestinal:  Soft, Non-tender, + BS	  Skin: No rashes, No ecchymoses, No cyanosis	  Neurologic: Non-focal  Extremities: Normal range of motion, No clubbing, cyanosis or edema  Vascular: Peripheral pulses palpable 2+ bilaterally    MEDICATIONS  (STANDING):  aspirin enteric coated 81 milliGRAM(s) Oral daily  atorvastatin 80 milliGRAM(s) Oral at bedtime  cefTRIAXone   IVPB 1 Gram(s) IV Intermittent every 24 hours  cefTRIAXone   IVPB      cholecalciferol 1000 Unit(s) Oral daily  donepezil 10 milliGRAM(s) Oral at bedtime  enoxaparin Injectable 40 milliGRAM(s) SubCutaneous daily  ergocalciferol 68694 Unit(s) Oral <User Schedule>  losartan 50 milliGRAM(s) Oral two times a day  meclizine 50 milliGRAM(s) Oral every 8 hours  memantine 10 milliGRAM(s) Oral daily  pantoprazole    Tablet 40 milliGRAM(s) Oral before breakfast      LABS:	 	                        12.9   8.88  )-----------( 267      ( 13 May 2019 06:32 )             40.0     05-13    143  |  108  |  15  ----------------------------<  108<H>  3.7   |  29  |  1.03    Ca    8.8      13 May 2019 06:32  Phos  3.0     05-13  Mg     2.6     05-13        Lipid Profile: Cholesterol 198    HDL 71      HgA1c: Hemoglobin A1C, Whole Blood: 5.8 % (05-10 @ 09:22)    TSH: Thyroid Stimulating Hormone, Serum: 1.07 uU/mL (05-10 @ 06:04)      	  EXAM:  MR BRAIN                            PROCEDURE DATE:  05/12/2019          INTERPRETATION:  EXAM:    MR Head Without Contrast     EXAM DATE/TIME:    5/12/2019 11:18 AM     CLINICAL HISTORY:    84 years old, female; CVA     TECHNIQUE:    Imaging protocol: MR of the head without contrast.     COMPARISON:    CT HEAD 5/9/2019 8:12 PM     FINDINGS:   There is no acute infarct, acute intracranial hemorrhage, mass effect or   midline shift. Age-related involutional changes and microvascular   ischemic changes are seen.    Ventricles and sulci are age-appropriate in size.    Major intracranial flow-voids are preserved.    Bilateral cataract surgery. The sellar and suprasellar structures and   craniocervical junction are unremarkable.    Retention cysts or polyps in the right maxillary sinus. Visualized   paranasal sinuses and mastoid air cells are otherwise clear.    IMPRESSION:   No evidence of acute intracranial pathology.

## 2019-05-13 NOTE — PROGRESS NOTE ADULT - SUBJECTIVE AND OBJECTIVE BOX
Patient is a 84y old  Female who presents with a chief complaint of dizziness and upper abdominal pain (12 May 2019 18:53)      pt seen in tele [ x ], reg med floor [   ], bed [ x ], chair at bedside [   ], a+o x3 [  ], lethargic [  ],  nad [ x ]    still with significant dizziness, also with complaints of ruq pain          Allergies    No Known Allergies        Vitals    T(F): 97.6 (05-13-19 @ 07:20), Max: 98.6 (05-12-19 @ 23:58)  HR: 77 (05-13-19 @ 07:20) (62 - 77)  BP: 179/77 (05-13-19 @ 07:20) (149/50 - 179/77)  RR: 18 (05-13-19 @ 07:20) (17 - 18)  SpO2: 99% (05-13-19 @ 07:20) (98% - 100%)  Wt(kg): --  CAPILLARY BLOOD GLUCOSE          Labs                          12.9   8.88  )-----------( 267      ( 13 May 2019 06:32 )             40.0       05-13    143  |  108  |  15  ----------------------------<  108<H>  3.7   |  29  |  1.03    Ca    8.8      13 May 2019 06:32  Phos  3.0     05-13  Mg     2.6     05-13              .Blood  05-10 @ 19:03   No growth to date.  --  --      .Urine  05-10 @ 00:46   <10,000 CFU/mL Normal Urogenital Laura  --  --          Radiology Results      Meds    MEDICATIONS  (STANDING):  aspirin enteric coated 81 milliGRAM(s) Oral daily  atorvastatin 80 milliGRAM(s) Oral at bedtime  cefTRIAXone   IVPB 1 Gram(s) IV Intermittent every 24 hours  cefTRIAXone   IVPB      cholecalciferol 1000 Unit(s) Oral daily  donepezil 10 milliGRAM(s) Oral at bedtime  enoxaparin Injectable 40 milliGRAM(s) SubCutaneous daily  ergocalciferol 22093 Unit(s) Oral <User Schedule>  losartan 50 milliGRAM(s) Oral two times a day  meclizine 50 milliGRAM(s) Oral every 8 hours  memantine 10 milliGRAM(s) Oral daily  pantoprazole    Tablet 40 milliGRAM(s) Oral before breakfast      MEDICATIONS  (PRN):  acetaminophen   Tablet .. 650 milliGRAM(s) Oral every 6 hours PRN Temp greater or equal to 38C (100.4F), Mild Pain (1 - 3)  aluminum hydroxide/magnesium hydroxide/simethicone Suspension 30 milliLiter(s) Oral every 6 hours PRN Dyspepsia  sodium chloride 0.65% Nasal 1 Spray(s) Both Nostrils three times a day PRN Nasal Congestion      Physical Exam    Neuro :  no focal deficits except b/l nystagmus   Respiratory: CTA B/L  CV: RRR, S1S2, no murmurs,   Abdominal: Soft, NT, ND +BS,  Extremities: No edema, + peripheral pulses      ASSESSMENT    uti  Dizziness and giddiness  abd pain   vertigo   renal lesion   h/o HTN  cholecystectomy      PLAN    cont meclizine  trial ativan   order abd ultrasound   cont statin, asa, meclizine   cont tele   ua +ve   ceftriaxone   ct head noted   ct abd noted   f/u blood cx   urine cx wnl  f/u echo  carotid doppler noted -ve  f/u renal us   cardio f/u  orthostatic bp q shift   neuro f/u  f/u MRI brain   PT cons   continue current meds Patient is a 84y old  Female who presents with a chief complaint of dizziness and upper abdominal pain (12 May 2019 18:53)      pt seen in tele [ x ], reg med floor [   ], bed [ x ], chair at bedside [   ], a+o x3 [  ], lethargic [  ],  nad [ x ]    still with significant dizziness, also with complaints of ruq pain          Allergies    No Known Allergies        Vitals    T(F): 97.6 (05-13-19 @ 07:20), Max: 98.6 (05-12-19 @ 23:58)  HR: 77 (05-13-19 @ 07:20) (62 - 77)  BP: 179/77 (05-13-19 @ 07:20) (149/50 - 179/77)  RR: 18 (05-13-19 @ 07:20) (17 - 18)  SpO2: 99% (05-13-19 @ 07:20) (98% - 100%)  Wt(kg): --  CAPILLARY BLOOD GLUCOSE          Labs                          12.9   8.88  )-----------( 267      ( 13 May 2019 06:32 )             40.0       05-13    143  |  108  |  15  ----------------------------<  108<H>  3.7   |  29  |  1.03    Ca    8.8      13 May 2019 06:32  Phos  3.0     05-13  Mg     2.6     05-13              .Blood  05-10 @ 19:03   No growth to date.  --  --      .Urine  05-10 @ 00:46   <10,000 CFU/mL Normal Urogenital Laura  --  --      < from: Transthoracic Echocardiogram (05.10.19 @ 06:50) >  CONCLUSIONS:  1. Mitral annular calcification, and calcified mitral  leaflets with normal diastolic opening. Mild to moderate  mitral regurgitation.  2.  Mild aortic regurgitation.  3. Normal left ventricular internal dimensions and wall  thicknesses.  4. Normal left ventricular systolic function. Mild  diastolic dysfunction (stage I).  5. Normal right ventricular size and function.  EF > 55%  < end of copied text >      Radiology Results    MRI HEAD   IMPRESSION:   No evidence of acute intracranial pathology.      Renal U/S   IMPRESSION:    Left renal cyst.          Meds    MEDICATIONS  (STANDING):  aspirin enteric coated 81 milliGRAM(s) Oral daily  atorvastatin 80 milliGRAM(s) Oral at bedtime  cefTRIAXone   IVPB 1 Gram(s) IV Intermittent every 24 hours  cefTRIAXone   IVPB      cholecalciferol 1000 Unit(s) Oral daily  donepezil 10 milliGRAM(s) Oral at bedtime  enoxaparin Injectable 40 milliGRAM(s) SubCutaneous daily  ergocalciferol 99305 Unit(s) Oral <User Schedule>  losartan 50 milliGRAM(s) Oral two times a day  meclizine 50 milliGRAM(s) Oral every 8 hours  memantine 10 milliGRAM(s) Oral daily  pantoprazole    Tablet 40 milliGRAM(s) Oral before breakfast      MEDICATIONS  (PRN):  acetaminophen   Tablet .. 650 milliGRAM(s) Oral every 6 hours PRN Temp greater or equal to 38C (100.4F), Mild Pain (1 - 3)  aluminum hydroxide/magnesium hydroxide/simethicone Suspension 30 milliLiter(s) Oral every 6 hours PRN Dyspepsia  sodium chloride 0.65% Nasal 1 Spray(s) Both Nostrils three times a day PRN Nasal Congestion      Physical Exam    Neuro :  no focal deficits except b/l nystagmus   Respiratory: CTA B/L  CV: RRR, S1S2, no murmurs,   Abdominal: Soft, NT, ND +BS,  Extremities: No edema, + peripheral pulses      ASSESSMENT    uti  Dizziness and giddiness  abd pain   vertigo   renal lesion   h/o HTN  cholecystectomy      PLAN    cont meclizine  trial ativan   order abd ultrasound   cont statin, asa, meclizine   cont tele   ua +ve   ceftriaxone   ct head noted   ct abd noted   f/u blood cx   urine cx wnl  echo noted EF > 55%   carotid doppler noted -ve  renal us noted : left renal cyst   cardio f/u  orthostatic bp q shift   stress test r/o ischemia   neuro f/u  MRI brain noted wnl   PT cons   continue current meds

## 2019-05-13 NOTE — PROGRESS NOTE ADULT - PROBLEM SELECTOR PLAN 1
Presented with room spinning sensation, worsening with head movement and improvement with rest and closing eyes. Nystagmus negative and orthostatic negative on lying down and sitting. DDx Central vs veritgo. CT head: Mild chronic microvascular changes without evidence of an acute transcortical infarction or hemorrhage. Cardiac enzymes non-significant. EKG - NSR no stt wave changes. CD showed no hemodynamically significant internal carotid artery stenosis by velocity criteria. Irregular arterial pulses are noted.  -Lactate normalized to 1.6  -C/w aspirin, atorvastatin, and meclizine  -C/w tele monitoring and neruo checks   -UCx negative  -BCx negative to final report  -Orthostatic pressure was positive 5/13 AM  -MR brain neg  -US renal only showed L renal showed without acute pathology  -Losartan increased to 50 mg QD per Cardiology, will f/u BP tomorrow  -Dizziness likely 2/2 peripheral vertigo. Meclizine dosage increased to 50 mg once a day with persistnet and significant dizziness.  -Meclizine will be switched to PRN basis when dizziness improves  -Trial of Ativan started today for dizziness per Neuro  -TTE and Stress NL  -Pt to f/u with OP vestibular rehab  -F/u PT

## 2019-05-13 NOTE — PROGRESS NOTE ADULT - SUBJECTIVE AND OBJECTIVE BOX
PGY 1 Note discussed with supervising resident and primary attending    Patient is a 84y old  Female who presents with a chief complaint of dizziness and upper abdominal pain (13 May 2019 10:34)      INTERVAL HPI/OVERNIGHT EVENTS: No acute events overnight, remains afebrile; HD stable, H/H stable, WBC WNL  -Pt reports no new medical problems  -Orthostatic pressure was positive 5/13 AM  -BCx negative to final report  -MR brain neg  -US renal only showed L renal showed without acute pathology  -Losartan increased to 50 mg QD per Cardiology, will f/u BP tomorrow  -Dizziness likely 2/2 peripheral vertigo. Meclizine dosage increased to 50 mg once a day with persistnet and significant dizziness.  -Meclizine will be switched to PRN basis when dizziness improves  -Trial of Ativan started today for dizziness per Neuro  -Pt to f/u with OP vestibular rehab  -F/u PT  -F/u abdominal US for persistent abdominal pain    MEDICATIONS  (STANDING):  aspirin enteric coated 81 milliGRAM(s) Oral daily  atorvastatin 80 milliGRAM(s) Oral at bedtime  cefTRIAXone   IVPB 1 Gram(s) IV Intermittent every 24 hours  cefTRIAXone   IVPB      cholecalciferol 1000 Unit(s) Oral daily  donepezil 10 milliGRAM(s) Oral at bedtime  enoxaparin Injectable 40 milliGRAM(s) SubCutaneous daily  ergocalciferol 06439 Unit(s) Oral <User Schedule>  LORazepam     Tablet 0.5 milliGRAM(s) Oral daily  losartan 50 milliGRAM(s) Oral two times a day  meclizine 50 milliGRAM(s) Oral every 8 hours  memantine 10 milliGRAM(s) Oral daily  pantoprazole    Tablet 40 milliGRAM(s) Oral before breakfast    MEDICATIONS  (PRN):  acetaminophen   Tablet .. 650 milliGRAM(s) Oral every 6 hours PRN Temp greater or equal to 38C (100.4F), Mild Pain (1 - 3)  aluminum hydroxide/magnesium hydroxide/simethicone Suspension 30 milliLiter(s) Oral every 6 hours PRN Dyspepsia  sodium chloride 0.65% Nasal 1 Spray(s) Both Nostrils three times a day PRN Nasal Congestion      __________________________________________________  REVIEW OF SYSTEMS:    CONSTITUTIONAL: No fever  EYES: No acute visual disturbances  NECK: No pain or stiffness  RESPIRATORY: No cough; No shortness of breath  CARDIOVASCULAR: No chest pain, no palpitations  GASTROINTESTINAL: Nausea, constant epigastric pain  NEUROLOGICAL: No headache or numbness, no tremors  MUSCULOSKELETAL: No joint pain, no muscle pain  GENITOURINARY: No dysuria, no frequency, no hesitancy    Vital Signs Last 24 Hrs  T(C): 36.5 (13 May 2019 16:18), Max: 37 (12 May 2019 23:58)  T(F): 97.7 (13 May 2019 16:18), Max: 98.6 (12 May 2019 23:58)  HR: 81 (13 May 2019 16:18) (65 - 81)  BP: 173/77 (13 May 2019 16:18) (153/54 - 179/77)  BP(mean): --  RR: 18 (13 May 2019 16:18) (17 - 18)  SpO2: 98% (13 May 2019 16:18) (98% - 100%)    ________________________________________________  PHYSICAL EXAM:    GENERAL: In mild distress 2/2 abdominal pain without tenderness  HEENT: Normocephalic;  conjunctivae and sclerae clear; moist mucous membranes;   NECK : supple  CHEST/LUNG: Clear to auscultation bilaterally with good air entry   HEART: S1 S2  regular; no murmurs, gallops or rubs  ABDOMEN: Soft, Nontender, Nondistended; Bowel sounds present  EXTREMITIES: No cyanosis; no edema; no calf tenderness  SKIN: Warm and dry; no rash  NERVOUS SYSTEM:  Awake and alert; no new deficits    _________________________________________________  LABS:                        12.9   8.88  )-----------( 267      ( 13 May 2019 06:32 )             40.0     05-13    143  |  108  |  15  ----------------------------<  108<H>  3.7   |  29  |  1.03    Ca    8.8      13 May 2019 06:32  Phos  3.0     05-13  Mg     2.6     05-13          CAPILLARY BLOOD GLUCOSE            RADIOLOGY & ADDITIONAL TESTS:    Imaging Personally Reviewed:  YES    Consultant(s) Notes Reviewed:   YES    Care Discussed with Consultants :     Plan of care was discussed with patient and /or primary care giver; all questions and concerns were addressed and care was aligned with patient's wishes.

## 2019-05-14 LAB
ANION GAP SERPL CALC-SCNC: 7 MMOL/L — SIGNIFICANT CHANGE UP (ref 5–17)
BUN SERPL-MCNC: 15 MG/DL — SIGNIFICANT CHANGE UP (ref 7–18)
CALCIUM SERPL-MCNC: 9 MG/DL — SIGNIFICANT CHANGE UP (ref 8.4–10.5)
CHLORIDE SERPL-SCNC: 108 MMOL/L — SIGNIFICANT CHANGE UP (ref 96–108)
CO2 SERPL-SCNC: 28 MMOL/L — SIGNIFICANT CHANGE UP (ref 22–31)
CREAT SERPL-MCNC: 0.82 MG/DL — SIGNIFICANT CHANGE UP (ref 0.5–1.3)
GLUCOSE SERPL-MCNC: 101 MG/DL — HIGH (ref 70–99)
HCT VFR BLD CALC: 40.1 % — SIGNIFICANT CHANGE UP (ref 34.5–45)
HGB BLD-MCNC: 12.9 G/DL — SIGNIFICANT CHANGE UP (ref 11.5–15.5)
MAGNESIUM SERPL-MCNC: 2.6 MG/DL — SIGNIFICANT CHANGE UP (ref 1.6–2.6)
MCHC RBC-ENTMCNC: 30.1 PG — SIGNIFICANT CHANGE UP (ref 27–34)
MCHC RBC-ENTMCNC: 32.2 GM/DL — SIGNIFICANT CHANGE UP (ref 32–36)
MCV RBC AUTO: 93.5 FL — SIGNIFICANT CHANGE UP (ref 80–100)
NRBC # BLD: 0 /100 WBCS — SIGNIFICANT CHANGE UP (ref 0–0)
PHOSPHATE SERPL-MCNC: 2.8 MG/DL — SIGNIFICANT CHANGE UP (ref 2.5–4.5)
PLATELET # BLD AUTO: 279 K/UL — SIGNIFICANT CHANGE UP (ref 150–400)
POTASSIUM SERPL-MCNC: 3.6 MMOL/L — SIGNIFICANT CHANGE UP (ref 3.5–5.3)
POTASSIUM SERPL-SCNC: 3.6 MMOL/L — SIGNIFICANT CHANGE UP (ref 3.5–5.3)
RBC # BLD: 4.29 M/UL — SIGNIFICANT CHANGE UP (ref 3.8–5.2)
RBC # FLD: 12.6 % — SIGNIFICANT CHANGE UP (ref 10.3–14.5)
SODIUM SERPL-SCNC: 143 MMOL/L — SIGNIFICANT CHANGE UP (ref 135–145)
WBC # BLD: 9.2 K/UL — SIGNIFICANT CHANGE UP (ref 3.8–10.5)
WBC # FLD AUTO: 9.2 K/UL — SIGNIFICANT CHANGE UP (ref 3.8–10.5)

## 2019-05-14 PROCEDURE — 76700 US EXAM ABDOM COMPLETE: CPT | Mod: 26

## 2019-05-14 RX ORDER — LOSARTAN POTASSIUM 100 MG/1
50 TABLET, FILM COATED ORAL
Refills: 0 | Status: DISCONTINUED | OUTPATIENT
Start: 2019-05-14 | End: 2019-05-19

## 2019-05-14 RX ORDER — LOSARTAN POTASSIUM 100 MG/1
50 TABLET, FILM COATED ORAL ONCE
Refills: 0 | Status: DISCONTINUED | OUTPATIENT
Start: 2019-05-14 | End: 2019-05-14

## 2019-05-14 RX ADMIN — Medication 1000 UNIT(S): at 12:02

## 2019-05-14 RX ADMIN — Medication 0.5 MILLIGRAM(S): at 12:02

## 2019-05-14 RX ADMIN — LOSARTAN POTASSIUM 50 MILLIGRAM(S): 100 TABLET, FILM COATED ORAL at 05:16

## 2019-05-14 RX ADMIN — PANTOPRAZOLE SODIUM 40 MILLIGRAM(S): 20 TABLET, DELAYED RELEASE ORAL at 05:16

## 2019-05-14 RX ADMIN — LOSARTAN POTASSIUM 50 MILLIGRAM(S): 100 TABLET, FILM COATED ORAL at 17:20

## 2019-05-14 RX ADMIN — CEFTRIAXONE 100 GRAM(S): 500 INJECTION, POWDER, FOR SOLUTION INTRAMUSCULAR; INTRAVENOUS at 12:02

## 2019-05-14 RX ADMIN — ATORVASTATIN CALCIUM 80 MILLIGRAM(S): 80 TABLET, FILM COATED ORAL at 22:03

## 2019-05-14 RX ADMIN — Medication 50 MILLIGRAM(S): at 05:16

## 2019-05-14 RX ADMIN — MEMANTINE HYDROCHLORIDE 10 MILLIGRAM(S): 10 TABLET ORAL at 12:02

## 2019-05-14 RX ADMIN — ENOXAPARIN SODIUM 40 MILLIGRAM(S): 100 INJECTION SUBCUTANEOUS at 12:02

## 2019-05-14 RX ADMIN — Medication 81 MILLIGRAM(S): at 12:02

## 2019-05-14 RX ADMIN — Medication 50 MILLIGRAM(S): at 21:56

## 2019-05-14 RX ADMIN — Medication 50 MILLIGRAM(S): at 14:18

## 2019-05-14 RX ADMIN — DONEPEZIL HYDROCHLORIDE 10 MILLIGRAM(S): 10 TABLET, FILM COATED ORAL at 21:55

## 2019-05-14 NOTE — PROGRESS NOTE ADULT - ASSESSMENT
84 yr old Female form home lives with daughter independent at baseline with PMHX of dementia, HTN, HLD presented with dizziness and upper abdominal pain,bradycardia.   1.PT.  2.Lipid d/o-statin.  3.Abdominal pain-GI eval.  4.HTN-Inc cozaar 50mg bid  5.Prediabestes-diet.  6.GI and DVT prophylaxis.

## 2019-05-14 NOTE — PROGRESS NOTE ADULT - SUBJECTIVE AND OBJECTIVE BOX
CHIEF COMPLAINT:Patient is a 84y old  Female who presents with a chief complaint of dizziness and upper abdominal pain.Pt appears comfortable.    	  REVIEW OF SYSTEMS:  CONSTITUTIONAL: No fever, weight loss, or fatigue  EYES: No eye pain, visual disturbances, or discharge  ENT:  No difficulty hearing, tinnitus, vertigo; No sinus or throat pain  NECK: No pain or stiffness  RESPIRATORY: No cough, wheezing, chills or hemoptysis; No Shortness of Breath  CARDIOVASCULAR: No chest pain, palpitations, passing out, dizziness, or leg swelling  GASTROINTESTINAL: No abdominal or epigastric pain. No nausea, vomiting, or hematemesis; No diarrhea or constipation. No melena or hematochezia.  GENITOURINARY: No dysuria, frequency, hematuria, or incontinence  NEUROLOGICAL: No headaches, memory loss, loss of strength, numbness, or tremors  SKIN: No itching, burning, rashes, or lesions   LYMPH Nodes: No enlarged glands  ENDOCRINE: No heat or cold intolerance; No hair loss  MUSCULOSKELETAL: No joint pain or swelling; No muscle, back, or extremity pain  PSYCHIATRIC: No depression, anxiety, mood swings, or difficulty sleeping  HEME/LYMPH: No easy bruising, or bleeding gums  ALLERGY AND IMMUNOLOGIC: No hives or eczema	      PHYSICAL EXAM:  T(C): 36.6 (05-14-19 @ 07:16), Max: 36.9 (05-13-19 @ 23:32)  HR: 73 (05-14-19 @ 07:16) (73 - 94)  BP: 193/74 (05-14-19 @ 07:16) (140/64 - 193/74)  RR: 19 (05-14-19 @ 07:16) (18 - 19)  SpO2: 96% (05-14-19 @ 07:16) (96% - 100%)      Appearance: Normal	  HEENT:   Normal oral mucosa, PERRL, EOMI	  Lymphatic: No lymphadenopathy  Cardiovascular: Normal S1 S2, No JVD, No murmurs, No edema  Respiratory: Lungs clear to auscultation	  Psychiatry: A & O x 3, Mood & affect appropriate  Gastrointestinal:  Soft, Non-tender, + BS	  Skin: No rashes, No ecchymoses, No cyanosis	  Neurologic: Non-focal  Extremities: Normal range of motion, No clubbing, cyanosis or edema  Vascular: Peripheral pulses palpable 2+ bilaterally    MEDICATIONS  (STANDING):  aspirin enteric coated 81 milliGRAM(s) Oral daily  atorvastatin 80 milliGRAM(s) Oral at bedtime  cefTRIAXone   IVPB 1 Gram(s) IV Intermittent every 24 hours  cefTRIAXone   IVPB      cholecalciferol 1000 Unit(s) Oral daily  donepezil 10 milliGRAM(s) Oral at bedtime  enoxaparin Injectable 40 milliGRAM(s) SubCutaneous daily  ergocalciferol 72029 Unit(s) Oral <User Schedule>  LORazepam     Tablet 0.5 milliGRAM(s) Oral daily  losartan 50 milliGRAM(s) Oral once  meclizine 50 milliGRAM(s) Oral every 8 hours  memantine 10 milliGRAM(s) Oral daily  pantoprazole    Tablet 40 milliGRAM(s) Oral before breakfast      	  LABS:	 	                       12.9   9.20  )-----------( 279      ( 14 May 2019 07:19 )             40.1     05-14    143  |  108  |  15  ----------------------------<  101<H>  3.6   |  28  |  0.82    Ca    9.0      14 May 2019 07:19  Phos  2.8     05-14  Mg     2.6     05-14        Lipid Profile: Cholesterol 198    HDL 71      HgA1c: Hemoglobin A1C, Whole Blood: 5.8 % (05-10 @ 09:22)    TSH: Thyroid Stimulating Hormone, Serum: 1.07 uU/mL (05-10 @ 06:04)    OBSERVATIONS:  Mitral Valve: Mitral annular calcification, and calcified  mitral leaflets with normal diastolic opening. Mild to  moderate mitral regurgitation.  Aortic Root: Normal aortic root.  Aortic Valve: Aortic valve leaflet morphology not well  visualized, appears calcified with normal opening. Mild  aortic regurgitation.  Left Atrium: Normal left atrium.  LA volume index = 31  cc/m2.  Left Ventricle: Normal left ventricular systolic function.  Mild diastolic dysfunction (stage I). Normal left  ventricular internal dimensions and wall thicknesses.  Right Heart: Normal right atrium. Normal right ventricular  size and function. There is mild tricuspid regurgitation.  There is trace pulmonic regurgitation.  Pericardium/PleuraNormal pericardium with no pericardial  effusion.  Hemodynamic: RA Pressure is 8 mm Hg. RV systolic pressure  is 37 mm Hg.    	    IMPRESSIONS:Normal Study  * Negative ECG evidence of ischemia after IV of Lexiscan.  * Review of raw data shows: The study isof good technical  quality.  * The left ventricle was normal in size. Normal myocardial  perfusion scan, with no evidence of infarction or  inducible ischemia.  * Post-stress resting myocardial perfusion gated SPECT  imaging was performed (LVEF > 70%)    ------------------------------------------------------------------------  ------------------------------------------------------------------------    Confirmed on  5/13/2019 - 14:26:06 at [place] by  Lisa Nesbitt MD  ------------------------------------------------------------------------      EXAM:  US ABDOMEN COMPLETE                            PROCEDURE DATE:  05/14/2019          INTERPRETATION:  Abdominal ultrasound    Indication: Right upper quadrant abdominal pain    Abdominal ultrasound is performed without a previous examination for   comparison. The liver spans 12.9cm which is within normal limits in size.   The liver is diffusely fatty without evidence for a focal lesion. Duplex   Doppler interrogation of the main portal vein demonstrates normal   hepatopetal flow. There isno intra or extrahepatic biliary ductal   dilatation. The common bile duct measures 6.2 mm in caliber which is   within normal limits for age. The gallbladder is not visualized, likely   surgically absent. The visualized pancreas maintains normal echogenicity   and morphology. The spleen measures 8.5 cm in length which is within   normal limits. No ascites.    The right kidney measures 9.6 cm in length and the left kidney measures   9.6 cm in length. No hydronephrosis. Bilateral renal cysts measuring up   to 3.7 cm on the left.     The IVC and aorta appear grossly normal.    Impression: Hepatic steatosis.    Bilateral renal cysts.

## 2019-05-14 NOTE — DIETITIAN INITIAL EVALUATION ADULT. - PERTINENT MEDS FT
reviewed   MEDICATIONS  (STANDING):  aspirin enteric coated 81 milliGRAM(s) Oral daily  atorvastatin 80 milliGRAM(s) Oral at bedtime  cefTRIAXone   IVPB 1 Gram(s) IV Intermittent every 24 hours  cefTRIAXone   IVPB      cholecalciferol 1000 Unit(s) Oral daily  donepezil 10 milliGRAM(s) Oral at bedtime  enoxaparin Injectable 40 milliGRAM(s) SubCutaneous daily  ergocalciferol 02685 Unit(s) Oral <User Schedule>  LORazepam     Tablet 0.5 milliGRAM(s) Oral daily  losartan 50 milliGRAM(s) Oral once  meclizine 50 milliGRAM(s) Oral every 8 hours  memantine 10 milliGRAM(s) Oral daily  pantoprazole    Tablet 40 milliGRAM(s) Oral before breakfast    MEDICATIONS  (PRN):  acetaminophen   Tablet .. 650 milliGRAM(s) Oral every 6 hours PRN Temp greater or equal to 38C (100.4F), Mild Pain (1 - 3)  aluminum hydroxide/magnesium hydroxide/simethicone Suspension 30 milliLiter(s) Oral every 6 hours PRN Dyspepsia  sodium chloride 0.65% Nasal 1 Spray(s) Both Nostrils three times a day PRN Nasal Congestion

## 2019-05-14 NOTE — DIETITIAN INITIAL EVALUATION ADULT. - OTHER INFO
nutrition assessment for clear liquid diet x 4 to 5d; lives home PTA; skin intact; no GI distress, chewing or swallowing problem at present, tolerating 50 to 75% of clear liquid per flow sheet; Limited intake/wt change history data available at present nutrition assessment for clear liquid diet x 4 to 5d; lives home PTA; skin intact; no GI distress, chewing or swallowing problem at present, tolerating 50 to 75% of clear liquid per flow sheet; Limited intake/wt change history data available at present.

## 2019-05-14 NOTE — DIETITIAN INITIAL EVALUATION ADULT. - PERTINENT LABORATORY DATA
reviewed    05-14 Na143 mmol/L Glu 101 mg/dL<H> K+ 3.6 mmol/L Cr  0.82 mg/dL BUN 15 mg/dL  IkcU5M=5.8

## 2019-05-14 NOTE — PROGRESS NOTE ADULT - SUBJECTIVE AND OBJECTIVE BOX
Patient is a 84y old  Female who presents with a chief complaint of dizziness and upper abdominal pain (14 May 2019 10:41)    pt seen in icu [  ], reg med floor [   ], bed [  ], chair at bedside [   ], a+o x3 [  ], lethargic [  ],  nad [  ]    robison [  ], ngt [  ], peg [  ], et tube [  ], cent line [  ], picc line [  ]        Allergies    No Known Allergies        Vitals    T(F): 97.9 (05-14-19 @ 07:16), Max: 98.4 (05-13-19 @ 23:32)  HR: 73 (05-14-19 @ 07:16) (73 - 94)  BP: 193/74 (05-14-19 @ 07:16) (140/64 - 193/74)  RR: 19 (05-14-19 @ 07:16) (18 - 19)  SpO2: 96% (05-14-19 @ 07:16) (96% - 100%)  Wt(kg): --  CAPILLARY BLOOD GLUCOSE          Labs                          12.9   9.20  )-----------( 279      ( 14 May 2019 07:19 )             40.1       05-14    143  |  108  |  15  ----------------------------<  101<H>  3.6   |  28  |  0.82    Ca    9.0      14 May 2019 07:19  Phos  2.8     05-14  Mg     2.6     05-14              .Blood  05-10 @ 19:03   No growth to date.  --  --      .Urine  05-10 @ 00:46   <10,000 CFU/mL Normal Urogenital Laura  --  --          Radiology Results      Meds    MEDICATIONS  (STANDING):  aspirin enteric coated 81 milliGRAM(s) Oral daily  atorvastatin 80 milliGRAM(s) Oral at bedtime  cefTRIAXone   IVPB 1 Gram(s) IV Intermittent every 24 hours  cefTRIAXone   IVPB      cholecalciferol 1000 Unit(s) Oral daily  donepezil 10 milliGRAM(s) Oral at bedtime  enoxaparin Injectable 40 milliGRAM(s) SubCutaneous daily  ergocalciferol 25701 Unit(s) Oral <User Schedule>  LORazepam     Tablet 0.5 milliGRAM(s) Oral daily  losartan 50 milliGRAM(s) Oral two times a day  meclizine 50 milliGRAM(s) Oral every 8 hours  memantine 10 milliGRAM(s) Oral daily  pantoprazole    Tablet 40 milliGRAM(s) Oral before breakfast      MEDICATIONS  (PRN):  acetaminophen   Tablet .. 650 milliGRAM(s) Oral every 6 hours PRN Temp greater or equal to 38C (100.4F), Mild Pain (1 - 3)  aluminum hydroxide/magnesium hydroxide/simethicone Suspension 30 milliLiter(s) Oral every 6 hours PRN Dyspepsia  sodium chloride 0.65% Nasal 1 Spray(s) Both Nostrils three times a day PRN Nasal Congestion      Physical Exam    Neuro :  no focal deficits  Respiratory: CTA B/L  CV: RRR, S1S2, no murmurs,   Abdominal: Soft, NT, ND +BS,  Extremities: No edema, + peripheral pulses    ASSESSMENT    Dizziness and giddiness  HTN (hypertension)  History of cholecystectomy      PLAN Patient is a 84y old  Female who presents with a chief complaint of dizziness and upper abdominal pain (14 May 2019 10:41)      pt seen in tele [ x ], reg med floor [   ], bed [ ], chair at bedside [ x  ], a+o x3 [  ], lethargic [  ],  nad [ x ]    still with dizziness, also with complaints of ruq pain      Allergies    No Known Allergies        Vitals    T(F): 97.9 (05-14-19 @ 07:16), Max: 98.4 (05-13-19 @ 23:32)  HR: 73 (05-14-19 @ 07:16) (73 - 94)  BP: 193/74 (05-14-19 @ 07:16) (140/64 - 193/74)  RR: 19 (05-14-19 @ 07:16) (18 - 19)  SpO2: 96% (05-14-19 @ 07:16) (96% - 100%)  Wt(kg): --  CAPILLARY BLOOD GLUCOSE          Labs                          12.9   9.20  )-----------( 279      ( 14 May 2019 07:19 )             40.1       05-14    143  |  108  |  15  ----------------------------<  101<H>  3.6   |  28  |  0.82    Ca    9.0      14 May 2019 07:19  Phos  2.8     05-14  Mg     2.6     05-14              .Blood  05-10 @ 19:03   No growth to date.  --  --      .Urine  05-10 @ 00:46   <10,000 CFU/mL Normal Urogenital Laura  --  --          Radiology Results    < from: US Abdomen Complete (05.14.19 @ 09:44) >    Impression: Hepatic steatosis.    Bilateral renal cysts.    < end of copied text >      Meds    MEDICATIONS  (STANDING):  aspirin enteric coated 81 milliGRAM(s) Oral daily  atorvastatin 80 milliGRAM(s) Oral at bedtime  cefTRIAXone   IVPB 1 Gram(s) IV Intermittent every 24 hours  cefTRIAXone   IVPB      cholecalciferol 1000 Unit(s) Oral daily  donepezil 10 milliGRAM(s) Oral at bedtime  enoxaparin Injectable 40 milliGRAM(s) SubCutaneous daily  ergocalciferol 96689 Unit(s) Oral <User Schedule>  LORazepam     Tablet 0.5 milliGRAM(s) Oral daily  losartan 50 milliGRAM(s) Oral two times a day  meclizine 50 milliGRAM(s) Oral every 8 hours  memantine 10 milliGRAM(s) Oral daily  pantoprazole    Tablet 40 milliGRAM(s) Oral before breakfast      MEDICATIONS  (PRN):  acetaminophen   Tablet .. 650 milliGRAM(s) Oral every 6 hours PRN Temp greater or equal to 38C (100.4F), Mild Pain (1 - 3)  aluminum hydroxide/magnesium hydroxide/simethicone Suspension 30 milliLiter(s) Oral every 6 hours PRN Dyspepsia  sodium chloride 0.65% Nasal 1 Spray(s) Both Nostrils three times a day PRN Nasal Congestion        Physical Exam    Neuro :  no focal deficits except b/l nystagmus   Respiratory: CTA B/L  CV: RRR, S1S2, no murmurs,   Abdominal: Soft, NT, ND +BS,  Extremities: No edema, + peripheral pulses      ASSESSMENT    uti  Dizziness and giddiness  abd pain   vertigo   renal lesion   h/o HTN  cholecystectomy      PLAN    cont meclizine 50mg tid  ativan  outpt vestibular therapy   abd us noted   cont statin, asa, meclizine   cont tele   ua +ve   ceftriaxone   ct head noted   ct abd noted   f/u blood cx   urine cx wnl  echo noted EF > 55%   carotid doppler noted -ve  renal us noted : left renal cyst   cardio f/u  orthostatic bp q shift   stress test r/o ischemia   neuro f/u  MRI brain noted wnl   PT cons   continue current meds

## 2019-05-14 NOTE — PROGRESS NOTE ADULT - PROBLEM SELECTOR PLAN 1
Presented with room spinning sensation, worsening with head movement and improvement with rest and closing eyes. Nystagmus negative and orthostatic negative on lying down and sitting. DDx Central vs veritgo. CT head: Mild chronic microvascular changes without evidence of an acute transcortical infarction or hemorrhage. Cardiac enzymes non-significant. EKG - NSR no stt wave changes. CD showed no hemodynamically significant internal carotid artery stenosis by velocity criteria. Irregular arterial pulses are noted.  -Lactate normalized to 1.6  -C/w aspirin, atorvastatin, and meclizine  -C/w tele monitoring and neruo checks   -UCx negative  -BCx negative to final report  -Orthostatic pressure was positive 5/13 AM  -MR brain neg  -US renal only showed L renal showed without acute pathology  -Dizziness likely 2/2 peripheral vertigo. Meclizine dosage increased to 50 mg once a day with persistnet and significant dizziness.  -Meclizine will be switched to PRN basis when dizziness improves  -TTE and Stress NL  -Pt to f/u with OP vestibular rehab  -F/u PT  -Pt now on standing low dose Ativan  -Dizziness has improved on trial of Ativan  -If dizziness continues to improve, will be switched to PRN basis  -PT remains to be completed

## 2019-05-14 NOTE — PROGRESS NOTE ADULT - SUBJECTIVE AND OBJECTIVE BOX
PGY 1 Note discussed with supervising resident and primary attending    Patient is a 84y old  Female who presents with a chief complaint of dizziness and upper abdominal pain (14 May 2019 12:11)      INTERVAL HPI/OVERNIGHT EVENTS: No acute events overnight, remains afebrile; HD stable, H/H stable, WBC WNL  -Pt reports no new medical problems  -SBP in 190s this AM. Losartan increased to 50 mg BID per Cardiology. New agent to be added tomorrow if BP still remains uncontrolled  Dizziness has improved on trial of Ativan  -Pt now on standing low dose Ativan  -If dizziness continues to improve, will be switched to PRN basis  -US abdomen performed today only showed hepatic steatosis and bilateral renal cysts  -PT remains to be completed    MEDICATIONS  (STANDING):  aspirin enteric coated 81 milliGRAM(s) Oral daily  atorvastatin 80 milliGRAM(s) Oral at bedtime  cefTRIAXone   IVPB 1 Gram(s) IV Intermittent every 24 hours  cefTRIAXone   IVPB      cholecalciferol 1000 Unit(s) Oral daily  donepezil 10 milliGRAM(s) Oral at bedtime  enoxaparin Injectable 40 milliGRAM(s) SubCutaneous daily  ergocalciferol 21129 Unit(s) Oral <User Schedule>  LORazepam     Tablet 0.5 milliGRAM(s) Oral daily  losartan 50 milliGRAM(s) Oral two times a day  meclizine 50 milliGRAM(s) Oral every 8 hours  memantine 10 milliGRAM(s) Oral daily  pantoprazole    Tablet 40 milliGRAM(s) Oral before breakfast    MEDICATIONS  (PRN):  acetaminophen   Tablet .. 650 milliGRAM(s) Oral every 6 hours PRN Temp greater or equal to 38C (100.4F), Mild Pain (1 - 3)  aluminum hydroxide/magnesium hydroxide/simethicone Suspension 30 milliLiter(s) Oral every 6 hours PRN Dyspepsia  sodium chloride 0.65% Nasal 1 Spray(s) Both Nostrils three times a day PRN Nasal Congestion      __________________________________________________  REVIEW OF SYSTEMS:    CONSTITUTIONAL: No fever  EYES: No acute visual disturbances  NECK: No pain or stiffness  RESPIRATORY: No cough; No shortness of breath  CARDIOVASCULAR: No chest pain, no palpitations  GASTROINTESTINAL: Nausea, constant epigastric pain  NEUROLOGICAL: No headache or numbness, no tremors  MUSCULOSKELETAL: No joint pain, no muscle pain  GENITOURINARY: No dysuria, no frequency, no hesitancy        Vital Signs Last 24 Hrs  T(C): 36.6 (14 May 2019 11:26), Max: 36.9 (13 May 2019 23:32)  T(F): 97.8 (14 May 2019 11:26), Max: 98.4 (13 May 2019 23:32)  HR: 86 (14 May 2019 11:26) (73 - 94)  BP: 190/77 (14 May 2019 11:26) (140/64 - 193/74)  BP(mean): --  RR: 18 (14 May 2019 11:26) (18 - 19)  SpO2: 100% (14 May 2019 11:26) (96% - 100%)    ________________________________________________  PHYSICAL EXAM:    GENERAL: In mild distress 2/2 abdominal pain without tenderness  HEENT: Normocephalic;  conjunctivae and sclerae clear; moist mucous membranes;   NECK : supple  CHEST/LUNG: Clear to auscultation bilaterally with good air entry   HEART: S1 S2  regular; no murmurs, gallops or rubs  ABDOMEN: Soft, Nontender, Nondistended; Bowel sounds present  EXTREMITIES: No cyanosis; no edema; no calf tenderness  SKIN: Warm and dry; no rash  NERVOUS SYSTEM:  Awake and alert; no new deficits    _________________________________________________  LABS:                        12.9   9.20  )-----------( 279      ( 14 May 2019 07:19 )             40.1     05-14    143  |  108  |  15  ----------------------------<  101<H>  3.6   |  28  |  0.82    Ca    9.0      14 May 2019 07:19  Phos  2.8     05-14  Mg     2.6     05-14          CAPILLARY BLOOD GLUCOSE            RADIOLOGY & ADDITIONAL TESTS:    Imaging Personally Reviewed:  YES    Consultant(s) Notes Reviewed:   YES    Care Discussed with Consultants :     Plan of care was discussed with patient and /or primary care giver; all questions and concerns were addressed and care was aligned with patient's wishes.

## 2019-05-14 NOTE — DIETITIAN INITIAL EVALUATION ADULT. - NS AS NUTRI INTERV FEED ASSISTANCE
Feeding Assistance/as needed; Provide food choices within diet Rx as available/updated when diet advanced/Meal set -up

## 2019-05-15 ENCOUNTER — TRANSCRIPTION ENCOUNTER (OUTPATIENT)
Age: 84
End: 2019-05-15

## 2019-05-15 LAB
ANION GAP SERPL CALC-SCNC: 5 MMOL/L — SIGNIFICANT CHANGE UP (ref 5–17)
BUN SERPL-MCNC: 15 MG/DL — SIGNIFICANT CHANGE UP (ref 7–18)
CALCIUM SERPL-MCNC: 9 MG/DL — SIGNIFICANT CHANGE UP (ref 8.4–10.5)
CHLORIDE SERPL-SCNC: 107 MMOL/L — SIGNIFICANT CHANGE UP (ref 96–108)
CO2 SERPL-SCNC: 27 MMOL/L — SIGNIFICANT CHANGE UP (ref 22–31)
CREAT SERPL-MCNC: 0.76 MG/DL — SIGNIFICANT CHANGE UP (ref 0.5–1.3)
CULTURE RESULTS: SIGNIFICANT CHANGE UP
CULTURE RESULTS: SIGNIFICANT CHANGE UP
GLUCOSE SERPL-MCNC: 102 MG/DL — HIGH (ref 70–99)
HCT VFR BLD CALC: 40.5 % — SIGNIFICANT CHANGE UP (ref 34.5–45)
HGB BLD-MCNC: 13.2 G/DL — SIGNIFICANT CHANGE UP (ref 11.5–15.5)
MAGNESIUM SERPL-MCNC: 2.5 MG/DL — SIGNIFICANT CHANGE UP (ref 1.6–2.6)
MCHC RBC-ENTMCNC: 29.9 PG — SIGNIFICANT CHANGE UP (ref 27–34)
MCHC RBC-ENTMCNC: 32.6 GM/DL — SIGNIFICANT CHANGE UP (ref 32–36)
MCV RBC AUTO: 91.8 FL — SIGNIFICANT CHANGE UP (ref 80–100)
NRBC # BLD: 0 /100 WBCS — SIGNIFICANT CHANGE UP (ref 0–0)
PHOSPHATE SERPL-MCNC: 2.9 MG/DL — SIGNIFICANT CHANGE UP (ref 2.5–4.5)
PLATELET # BLD AUTO: 294 K/UL — SIGNIFICANT CHANGE UP (ref 150–400)
POTASSIUM SERPL-MCNC: 3.7 MMOL/L — SIGNIFICANT CHANGE UP (ref 3.5–5.3)
POTASSIUM SERPL-SCNC: 3.7 MMOL/L — SIGNIFICANT CHANGE UP (ref 3.5–5.3)
RBC # BLD: 4.41 M/UL — SIGNIFICANT CHANGE UP (ref 3.8–5.2)
RBC # FLD: 12.4 % — SIGNIFICANT CHANGE UP (ref 10.3–14.5)
SODIUM SERPL-SCNC: 139 MMOL/L — SIGNIFICANT CHANGE UP (ref 135–145)
SPECIMEN SOURCE: SIGNIFICANT CHANGE UP
SPECIMEN SOURCE: SIGNIFICANT CHANGE UP
WBC # BLD: 10.03 K/UL — SIGNIFICANT CHANGE UP (ref 3.8–10.5)
WBC # FLD AUTO: 10.03 K/UL — SIGNIFICANT CHANGE UP (ref 3.8–10.5)

## 2019-05-15 PROCEDURE — 99233 SBSQ HOSP IP/OBS HIGH 50: CPT

## 2019-05-15 RX ORDER — MECLIZINE HCL 12.5 MG
50 TABLET ORAL EVERY 6 HOURS
Refills: 0 | Status: DISCONTINUED | OUTPATIENT
Start: 2019-05-15 | End: 2019-05-16

## 2019-05-15 RX ORDER — SIMETHICONE 80 MG/1
80 TABLET, CHEWABLE ORAL
Refills: 0 | Status: DISCONTINUED | OUTPATIENT
Start: 2019-05-15 | End: 2019-05-19

## 2019-05-15 RX ORDER — AMLODIPINE BESYLATE 2.5 MG/1
5 TABLET ORAL ONCE
Refills: 0 | Status: COMPLETED | OUTPATIENT
Start: 2019-05-15 | End: 2019-05-15

## 2019-05-15 RX ADMIN — AMLODIPINE BESYLATE 5 MILLIGRAM(S): 2.5 TABLET ORAL at 23:42

## 2019-05-15 RX ADMIN — LOSARTAN POTASSIUM 50 MILLIGRAM(S): 100 TABLET, FILM COATED ORAL at 05:28

## 2019-05-15 RX ADMIN — Medication 1000 UNIT(S): at 12:01

## 2019-05-15 RX ADMIN — MEMANTINE HYDROCHLORIDE 10 MILLIGRAM(S): 10 TABLET ORAL at 12:01

## 2019-05-15 RX ADMIN — Medication 50 MILLIGRAM(S): at 14:05

## 2019-05-15 RX ADMIN — LOSARTAN POTASSIUM 50 MILLIGRAM(S): 100 TABLET, FILM COATED ORAL at 17:28

## 2019-05-15 RX ADMIN — ATORVASTATIN CALCIUM 80 MILLIGRAM(S): 80 TABLET, FILM COATED ORAL at 21:37

## 2019-05-15 RX ADMIN — Medication 81 MILLIGRAM(S): at 12:01

## 2019-05-15 RX ADMIN — DONEPEZIL HYDROCHLORIDE 10 MILLIGRAM(S): 10 TABLET, FILM COATED ORAL at 21:38

## 2019-05-15 RX ADMIN — Medication 50 MILLIGRAM(S): at 20:10

## 2019-05-15 RX ADMIN — CEFTRIAXONE 100 GRAM(S): 500 INJECTION, POWDER, FOR SOLUTION INTRAMUSCULAR; INTRAVENOUS at 12:01

## 2019-05-15 RX ADMIN — Medication 50 MILLIGRAM(S): at 05:28

## 2019-05-15 RX ADMIN — ENOXAPARIN SODIUM 40 MILLIGRAM(S): 100 INJECTION SUBCUTANEOUS at 12:01

## 2019-05-15 RX ADMIN — Medication 0.5 MILLIGRAM(S): at 12:01

## 2019-05-15 RX ADMIN — Medication 0.5 MILLIGRAM(S): at 20:10

## 2019-05-15 RX ADMIN — PANTOPRAZOLE SODIUM 40 MILLIGRAM(S): 20 TABLET, DELAYED RELEASE ORAL at 06:13

## 2019-05-15 NOTE — CONSULT NOTE ADULT - ASSESSMENT
84 yr old Female form home lives with daughter independent at baseline with PMHX of dementia, HTN, HLD presented with dizziness and upper abdominal pain,bradycardia.   1.Tele monitoring.  2.Echocradiogram.  3.Neurology eval, off bp medication for now.  4.Orthostatic BP q shift.  5.Bradycardia due to atenolol.  6.Lipid d/o-statin.  7.Abdominal pain-GI eval.  8.GI and DVT prophylaxis.
Vertigo, patient unreliable, concerning for posterior circulation stroke vs. peripheral vertigo    Recommend:  Meclazine 25mg [id number], switch to prn if symptoms resolved, increase to 50mg tid if symptoms persists  MRI brain to r/o cva, CTH limited study for posterior circulation strokes
1. The etiology for abdominal pain is not clear but can be due to:  1. Peptic ulcer disease  2. Esophagitis  3. Pancreatitis unlikely  4. Biliary colic less likely    Suggestions:    1. Protonix daily  2. Anti reflux measure  3. EGD out patient   4. Avoid NSAID  5. DVT prophylaxis

## 2019-05-15 NOTE — CONSULT NOTE ADULT - GASTROINTESTINAL DETAILS
no rebound tenderness/no rigidity/no guarding/nontender/soft/bowel sounds normal/no distention/no organomegaly

## 2019-05-15 NOTE — DISCHARGE NOTE PROVIDER - CARE PROVIDER_API CALL
Vasiliy Teixeira)  Otolaryngology  [address] [place] [address]  Phone: [phone]  Fax: [phone]  Follow Up Time:

## 2019-05-15 NOTE — PROGRESS NOTE ADULT - ASSESSMENT
84 yr old Female form home lives with daughter independent at baseline with PMHX of dementia, HTN, HLD presented with dizziness and upper abdominal pain,bradycardia.   1.PT.  2.Lipid d/o-statin.  3.Abdominal pain-GI eval.  4.HTN-Cozaar 50mg bid  5.Prediabestes-diet.  6.GI and DVT prophylaxis.

## 2019-05-15 NOTE — PROGRESS NOTE ADULT - SUBJECTIVE AND OBJECTIVE BOX
PGY 1 Note discussed with supervising resident and primary attending    Patient is a 84y old  Female who presents with a chief complaint of dizziness and upper abdominal pain (15 May 2019 13:43)      INTERVAL HPI/OVERNIGHT EVENTS: No acute events overnight, remains afebrile; HD stable, H/H stable, WBC WNL  -Pt reports no new medical problems  -Pt c/o persistent significant dizziness again this AM  -Neuro Dr. Marcano asked for a follow up  -BP better controlled today on adjusted BP regimen  -PT to complete their DC recommendation  -Pt started on Simethicone given negative US abdomen for abdominal pain    MEDICATIONS  (STANDING):  aspirin enteric coated 81 milliGRAM(s) Oral daily  atorvastatin 80 milliGRAM(s) Oral at bedtime  cefTRIAXone   IVPB 1 Gram(s) IV Intermittent every 24 hours  cefTRIAXone   IVPB      cholecalciferol 1000 Unit(s) Oral daily  donepezil 10 milliGRAM(s) Oral at bedtime  enoxaparin Injectable 40 milliGRAM(s) SubCutaneous daily  ergocalciferol 96054 Unit(s) Oral <User Schedule>  LORazepam     Tablet 0.5 milliGRAM(s) Oral daily  losartan 50 milliGRAM(s) Oral two times a day  meclizine 50 milliGRAM(s) Oral every 8 hours  memantine 10 milliGRAM(s) Oral daily  pantoprazole    Tablet 40 milliGRAM(s) Oral before breakfast    MEDICATIONS  (PRN):  acetaminophen   Tablet .. 650 milliGRAM(s) Oral every 6 hours PRN Temp greater or equal to 38C (100.4F), Mild Pain (1 - 3)  aluminum hydroxide/magnesium hydroxide/simethicone Suspension 30 milliLiter(s) Oral every 6 hours PRN Dyspepsia  simethicone 80 milliGRAM(s) Chew four times a day PRN Gas  sodium chloride 0.65% Nasal 1 Spray(s) Both Nostrils three times a day PRN Nasal Congestion      __________________________________________________  REVIEW OF SYSTEMS:    CONSTITUTIONAL: No fever  EYES: No acute visual disturbances  NECK: No pain or stiffness  RESPIRATORY: No cough; No shortness of breath  CARDIOVASCULAR: No chest pain, no palpitations  GASTROINTESTINAL: Nausea, constant epigastric pain  NEUROLOGICAL: No headache or numbness, no tremors  MUSCULOSKELETAL: No joint pain, no muscle pain  GENITOURINARY: No dysuria, no frequency, no hesitancy      Vital Signs Last 24 Hrs  T(C): 36.6 (15 May 2019 11:31), Max: 36.9 (15 May 2019 07:21)  T(F): 97.9 (15 May 2019 11:31), Max: 98.4 (15 May 2019 07:21)  HR: 86 (15 May 2019 11:31) (71 - 100)  BP: 144/67 (15 May 2019 11:31) (142/75 - 179/75)  BP(mean): --  RR: 18 (15 May 2019 11:31) (17 - 18)  SpO2: 97% (15 May 2019 11:31) (94% - 100%)    ________________________________________________  PHYSICAL EXAM:    GENERAL: In mild distress 2/2 abdominal pain without tenderness  HEENT: Normocephalic;  conjunctivae and sclerae clear; moist mucous membranes;   NECK : supple  CHEST/LUNG: Clear to auscultation bilaterally with good air entry   HEART: S1 S2  regular; no murmurs, gallops or rubs  ABDOMEN: Soft, Nontender, Nondistended; Bowel sounds present  EXTREMITIES: No cyanosis; no edema; no calf tenderness  SKIN: Warm and dry; no rash  NERVOUS SYSTEM:  Awake and alert; no new deficits    _________________________________________________  LABS:                        13.2   10.03 )-----------( 294      ( 15 May 2019 06:50 )             40.5     05-15    139  |  107  |  15  ----------------------------<  102<H>  3.7   |  27  |  0.76    Ca    9.0      15 May 2019 06:50  Phos  2.9     05-15  Mg     2.5     05-15          CAPILLARY BLOOD GLUCOSE            RADIOLOGY & ADDITIONAL TESTS:    Imaging Personally Reviewed:  YES    Consultant(s) Notes Reviewed:   YES    Care Discussed with Consultants :     Plan of care was discussed with patient and /or primary care giver; all questions and concerns were addressed and care was aligned with patient's wishes.

## 2019-05-15 NOTE — PROGRESS NOTE ADULT - PROBLEM SELECTOR PLAN 1
Presented with room spinning sensation, worsening with head movement and improvement with rest and closing eyes. Nystagmus negative and orthostatic negative on lying down and sitting. DDx Central vs veritgo. CT head: Mild chronic microvascular changes without evidence of an acute transcortical infarction or hemorrhage. Cardiac enzymes non-significant. EKG - NSR no stt wave changes. CD showed no hemodynamically significant internal carotid artery stenosis by velocity criteria. Irregular arterial pulses are noted.  -Lactate normalized to 1.6  -C/w aspirin, atorvastatin, and meclizine  -C/w tele monitoring and neruo checks   -UCx negative  -BCx negative to final report  -Orthostatic pressure was positive 5/13 AM  -MR brain neg  -US renal only showed L renal showed without acute pathology  -Dizziness likely 2/2 peripheral vertigo. Meclizine dosage increased to 50 mg once a day with persistnet and significant dizziness.  -Meclizine will be switched to PRN basis when dizziness improves  -TTE and Stress NL  -Pt to f/u with OP vestibular rehab  -F/u PT  -Pt now on standing low dose Ativan  -Pt c/o persistent significant dizziness again this AM  -Neuro Dr. Marcano asked for a follow up

## 2019-05-15 NOTE — PROGRESS NOTE ADULT - SUBJECTIVE AND OBJECTIVE BOX
CHIEF COMPLAINT: Patient is a 84y old  Female who presents with a chief complaint of dizziness and upper abdominal pain. Pt appears comfortable.    	  REVIEW OF SYSTEMS:  CONSTITUTIONAL: No fever, weight loss, or fatigue  EYES: No eye pain, visual disturbances, or discharge  ENT:  No difficulty hearing, tinnitus, vertigo; No sinus or throat pain  NECK: No pain or stiffness  RESPIRATORY: No cough, wheezing, chills or hemoptysis; No Shortness of Breath  CARDIOVASCULAR: No chest pain, palpitations, passing out, dizziness, or leg swelling  GASTROINTESTINAL: No abdominal or epigastric pain. No nausea, vomiting, or hematemesis; No diarrhea or constipation. No melena or hematochezia.  GENITOURINARY: No dysuria, frequency, hematuria, or incontinence  NEUROLOGICAL: No headaches, memory loss, loss of strength, numbness, or tremors  SKIN: No itching, burning, rashes, or lesions   LYMPH Nodes: No enlarged glands  ENDOCRINE: No heat or cold intolerance; No hair loss  MUSCULOSKELETAL: No joint pain or swelling; No muscle, back, or extremity pain  PSYCHIATRIC: No depression, anxiety, mood swings, or difficulty sleeping  HEME/LYMPH: No easy bruising, or bleeding gums  ALLERGY AND IMMUNOLOGIC: No hives or eczema	      PHYSICAL EXAM:  T(C): 36.7 (05-15-19 @ 05:04), Max: 36.8 (05-14-19 @ 19:52)  HR: 100 (05-15-19 @ 05:04) (71 - 100)  BP: 150/75 (05-15-19 @ 05:04) (150/75 - 190/77)  RR: 18 (05-15-19 @ 05:04) (18 - 18)  SpO2: 94% (05-15-19 @ 05:04) (94% - 100%)  Wt(kg): --  I&O's Summary    14 May 2019 07:01  -  15 May 2019 07:00  --------------------------------------------------------  IN: 236 mL / OUT: 0 mL / NET: 236 mL        Appearance: Normal	  HEENT:   Normal oral mucosa, PERRL, EOMI	  Lymphatic: No lymphadenopathy  Cardiovascular: Normal S1 S2, No JVD, No murmurs, No edema  Respiratory: Lungs clear to auscultation	  Psychiatry: A & O x 3, Mood & affect appropriate  Gastrointestinal:  Soft, Non-tender, + BS	  Skin: No rashes, No ecchymoses, No cyanosis	  Neurologic: Non-focal  Extremities: Normal range of motion, No clubbing, cyanosis or edema  Vascular: Peripheral pulses palpable 2+ bilaterally    MEDICATIONS  (STANDING):  aspirin enteric coated 81 milliGRAM(s) Oral daily  atorvastatin 80 milliGRAM(s) Oral at bedtime  cefTRIAXone   IVPB 1 Gram(s) IV Intermittent every 24 hours  cefTRIAXone   IVPB      cholecalciferol 1000 Unit(s) Oral daily  donepezil 10 milliGRAM(s) Oral at bedtime  enoxaparin Injectable 40 milliGRAM(s) SubCutaneous daily  ergocalciferol 07319 Unit(s) Oral <User Schedule>  LORazepam     Tablet 0.5 milliGRAM(s) Oral daily  losartan 50 milliGRAM(s) Oral two times a day  meclizine 50 milliGRAM(s) Oral every 8 hours  memantine 10 milliGRAM(s) Oral daily  pantoprazole    Tablet 40 milliGRAM(s) Oral before breakfast        	  LABS:	 	                        13.2   10.03 )-----------( 294      ( 15 May 2019 06:50 )             40.5     05-15    139  |  107  |  15  ----------------------------<  102<H>  3.7   |  27  |  0.76    Ca    9.0      15 May 2019 06:50  Phos  2.9     05-15  Mg     2.5     05-15        Lipid Profile: Cholesterol 198    HDL 71      HgA1c: Hemoglobin A1C, Whole Blood: 5.8 % (05-10 @ 09:22)    TSH: Thyroid Stimulating Hormone, Serum: 1.07 uU/mL (05-10 @ 06:04)

## 2019-05-15 NOTE — DISCHARGE NOTE PROVIDER - HOSPITAL COURSE
HPI:    84/F form home lives with daughter independent at baseline with PMH of dementia, HTN, HLD presented with dizziness and upper abdominal pain. Patient reports that today she started having dizziness which she describes as room spinning sensation associated with nausea, worsening with head movement, improving with lying down and closed eyes, she had similar episode in past for which she was told that its form ear problems and which improved with medications.  Denies episode of vomiting, fall, ear pain or discharge. She also mentions epigastric colicky pain which is 8/10 in intensity, no aggravating or reliving factors. Denies vomiting, diarrhea, SOB, SCHWARTZ, palpitations, headache, cough, wheezing, joint pain or swelling, fever, chills. (09 May 2019 22:33) HPI:    84/F form home lives with daughter independent at baseline with PMH of dementia, HTN, HLD presented with dizziness and upper abdominal pain. Patient reports that today she started having dizziness which she describes as room spinning sensation associated with nausea, worsening with head movement, improving with lying down and closed eyes, she had similar episode in past for which she was told that its form ear problems and which improved with medications.  Denies episode of vomiting, fall, ear pain or discharge. She also mentions epigastric colicky pain which is 8/10 in intensity, no aggravating or reliving factors. Denies vomiting, diarrhea, SOB, SCHWARTZ, palpitations, headache, cough, wheezing, joint pain or swelling, fever, chills. (09 May 2019 22:33)            Dizziness: Presented with room spinning sensation, worsening with head movement and improvement with rest and closing eyes. Nystagmus negative and orthostatic negative on lying down and sitting. DDx Central vs veritgo. CT head: Mild chronic microvascular changes without evidence of an acute transcortical infarction or hemorrhage. Cardiac enzymes non-significant. EKG - NSR no stt wave changes. CD showed no hemodynamically significant internal carotid artery stenosis by velocity criteria. Irregular arterial pulses are noted.    -Lactate normalized to 1.6    -C/w aspirin, atorvastatin, and meclizine    -C/w tele monitoring and neruo checks     -UCx negative    -BCx negative to final report    -Orthostatic pressure was positive 5/13 AM    -MR brain neg    -US renal only showed L renal showed without acute pathology    -Dizziness likely 2/2 peripheral vertigo. Meclizine dosage increased to 50 mg once a day with persistnet and significant dizziness.    -Meclizine will be switched to PRN basis when dizziness improves    -TTE and Stress NL    -Pt to f/u with OP vestibular rehab    -Pt now on standing low dose Ativan    -Pt c/o persistent significant dizziness again this AM    -Neuro Dr. Marcano follow up noted.     -Pt will d/c home with vestibular physical therapy and out patient ENT follow up.        Left leg cellulitis:     resolving     Pt is on rocephin will d/c on ceftin for total of 7-10 days           Epigastric abdominal pain - colicky with bloating     -CT abdomen suggestive of kidney lesion - will get renal USG     -C/w Maalox    -US abdomen only showed hepatic steatosis and bilateral renal cysts    -Pt started on Simethicone given negative US abdomen for abdominal pain.         Elevated troponin.  Plan: T1 0.054, T2 of 0.067 trend serial cardiac enzymes     EKG- NSR no stt wave changes    d/c tele monitoring.          Abnormal CT scan.  Plan: CT A/P: 3.8 cm indeterminate hypodense lesion in the left kidney with a Hounsfield unit of 28.    -US renal only showed L renal showed without acute pathology.         HTN (hypertension).  Plan: on losartan - HCTZ, atenolol and amlodipine    -SBP in 190s this AM. Losartan increased to 50 mg BID per Cardiology. New agent to be added tomorrow if BP still remains uncontrolled    -BP better controlled today on adjusted BP regimen. HPI:    84/F form home lives with daughter independent at baseline with PMH of dementia, HTN, HLD presented with dizziness and upper abdominal pain. Patient reports that today she started having dizziness which she describes as room spinning sensation associated with nausea, worsening with head movement, improving with lying down and closed eyes, she had similar episode in past for which she was told that its form ear problems and which improved with medications.  Denies episode of vomiting, fall, ear pain or discharge. She also mentions epigastric colicky pain which is 8/10 in intensity, no aggravating or reliving factors. Denies vomiting, diarrhea, SOB, SCHWARTZ, palpitations, headache, cough, wheezing, joint pain or swelling, fever, chills. (09 May 2019 22:33)            Dizziness: Presented with room spinning sensation, worsening with head movement and improvement with rest and closing eyes. Nystagmus negative and orthostatic negative on lying down and sitting. DDx Central vs veritgo. CT head: Mild chronic microvascular changes without evidence of an acute transcortical infarction or hemorrhage. Cardiac enzymes non-significant. EKG - NSR no stt wave changes. CD showed no hemodynamically significant internal carotid artery stenosis by velocity criteria. Irregular arterial pulses are noted.    -Lactate normalized to 1.6    -C/w aspirin, atorvastatin, and meclizine    -C/w tele monitoring and neruo checks     -UCx negative    -BCx negative to final report    -Orthostatic pressure was positive 5/13 AM    -MR brain neg    -US renal only showed L renal showed without acute pathology    -Dizziness likely 2/2 peripheral vertigo. Meclizine dosage increased to 50 mg once a day with persistnet and significant dizziness.    -Meclizine will be switched to PRN basis when dizziness improves    -TTE and Stress NL    -Pt to f/u with OP vestibular rehab    -Pt now on standing low dose Ativan    -Pt c/o persistent significant dizziness again this AM    -Neuro Dr. Marcano follow up noted.     -Pt will d/c home with vestibular physical therapy and out patient ENT follow up.        Left leg gout c/w steroid, pt showed improvement          Epigastric abdominal pain - colicky with bloating     -CT abdomen suggestive of kidney lesion - will get renal USG     -C/w Maalox    -US abdomen only showed hepatic steatosis and bilateral renal cysts    -Pt started on Simethicone given negative US abdomen for abdominal pain.         Elevated troponin.  Plan: T1 0.054, T2 of 0.067 trend serial cardiac enzymes     EKG- NSR no stt wave changes    d/c tele monitoring.          Abnormal CT scan.  Plan: CT A/P: 3.8 cm indeterminate hypodense lesion in the left kidney with a Hounsfield unit of 28.    -US renal only showed L renal showed without acute pathology.         HTN (hypertension).  Plan: on losartan - HCTZ, atenolol and amlodipine    -SBP in 190s this AM. Losartan increased to 50 mg BID per Cardiology. New agent to be added tomorrow if BP still remains uncontrolled    -BP better controlled today on adjusted BP regimen.

## 2019-05-15 NOTE — CONSULT NOTE ADULT - REASON FOR ADMISSION
dizziness and upper abdominal pain

## 2019-05-15 NOTE — PROGRESS NOTE ADULT - SUBJECTIVE AND OBJECTIVE BOX
Patient is a 84y old  Female who presents with a chief complaint of dizziness and upper abdominal pain (15 May 2019 09:06)    pt seen in tele [ x ], reg med floor [   ], bed [ ], chair at bedside [ x  ], a+o x3 [  ], lethargic [  ],  nad [ x ]    still with dizziness, also with complaints of ruq pain moving to epigastrium and burping      Allergies    No Known Allergies        Vitals    T(F): 97.9 (05-15-19 @ 11:31), Max: 98.4 (05-15-19 @ 07:21)  HR: 86 (05-15-19 @ 11:31) (71 - 100)  BP: 144/67 (05-15-19 @ 11:31) (142/75 - 179/75)  RR: 18 (05-15-19 @ 11:31) (17 - 18)  SpO2: 97% (05-15-19 @ 11:31) (94% - 100%)  Wt(kg): --  CAPILLARY BLOOD GLUCOSE          Labs                          13.2   10.03 )-----------( 294      ( 15 May 2019 06:50 )             40.5       05-15    139  |  107  |  15  ----------------------------<  102<H>  3.7   |  27  |  0.76    Ca    9.0      15 May 2019 06:50  Phos  2.9     05-15  Mg     2.5     05-15      < from: Nuclear Stress Test-Pharmacologic (05.13.19 @ 09:41) >  IMPRESSIONS:Normal Study  * Negative ECG evidence of ischemia after IV of Lexiscan.  * Review of raw data shows: The study isof good technical  quality.  * The left ventricle was normal in size. Normal myocardial  perfusion scan, with no evidence of infarction or  inducible ischemia.  * Post-stress resting myocardial perfusion gated SPECT  imaging was performed (LVEF > 70%)    < end of copied text >          .Blood  05-10 @ 19:03   No growth to date.  --  --      .Urine  05-10 @ 00:46   <10,000 CFU/mL Normal Urogenital Laura  --  --          Radiology Results      < from: US Abdomen Complete (05.14.19 @ 09:44) >  Abdominal ultrasound is performed without a previous examination for   comparison. The liver spans 12.9cm which is within normal limits in size.   The liver is diffusely fatty without evidence for a focal lesion. Duplex   Doppler interrogation of the main portal vein demonstrates normal   hepatopetal flow. There isno intra or extrahepatic biliary ductal   dilatation. The common bile duct measures 6.2 mm in caliber which is   within normal limits for age. The gallbladder is not visualized, likely   surgically absent. The visualized pancreas maintains normal echogenicity   and morphology. The spleen measures 8.5 cm in length which is within   normal limits. No ascites.    The right kidney measures 9.6 cm in length and the left kidney measures   9.6 cm in length. No hydronephrosis. Bilateral renal cysts measuring up   to 3.7 cm on the left.     The IVC and aorta appear grossly normal.    Impression: Hepatic steatosis.    Bilateral renal cysts.    < end of copied text >      Meds    MEDICATIONS  (STANDING):  aspirin enteric coated 81 milliGRAM(s) Oral daily  atorvastatin 80 milliGRAM(s) Oral at bedtime  cefTRIAXone   IVPB 1 Gram(s) IV Intermittent every 24 hours  cefTRIAXone   IVPB      cholecalciferol 1000 Unit(s) Oral daily  donepezil 10 milliGRAM(s) Oral at bedtime  enoxaparin Injectable 40 milliGRAM(s) SubCutaneous daily  ergocalciferol 58265 Unit(s) Oral <User Schedule>  LORazepam     Tablet 0.5 milliGRAM(s) Oral daily  losartan 50 milliGRAM(s) Oral two times a day  meclizine 50 milliGRAM(s) Oral every 8 hours  memantine 10 milliGRAM(s) Oral daily  pantoprazole    Tablet 40 milliGRAM(s) Oral before breakfast      MEDICATIONS  (PRN):  acetaminophen   Tablet .. 650 milliGRAM(s) Oral every 6 hours PRN Temp greater or equal to 38C (100.4F), Mild Pain (1 - 3)  aluminum hydroxide/magnesium hydroxide/simethicone Suspension 30 milliLiter(s) Oral every 6 hours PRN Dyspepsia  sodium chloride 0.65% Nasal 1 Spray(s) Both Nostrils three times a day PRN Nasal Congestion      Physical Exam      Neuro :  no focal deficits except b/l nystagmus   Respiratory: CTA B/L  CV: RRR, S1S2, no murmurs,   Abdominal: Soft, NT, ND +BS,  Extremities: No edema, + peripheral pulses      ASSESSMENT    uti  Dizziness and giddiness  abd pain   vertigo   renal lesion   h/o HTN  cholecystectomy      PLAN    cont meclizine 50mg tid  outpt vestibular therapy   neuro f/u  MRI brain noted wnl   cont statin, asa, meclizine   cont tele   ua +ve   ceftriaxone   ct head noted   ct abd noted   blood cx neg noted above  urine cx wnl  echo noted EF > 55%   carotid doppler noted -ve  abd us noted : b/l renal cyst and hepatic steatosis  cardio f/u  d/c tele  stress test neg noted above  PT cons   add simethicone for abd pain and gas  continue current meds

## 2019-05-15 NOTE — DISCHARGE NOTE PROVIDER - NSDCCPCAREPLAN_GEN_ALL_CORE_FT
PRINCIPAL DISCHARGE DIAGNOSIS  Diagnosis: Vertigo  Assessment and Plan of Treatment: You were admitted to the hospital for vertigo workup. You cardiac and neurological work up. Your vertigo is most likley from peripheral vertigo/ Please take all your medications as advised and follow up with your ENT doctor for further management. You had physical therapy reccomended Vestibular physical therapy.      SECONDARY DISCHARGE DIAGNOSES  Diagnosis: Fatty liver  Assessment and Plan of Treatment: On ultrasound of abdomen you are found to have fatty liver. Please follow up with Dr. North as out patient.    Diagnosis: Bilateral renal cysts  Assessment and Plan of Treatment: Please follow up with kidney doctor as out patient.    Diagnosis: GERD (gastroesophageal reflux disease)  Assessment and Plan of Treatment: You were found to have acid reflux. Please take all medications as advised. You were seen by Dr. North and You are advised to have out patient endoscopy.    Diagnosis: Cellulitis of left foot  Assessment and Plan of Treatment: You were found to have left foot cellulitis, Please take your antibiotic as advsied.    Diagnosis: HTN (hypertension)  Assessment and Plan of Treatment: Continue with blood pressure medication. Maintain a healthy diet that consist of low sugar, low fat, low sodium diet. Exercise frequently if possible.  Follow up with primary care physician in one week after discharge. PRINCIPAL DISCHARGE DIAGNOSIS  Diagnosis: Vertigo  Assessment and Plan of Treatment: You were admitted to the hospital for vertigo workup. You cardiac and neurological work up. Your vertigo is most likley from peripheral vertigo/ Please take all your medications as advised and follow up with your ENT doctor for further management. You had physical therapy reccomended Vestibular physical therapy.      SECONDARY DISCHARGE DIAGNOSES  Diagnosis: Gout flare  Assessment and Plan of Treatment: You were found to have gout flare on 1st metatarsal of left foot. Please take prednisone as advised and follow up with your primary care doctor in a week.    Diagnosis: Fatty liver  Assessment and Plan of Treatment: On ultrasound of abdomen you are found to have fatty liver. Please follow up with Dr. North as out patient.    Diagnosis: Bilateral renal cysts  Assessment and Plan of Treatment: Please follow up with kidney doctor as out patient.    Diagnosis: GERD (gastroesophageal reflux disease)  Assessment and Plan of Treatment: You were found to have acid reflux. Please take all medications as advised. You were seen by Dr. North and You are advised to have out patient endoscopy.    Diagnosis: HTN (hypertension)  Assessment and Plan of Treatment: Continue with blood pressure medication. Maintain a healthy diet that consist of low sugar, low fat, low sodium diet. Exercise frequently if possible.  Follow up with primary care physician in one week after discharge.

## 2019-05-15 NOTE — CONSULT NOTE ADULT - SUBJECTIVE AND OBJECTIVE BOX
[  ] STAT REQUEST              [  ]ROUTINE REQUEST    Patient is a 84 year old female with abdominal pain. GI consulted to evaluate.         HPI:  84 year old female with multiple medical problems presented wit dizziness also c/o epigastric abdominal pain. Patient denies nausea, vomiting, hematemesis, hematochezia, melena, fever, chills, chest pain, SOB, cough, hematuria, dysuria or diarrhea          PAIN MANAGEMENT:  Pain Scale:                6-7 /10  Pain Location:  Epigastric abdominal pain    Prior Colonoscopy:  UNknown    PAST MEDICAL HISTORY  HTN (hypertension)  Dementia  HLD      PAST SURGICAL HISTORY  Cholecystectomy      Allergies    No Known Allergies          MEDICATIONS  (STANDING):  aspirin enteric coated 81 milliGRAM(s) Oral daily  atorvastatin 80 milliGRAM(s) Oral at bedtime  cefTRIAXone   IVPB 1 Gram(s) IV Intermittent every 24 hours  cefTRIAXone   IVPB      cholecalciferol 1000 Unit(s) Oral daily  donepezil 10 milliGRAM(s) Oral at bedtime  enoxaparin Injectable 40 milliGRAM(s) SubCutaneous daily  ergocalciferol 63281 Unit(s) Oral <User Schedule>  LORazepam     Tablet 0.5 milliGRAM(s) Oral daily  losartan 50 milliGRAM(s) Oral two times a day  meclizine 50 milliGRAM(s) Oral every 8 hours  memantine 10 milliGRAM(s) Oral daily  pantoprazole    Tablet 40 milliGRAM(s) Oral before breakfast    MEDICATIONS  (PRN):  acetaminophen   Tablet .. 650 milliGRAM(s) Oral every 6 hours PRN Temp greater or equal to 38C (100.4F), Mild Pain (1 - 3)  aluminum hydroxide/magnesium hydroxide/simethicone Suspension 30 milliLiter(s) Oral every 6 hours PRN Dyspepsia  simethicone 80 milliGRAM(s) Chew four times a day PRN Gas  sodium chloride 0.65% Nasal 1 Spray(s) Both Nostrils three times a day PRN Nasal Congestion      SOCIAL HISTORY  Advanced Directives:       [ X ] Full Code       [  ] DNR  Marital Status:         [  ] M      [ X ] S      [  ] D       [  ] W  Children:       [ X ] Yes      [  ] No  Occupation:        [  ] Employed       [ X ] Unemployed       [  ] Retired  Diet:       [ X ] Regular       [  ] PEG feeding          [  ] NG tube feeding  Drug Use:           [ X ] Patient denied          [  ] Yes  Alcohol:           [ X ] No             [  ] Yes (socially)         [  ] Yes (chronic)  Tobacco:           [  ] Yes           [ X ] No        FAMILY HISTORY  [ X ] Heart Disease            [  ] Diabetes             [  ] HTN             [  ] Colon Cancer             [  ] Stomach Cancer              [  ] Pancreatic Cancer        VITAL SIGNS   Vital Signs Last 24 Hrs  T(C): 36.6 (15 May 2019 11:31), Max: 36.9 (15 May 2019 07:21)  T(F): 97.9 (15 May 2019 11:31), Max: 98.4 (15 May 2019 07:21)  HR: 86 (15 May 2019 11:31) (71 - 100)  BP: 144/67 (15 May 2019 11:31) (142/75 - 179/75)   RR: 18 (15 May 2019 11:31) (17 - 18)  SpO2: 97% (15 May 2019 11:31) (94% - 100%)    Daily Weight in k (15 May 2019 05:04)           CBC Full  -  ( 15 May 2019 06:50 )  WBC Count : 10.03 K/uL  RBC Count : 4.41 M/uL  Hemoglobin : 13.2 g/dL  Hematocrit : 40.5 %  Platelet Count - Automated : 294 K/uL  Mean Cell Volume : 91.8 fl  Mean Cell Hemoglobin : 29.9 pg  Mean Cell Hemoglobin Concentration : 32.6 gm/dL  Auto Neutrophil # : x  Auto Lymphocyte # : x  Auto Monocyte # : x  Auto Eosinophil # : x  Auto Basophil # : x  Auto Neutrophil % : x  Auto Lymphocyte % : x  Auto Monocyte % : x  Auto Eosinophil % : x  Auto Basophil % : x      05-15    139  |  107  |  15  ----------------------------<  102<H>  3.7   |  27  |  0.76    Ca    9.0      15 May 2019 06:50  Phos  2.9     05-15  Mg     2.5     05-15        Urinalysis (19 @ 19:02)    Glucose Qualitative, Urine: Negative    Blood, Urine: Negative    pH Urine: 7.0    Color: Yellow    Urine Appearance: Clear    Bilirubin: Negative    Ketone - Urine: Negative    Specific Gravity: 1.010    Protein, Urine: Negative    Urobilinogen: Negative    Nitrite: Negative    Leukocyte Esterase Concentration: Trace      ECG  Ventricular Rate 54 BPM    Atrial Rate 54 BPM    P-R Interval 152 ms    QRS Duration 88 ms    Q-T Interval 454 ms    QTC Calculation(Bezet) 430 ms    R Axis 122 degrees    T Axis 149 degrees    Diagnosis Line Sinus bradycardia  Low voltage QRS  Left posterior fascicular block  Moderate voltage criteria for LVH, may be normal variant  Abnormal ECG      RADIOLOGY/IMAGING          EXAM:  CT ABDOMEN AND PELVIS IC                            PROCEDURE DATE:  2019          INTERPRETATION:  CT of the abdomen and pelvis with IV contrast    Clinical Indication: upper abdominal pain    Technique: Axial multidetector CT images of the abdomen and pelvis are   acquired following the administration of IV contrast (90 cc Omnipaque-350   administered, 10 cc discarded).    Comparison: None.    Findings: Limited sections through the lung bases demonstrate small   bilateral atelectasis.    Cholecystectomy clips are present. Hepatic steatosis. The pancreas,   spleen, and the right adrenal appear unremarkable. There is a 3.8 cm   indeterminate hypodense lesion in the left kidney with a Hounsfield unit   of 28. Renal ultrasound isrecommended for further evaluation. Tiny   hypodense lesion in the right kidney, too small to characterize.   Nonspecific mild left adrenal thickening.    The appendix appears normal. Colonic diverticulosis without evidence for   diverticulitis. Small hiatal hernia. No bowel obstruction, or grossly   thickened bowel wall.    No evidence for free air, ascites, or enlarged lymph node.    The urinary bladder appears unremarkable. Small calcified uterine   fibroid. The endometrium appears prominent.    Impression: Hepatic steatosis.    3.8 cm indeterminate hypodense lesion in the left kidney with a   Hounsfield unit of 28. Renal ultrasound is recommended for further   evaluation.    The appendix appears normal. Colonic diverticulosis without evidence for   diverticulitis. Small hiatal hernia. No bowel obstruction, or grossly   thickened bowel wall.    The endometrium appears prominent. Pelvic ultrasound is recommended for   further evaluation.            EXAM:  US ABDOMEN COMPLETE                            PROCEDURE DATE:  2019          INTERPRETATION:  Abdominal ultrasound    Indication: Right upper quadrant abdominal pain    Abdominal ultrasound is performed without a previous examination for   comparison. The liver spans 12.9cm which is within normal limits in size.   The liver is diffusely fatty without evidence for a focal lesion. Duplex   Doppler interrogation of the main portal vein demonstrates normal   hepatopetal flow. There isno intra or extrahepatic biliary ductal   dilatation. The common bile duct measures 6.2 mm in caliber which is   within normal limits for age. The gallbladder is not visualized, likely   surgically absent. The visualized pancreas maintains normal echogenicity   and morphology. The spleen measures 8.5 cm in length which is within   normal limits. No ascites.    The right kidney measures 9.6 cm in length and the left kidney measures   9.6 cm in length. No hydronephrosis. Bilateral renal cysts measuring up   to 3.7 cm on the left.     The IVC and aorta appear grossly normal.    Impression: Hepatic steatosis.    Bilateral renal cysts.

## 2019-05-15 NOTE — PROGRESS NOTE ADULT - SUBJECTIVE AND OBJECTIVE BOX
Interview conducted in Ethiopian  The patient continues to have vertigo when sitting forward or attempting to stand, not when reclining or lying down  No nystagmus and negative Delcambre-Hallpike on exam; no sensory or motor deficits  MRI Brain normal - Dx likely peripheral vertigo (patient had similar episode attributed to b/l inner ear dysfunction in October 2018).    Recs:  - Can increase meclizine 50mg from Q8H to Q6H  - Can also consider increasing lorazepam 0.5mg daily to BID to help manage symptoms  - PT/OT for vestibular therapy  - Set up otolaryngology follow-up  - Consider providing handout on self-administration of Epley maneuver at home          NOTE TO BE COMPLETED    Neurology Follow up note    Name  JUDE BILLY    HPI:  84/F form home lives with daughter independent at baseline with PMH of dementia, HTN, HLD presented with dizziness and upper abdominal pain. Patient reports that today she started having dizziness which she describes as room spinning sensation associated with nausea, worsening with head movement, improving with lying down and closed eyes, she had similar episode in past for which she was told that its form ear problems and which improved with medications.  Denies episode of vomiting, fall, ear pain or discharge. She also mentions epigastric colicky pain which is 8/10 in intensity, no aggravating or reliving factors. Denies vomiting, diarrhea, SOB, SCHWARTZ, palpitations, headache, cough, wheezing, joint pain or swelling, fever, chills. (09 May 2019 22:33)      Interval History -        Subjective:        MEDICATIONS  (STANDING):  aspirin enteric coated 81 milliGRAM(s) Oral daily  atorvastatin 80 milliGRAM(s) Oral at bedtime  cefTRIAXone   IVPB 1 Gram(s) IV Intermittent every 24 hours  cefTRIAXone   IVPB      cholecalciferol 1000 Unit(s) Oral daily  donepezil 10 milliGRAM(s) Oral at bedtime  enoxaparin Injectable 40 milliGRAM(s) SubCutaneous daily  ergocalciferol 50031 Unit(s) Oral <User Schedule>  LORazepam     Tablet 0.5 milliGRAM(s) Oral daily  losartan 50 milliGRAM(s) Oral two times a day  meclizine 50 milliGRAM(s) Oral every 8 hours  memantine 10 milliGRAM(s) Oral daily  pantoprazole    Tablet 40 milliGRAM(s) Oral before breakfast    MEDICATIONS  (PRN):  acetaminophen   Tablet .. 650 milliGRAM(s) Oral every 6 hours PRN Temp greater or equal to 38C (100.4F), Mild Pain (1 - 3)  aluminum hydroxide/magnesium hydroxide/simethicone Suspension 30 milliLiter(s) Oral every 6 hours PRN Dyspepsia  simethicone 80 milliGRAM(s) Chew four times a day PRN Gas  sodium chloride 0.65% Nasal 1 Spray(s) Both Nostrils three times a day PRN Nasal Congestion      Allergies    No Known Allergies    Intolerances        Review of Systems:  General: [ ] None, [ ] chills, [ ]fatigue, [ ] fevers  Skin: [ ] None, [ ] rash   HEENT: [ ] None, [ ] head injury, [ ] blurred vision, [ ] double vision, [ ] eye pain, [ ] visual loss, [ ] hearing loss, [ ] deafness, [ ] ear pain, [ ] ringing in the ears, [ ] vertigo, [ ] sinus pain, [ ] voice changes  Neck: [ ] None, [ ] neck stiffness  Respiratory: [ ] None, [ ] cough, [ ] difficulty breathing  Cardiovascular: [ ] None, [ ] calf cramps, [ ] chest pain, [ ] leg pain, [ ] swelling, [ ] rapid heart rate, [ ] shortness of breath  Gastrointestinal: [ ] None, [ ] abdominal pain, [ ] nausea, [ ] vomiting  Musculoskeletal: [ ] None, [ ] back pain, [ ] joint pain, [ ] joint stiffness, [ ] leg cramps, [ ] muscle atrophy, [ ] muscle cramps, [ ] muscle weakness, [ ] swelling of extremities  Neurological: [ ] None, [ ] Dizziness, [ ] decreased memory, [ ] fainting, [ ] focal neurological symptoms, [ ] headaches, [ ] incontinence of stool, [ ] incontinence of urine, [ ] loss of consciousness, [ ] numbness, [ ] seizures, [ ] spinning sensation, [ ] stroke, [ ] trouble walking, [ ] unsteadiness, [ ] visual changes, [ ] weakness  Psychiatric: [ ] None,  [ ] depression, [ ] anxiety, [ ] hallucinations, [ ] inability to concentrate, [ ] mood changes, [ ] panic attacks  Hematology: [ ] None,  [ ] blood clots, [ ] spontaneous bleeding      Objective:   Vital Signs Last 24 Hrs  T(C): 36.6 (15 May 2019 11:31), Max: 36.9 (15 May 2019 07:21)  T(F): 97.9 (15 May 2019 11:31), Max: 98.4 (15 May 2019 07:21)  HR: 86 (15 May 2019 11:31) (71 - 100)  BP: 144/67 (15 May 2019 11:31) (142/75 - 179/75)  BP(mean): --  RR: 18 (15 May 2019 11:31) (17 - 18)  SpO2: 97% (15 May 2019 11:31) (94% - 100%)    General Exam:   General appearance: No acute distress                 Cardiovascular: Pedal dorsalis pulses intact bilaterally    Neurological Exam:  Mental Status: Orientated to self, date and place.  Attention intact.  No dysarthria, aphasia or neglect.  Knowledge intact.  Registration intact.  Short and long term memory grossly intact.      Cranial Nerves: CN I - not tested.  PERRL, EOMI, VFF, no nystagmus or diplopia.  No APD.  Fundi not visualized bilaterally.  CN V1-3 intact to light touch and pinprick.  No facial asymmetry.  Hearing intact to finger rub bilaterally.  Tongue, uvula and palate midline.  Sternocleidomastoid and Trapezius intact bilaterally.    Motor:   Tone: normal.                  Strength: intact throughout  Pronator drift: none                 Dysmeria: None to finger-nose-finger or heel-shin-heel  No truncal ataxia.    Tremor: No resting, postural or action tremor.  No myoclonus.    Sensation: intact to light touch, pinprick, vibration and proprioception    Deep Tendon Reflexes: 1+ bilateral biceps, triceps, brachioradialis, knee and ankle  Toes flexor bilaterally    Gait: normal and stable.      Other:    05-15    139  |  107  |  15  ----------------------------<  102<H>  3.7   |  27  |  0.76    Ca    9.0      15 May 2019 06:50  Phos  2.9     05-15  Mg     2.5     05-15              Radiology    EKG:  tele:  TTE:  EEG:                 Please contact the Neurology consult service with any questions.    Mane Marcano MD  Neurology Attending  Health system Neurology Follow up note    Name  JUDE BILLY    HPI:  84/F form home lives with daughter independent at baseline with PMH of dementia, HTN, HLD presented with dizziness and upper abdominal pain. Patient reports that today she started having dizziness which she describes as room spinning sensation associated with nausea, worsening with head movement, improving with lying down and closed eyes, she had similar episode in past for which she was told that its form ear problems and which improved with medications.  Denies episode of vomiting, fall, ear pain or discharge. She also mentions epigastric colicky pain which is 8/10 in intensity, no aggravating or reliving factors. Denies vomiting, diarrhea, SOB, SCHWARTZ, palpitations, headache, cough, wheezing, joint pain or swelling, fever, chills. (09 May 2019 22:33)    Interval History -  Interview is conducted in [place]. The patient continues to have vertigo when sitting forward or attempting to stand, not when reclining or lying down. The patient recounts that she had a similar episode in October 2018, attributed to bilateral inner ear dysfunction.    MEDICATIONS  (STANDING):  aspirin enteric coated 81 milliGRAM(s) Oral daily  atorvastatin 80 milliGRAM(s) Oral at bedtime  cefTRIAXone   IVPB 1 Gram(s) IV Intermittent every 24 hours  cefTRIAXone   IVPB      cholecalciferol 1000 Unit(s) Oral daily  donepezil 10 milliGRAM(s) Oral at bedtime  enoxaparin Injectable 40 milliGRAM(s) SubCutaneous daily  ergocalciferol 35904 Unit(s) Oral <User Schedule>  LORazepam     Tablet 0.5 milliGRAM(s) Oral daily  losartan 50 milliGRAM(s) Oral two times a day  meclizine 50 milliGRAM(s) Oral every 8 hours  memantine 10 milliGRAM(s) Oral daily  pantoprazole    Tablet 40 milliGRAM(s) Oral before breakfast    MEDICATIONS  (PRN):  acetaminophen   Tablet .. 650 milliGRAM(s) Oral every 6 hours PRN Temp greater or equal to 38C (100.4F), Mild Pain (1 - 3)  aluminum hydroxide/magnesium hydroxide/simethicone Suspension 30 milliLiter(s) Oral every 6 hours PRN Dyspepsia  simethicone 80 milliGRAM(s) Chew four times a day PRN Gas  sodium chloride 0.65% Nasal 1 Spray(s) Both Nostrils three times a day PRN Nasal Congestion    Allergies  No Known Allergies    Review of Systems: Fourteen systems reviewed and negative except as in HPI / Interval History.      Objective:   Vital Signs Last 24 Hrs  T(C): 36.6 (15 May 2019 11:31), Max: 36.9 (15 May 2019 07:21)  T(F): 97.9 (15 May 2019 11:31), Max: 98.4 (15 May 2019 07:21)  HR: 86 (15 May 2019 11:31) (71 - 100)  BP: 144/67 (15 May 2019 11:31) (142/75 - 179/75)  RR: 18 (15 May 2019 11:31) (17 - 18)  SpO2: 97% (15 May 2019 11:31) (94% - 100%)    General Exam:  General: No acute distress  Respiratory: CTAB/l.  No crackles, rhonchi, or wheezes.  Cardiovascular: RRR, No murmurs, Full b/l radial and pedal pulses    Neurological Exam:  General / Mental Status: Oriented to person, place, and time.  No dysarthria or aphasia present.  Naming and repetition intact.  Cranial Nerves: PERRLA, EOMI x 2, VFF x 4, No nystagmus or diplopia, Optic discs normal b/l.  B/l V1-V3 equal to light touch and pinprick.  Symmetric facial movement and palate elevation.  B/l hearing equal to finger rub.  Negative Shania-Hallpike maneuver b/l.  5/5 strength with b/l sternocleidomastoid and trapezius.  Midline tongue protrusion with no atrophy or fasciculations.  Motor: Normal bulk and tone in all four extremities.  5/5 strength throughout all four extremities.  No downward drift, rigidity, spasticity, or tremors in any of the four extremities.  Sensation: Intact to light touch and pinprick in all four extremities.  Negative Romberg.  Coordination: No dysmetria with b/l finger-to-nose and heel raise tests.  Normal rapid alternating movements b/l.  Reflexes: 2+ and symmetric at b/l biceps, triceps, brachioradialis, patellae, and ankles.  Toes flexor b/l.  Gait: Slow, Narrow-based.  Patient unable to participate in specialized gait testing.      Labs:    05-15    139  |  107  |  15  ----------------------------<  102<H>  3.7   |  27  |  0.76    Ca    9.0      15 May 2019 06:50  Phos  2.9     05-15  Mg     2.5     05-15    Lipid Profile (05.10.19 @ 06:04)    Total Cholesterol/HDL Ratio Measurement: 2.8 RATIO    Cholesterol, Serum: 198 mg/dL    Triglycerides, Serum: 135 mg/dL    HDL Cholesterol, Serum: 71 mg/dL    Direct LDL: 100 mg/dL    Hemoglobin A1C, Whole Blood (05.10.19 @ 09:22)    Hemoglobin A1C, Whole Blood: 5.8%    Thyroid Stimulating Hormone, Serum (05.10.19 @ 06:04)    Thyroid Stimulating Hormone, Serum: 1.07 uU/mL    Vitamin B12, Serum (05.10.19 @ 09:18)    Vitamin B12, Serum: 625 pg/mL    Folate, Serum (05.10.19 @ 09:18)    Folate, Serum: 17.1 ng/mL      Neuroimaging:    CT Head (5/9/19):  - No acute intracranial abnormality  - Chronic microvascular disease    Carotid Ultrasound (5/10/19):  - No significant stenosis  - Irregular pulses noted    MRI Brain (5/12/19):  - No acute intracranial abnormality  - Chronic microvascular disease  - Age-related atrophy      Assessment:  84 RHF with likely peripheral vertigo secondary to inner ear dysfunction, without evidence of posterior circulation stroke on MRI Brain.      Recommendations:    - Can increase meclizine 50mg from Q8H to Q6H to manage symptoms    - Can also consider increasing lorazepam 0.5mg daily to BID to help manage symptoms    - PT/OT for vestibular therapy    - Set up otolaryngology outpatient follow-up    - Consider providing handout on self-administration of Epley maneuver at home      Please contact the Neurology consult service with any questions.    Mane Marcano MD  Neurology Attending  [facility], [place]

## 2019-05-16 LAB
ANION GAP SERPL CALC-SCNC: 6 MMOL/L — SIGNIFICANT CHANGE UP (ref 5–17)
BUN SERPL-MCNC: 14 MG/DL — SIGNIFICANT CHANGE UP (ref 7–18)
CALCIUM SERPL-MCNC: 8.9 MG/DL — SIGNIFICANT CHANGE UP (ref 8.4–10.5)
CHLORIDE SERPL-SCNC: 106 MMOL/L — SIGNIFICANT CHANGE UP (ref 96–108)
CO2 SERPL-SCNC: 27 MMOL/L — SIGNIFICANT CHANGE UP (ref 22–31)
CREAT SERPL-MCNC: 0.82 MG/DL — SIGNIFICANT CHANGE UP (ref 0.5–1.3)
GLUCOSE SERPL-MCNC: 113 MG/DL — HIGH (ref 70–99)
HCT VFR BLD CALC: 39.5 % — SIGNIFICANT CHANGE UP (ref 34.5–45)
HGB BLD-MCNC: 12.8 G/DL — SIGNIFICANT CHANGE UP (ref 11.5–15.5)
MAGNESIUM SERPL-MCNC: 2.3 MG/DL — SIGNIFICANT CHANGE UP (ref 1.6–2.6)
MCHC RBC-ENTMCNC: 29.8 PG — SIGNIFICANT CHANGE UP (ref 27–34)
MCHC RBC-ENTMCNC: 32.4 GM/DL — SIGNIFICANT CHANGE UP (ref 32–36)
MCV RBC AUTO: 91.9 FL — SIGNIFICANT CHANGE UP (ref 80–100)
NRBC # BLD: 0 /100 WBCS — SIGNIFICANT CHANGE UP (ref 0–0)
PHOSPHATE SERPL-MCNC: 3 MG/DL — SIGNIFICANT CHANGE UP (ref 2.5–4.5)
PLATELET # BLD AUTO: 270 K/UL — SIGNIFICANT CHANGE UP (ref 150–400)
POTASSIUM SERPL-MCNC: 3.9 MMOL/L — SIGNIFICANT CHANGE UP (ref 3.5–5.3)
POTASSIUM SERPL-SCNC: 3.9 MMOL/L — SIGNIFICANT CHANGE UP (ref 3.5–5.3)
RBC # BLD: 4.3 M/UL — SIGNIFICANT CHANGE UP (ref 3.8–5.2)
RBC # FLD: 12.5 % — SIGNIFICANT CHANGE UP (ref 10.3–14.5)
SODIUM SERPL-SCNC: 139 MMOL/L — SIGNIFICANT CHANGE UP (ref 135–145)
WBC # BLD: 10.54 K/UL — HIGH (ref 3.8–10.5)
WBC # FLD AUTO: 10.54 K/UL — HIGH (ref 3.8–10.5)

## 2019-05-16 PROCEDURE — 99233 SBSQ HOSP IP/OBS HIGH 50: CPT

## 2019-05-16 RX ORDER — MECLIZINE HCL 12.5 MG
50 TABLET ORAL EVERY 6 HOURS
Refills: 0 | Status: DISCONTINUED | OUTPATIENT
Start: 2019-05-16 | End: 2019-05-19

## 2019-05-16 RX ADMIN — PANTOPRAZOLE SODIUM 40 MILLIGRAM(S): 20 TABLET, DELAYED RELEASE ORAL at 05:37

## 2019-05-16 RX ADMIN — LOSARTAN POTASSIUM 50 MILLIGRAM(S): 100 TABLET, FILM COATED ORAL at 18:18

## 2019-05-16 RX ADMIN — ATORVASTATIN CALCIUM 80 MILLIGRAM(S): 80 TABLET, FILM COATED ORAL at 21:40

## 2019-05-16 RX ADMIN — Medication 0.5 MILLIGRAM(S): at 13:04

## 2019-05-16 RX ADMIN — ENOXAPARIN SODIUM 40 MILLIGRAM(S): 100 INJECTION SUBCUTANEOUS at 13:05

## 2019-05-16 RX ADMIN — Medication 50 MILLIGRAM(S): at 08:40

## 2019-05-16 RX ADMIN — LOSARTAN POTASSIUM 50 MILLIGRAM(S): 100 TABLET, FILM COATED ORAL at 05:37

## 2019-05-16 RX ADMIN — Medication 25 MILLIGRAM(S): at 18:18

## 2019-05-16 RX ADMIN — Medication 81 MILLIGRAM(S): at 13:04

## 2019-05-16 RX ADMIN — Medication 40 MILLIGRAM(S): at 14:20

## 2019-05-16 RX ADMIN — MEMANTINE HYDROCHLORIDE 10 MILLIGRAM(S): 10 TABLET ORAL at 13:04

## 2019-05-16 RX ADMIN — Medication 0.5 MILLIGRAM(S): at 21:39

## 2019-05-16 RX ADMIN — Medication 50 MILLIGRAM(S): at 01:16

## 2019-05-16 RX ADMIN — Medication 1000 UNIT(S): at 13:04

## 2019-05-16 RX ADMIN — Medication 0.5 MILLIGRAM(S): at 05:37

## 2019-05-16 RX ADMIN — DONEPEZIL HYDROCHLORIDE 10 MILLIGRAM(S): 10 TABLET, FILM COATED ORAL at 21:40

## 2019-05-16 RX ADMIN — CEFTRIAXONE 100 GRAM(S): 500 INJECTION, POWDER, FOR SOLUTION INTRAMUSCULAR; INTRAVENOUS at 13:07

## 2019-05-16 NOTE — PROGRESS NOTE ADULT - SUBJECTIVE AND OBJECTIVE BOX
- Slightly improved dizziness, but patient does not benefit from attempted Epley maneuver.  - Continue meclizine 50mg Q6H PRN and lorazepam 0.5mg BID  - Set up follow up with otolaryngology      NOTE TO BE COMPLETED    Neurology Follow up note    Name  JUDE BILLY    HPI:  84/F form home lives with daughter independent at baseline with PMH of dementia, HTN, HLD presented with dizziness and upper abdominal pain. Patient reports that today she started having dizziness which she describes as room spinning sensation associated with nausea, worsening with head movement, improving with lying down and closed eyes, she had similar episode in past for which she was told that its form ear problems and which improved with medications.  Denies episode of vomiting, fall, ear pain or discharge. She also mentions epigastric colicky pain which is 8/10 in intensity, no aggravating or reliving factors. Denies vomiting, diarrhea, SOB, SCHWARTZ, palpitations, headache, cough, wheezing, joint pain or swelling, fever, chills. (09 May 2019 22:33)      Interval History -        Subjective:        MEDICATIONS  (STANDING):  aspirin enteric coated 81 milliGRAM(s) Oral daily  atorvastatin 80 milliGRAM(s) Oral at bedtime  cefTRIAXone   IVPB 1 Gram(s) IV Intermittent every 24 hours  cefTRIAXone   IVPB      cholecalciferol 1000 Unit(s) Oral daily  donepezil 10 milliGRAM(s) Oral at bedtime  enoxaparin Injectable 40 milliGRAM(s) SubCutaneous daily  ergocalciferol 78547 Unit(s) Oral <User Schedule>  LORazepam     Tablet 0.5 milliGRAM(s) Oral every 8 hours  losartan 50 milliGRAM(s) Oral two times a day  memantine 10 milliGRAM(s) Oral daily  pantoprazole    Tablet 40 milliGRAM(s) Oral before breakfast  promethazine 25 milliGRAM(s) Oral two times a day    MEDICATIONS  (PRN):  acetaminophen   Tablet .. 650 milliGRAM(s) Oral every 6 hours PRN Temp greater or equal to 38C (100.4F), Mild Pain (1 - 3)  aluminum hydroxide/magnesium hydroxide/simethicone Suspension 30 milliLiter(s) Oral every 6 hours PRN Dyspepsia  meclizine 50 milliGRAM(s) Oral every 6 hours PRN Dizziness  simethicone 80 milliGRAM(s) Chew four times a day PRN Gas  sodium chloride 0.65% Nasal 1 Spray(s) Both Nostrils three times a day PRN Nasal Congestion      Allergies    No Known Allergies    Intolerances        Review of Systems:  General: [ ] None, [ ] chills, [ ]fatigue, [ ] fevers  Skin: [ ] None, [ ] rash   HEENT: [ ] None, [ ] head injury, [ ] blurred vision, [ ] double vision, [ ] eye pain, [ ] visual loss, [ ] hearing loss, [ ] deafness, [ ] ear pain, [ ] ringing in the ears, [ ] vertigo, [ ] sinus pain, [ ] voice changes  Neck: [ ] None, [ ] neck stiffness  Respiratory: [ ] None, [ ] cough, [ ] difficulty breathing  Cardiovascular: [ ] None, [ ] calf cramps, [ ] chest pain, [ ] leg pain, [ ] swelling, [ ] rapid heart rate, [ ] shortness of breath  Gastrointestinal: [ ] None, [ ] abdominal pain, [ ] nausea, [ ] vomiting  Musculoskeletal: [ ] None, [ ] back pain, [ ] joint pain, [ ] joint stiffness, [ ] leg cramps, [ ] muscle atrophy, [ ] muscle cramps, [ ] muscle weakness, [ ] swelling of extremities  Neurological: [ ] None, [ ] Dizziness, [ ] decreased memory, [ ] fainting, [ ] focal neurological symptoms, [ ] headaches, [ ] incontinence of stool, [ ] incontinence of urine, [ ] loss of consciousness, [ ] numbness, [ ] seizures, [ ] spinning sensation, [ ] stroke, [ ] trouble walking, [ ] unsteadiness, [ ] visual changes, [ ] weakness  Psychiatric: [ ] None,  [ ] depression, [ ] anxiety, [ ] hallucinations, [ ] inability to concentrate, [ ] mood changes, [ ] panic attacks  Hematology: [ ] None,  [ ] blood clots, [ ] spontaneous bleeding      Objective:   Vital Signs Last 24 Hrs  T(C): 37.1 (16 May 2019 15:47), Max: 37.1 (16 May 2019 05:03)  T(F): 98.8 (16 May 2019 15:47), Max: 98.8 (16 May 2019 15:47)  HR: 92 (16 May 2019 15:47) (87 - 107)  BP: 146/72 (16 May 2019 15:47) (120/65 - 184/81)  BP(mean): --  RR: 17 (16 May 2019 15:47) (17 - 18)  SpO2: 96% (16 May 2019 15:47) (94% - 99%)    General Exam:   General appearance: No acute distress                 Cardiovascular: Pedal dorsalis pulses intact bilaterally    Neurological Exam:  Mental Status: Orientated to self, date and place.  Attention intact.  No dysarthria, aphasia or neglect.  Knowledge intact.  Registration intact.  Short and long term memory grossly intact.      Cranial Nerves: CN I - not tested.  PERRL, EOMI, VFF, no nystagmus or diplopia.  No APD.  Fundi not visualized bilaterally.  CN V1-3 intact to light touch and pinprick.  No facial asymmetry.  Hearing intact to finger rub bilaterally.  Tongue, uvula and palate midline.  Sternocleidomastoid and Trapezius intact bilaterally.    Motor:   Tone: normal.                  Strength: intact throughout  Pronator drift: none                 Dysmeria: None to finger-nose-finger or heel-shin-heel  No truncal ataxia.    Tremor: No resting, postural or action tremor.  No myoclonus.    Sensation: intact to light touch, pinprick, vibration and proprioception    Deep Tendon Reflexes: 1+ bilateral biceps, triceps, brachioradialis, knee and ankle  Toes flexor bilaterally    Gait: normal and stable.      Other:    05-16    139  |  106  |  14  ----------------------------<  113<H>  3.9   |  27  |  0.82    Ca    8.9      16 May 2019 06:35  Phos  3.0     05-16  Mg     2.3     05-16 05-16    139  |  106  |  14  ----------------------------<  113<H>  3.9   |  27  |  0.82    Ca    8.9      16 May 2019 06:35  Phos  3.0     05-16  Mg     2.3     05-16          Radiology    EKG:  tele:  TTE:  EEG:                 Please contact the Neurology consult service with any questions.    Mane Marcano MD  Neurology Attending  [facility] Neurology Follow up note    Name  JUDE BILLY    HPI:  84/F form home lives with daughter independent at baseline with PMH of dementia, HTN, HLD presented with dizziness and upper abdominal pain. Patient reports that today she started having dizziness which she describes as room spinning sensation associated with nausea, worsening with head movement, improving with lying down and closed eyes, she had similar episode in past for which she was told that its form ear problems and which improved with medications.  Denies episode of vomiting, fall, ear pain or discharge. She also mentions epigastric colicky pain which is 8/10 in intensity, no aggravating or reliving factors. Denies vomiting, diarrhea, SOB, SCHWARTZ, palpitations, headache, cough, wheezing, joint pain or swelling, fever, chills. (09 May 2019 22:33)    Interval History -  Interview is conducted in [place]ian. The patient reports slightly improved dizziness, but does not benefit from an attempted Epley maneuver at bedside.    MEDICATIONS  (STANDING):  aspirin enteric coated 81 milliGRAM(s) Oral daily  atorvastatin 80 milliGRAM(s) Oral at bedtime  cefTRIAXone   IVPB 1 Gram(s) IV Intermittent every 24 hours  cefTRIAXone   IVPB      cholecalciferol 1000 Unit(s) Oral daily  donepezil 10 milliGRAM(s) Oral at bedtime  enoxaparin Injectable 40 milliGRAM(s) SubCutaneous daily  ergocalciferol 16005 Unit(s) Oral <User Schedule>  LORazepam     Tablet 0.5 milliGRAM(s) Oral every 8 hours  losartan 50 milliGRAM(s) Oral two times a day  memantine 10 milliGRAM(s) Oral daily  pantoprazole    Tablet 40 milliGRAM(s) Oral before breakfast  promethazine 25 milliGRAM(s) Oral two times a day    MEDICATIONS  (PRN):  acetaminophen   Tablet .. 650 milliGRAM(s) Oral every 6 hours PRN Temp greater or equal to 38C (100.4F), Mild Pain (1 - 3)  aluminum hydroxide/magnesium hydroxide/simethicone Suspension 30 milliLiter(s) Oral every 6 hours PRN Dyspepsia  meclizine 50 milliGRAM(s) Oral every 6 hours PRN Dizziness  simethicone 80 milliGRAM(s) Chew four times a day PRN Gas  sodium chloride 0.65% Nasal 1 Spray(s) Both Nostrils three times a day PRN Nasal Congestion    Allergies  No Known Allergies    Review of Systems: Fourteen systems reviewed and negative except as in HPI / Interval History.      Objective:   Vital Signs Last 24 Hrs  T(C): 37.1 (16 May 2019 15:47), Max: 37.1 (16 May 2019 05:03)  T(F): 98.8 (16 May 2019 15:47), Max: 98.8 (16 May 2019 15:47)  HR: 92 (16 May 2019 15:47) (87 - 107)  BP: 146/72 (16 May 2019 15:47) (120/65 - 184/81)  RR: 17 (16 May 2019 15:47) (17 - 18)  SpO2: 96% (16 May 2019 15:47) (94% - 99%)    General Exam:  General: No acute distress  Respiratory: CTAB/l.  No crackles, rhonchi, or wheezes.  Cardiovascular: RRR, No murmurs, Full b/l radial and pedal pulses    Neurological Exam:  General / Mental Status: Oriented to person, place, and time.  No dysarthria or aphasia present.  Naming and repetition intact.  Cranial Nerves: PERRLA, EOMI x 2, VFF x 4, No nystagmus or diplopia, Optic discs normal b/l.  B/l V1-V3 equal to light touch and pinprick.  Symmetric facial movement and palate elevation.  B/l hearing equal to finger rub.  Negative Shania-Hallpike maneuver b/l.  5/5 strength with b/l sternocleidomastoid and trapezius.  Midline tongue protrusion with no atrophy or fasciculations.  Motor: Normal bulk and tone in all four extremities.  5/5 strength throughout all four extremities.  No downward drift, rigidity, spasticity, or tremors in any of the four extremities.  Sensation: Intact to light touch and pinprick in all four extremities.  Coordination: No dysmetria with b/l finger-to-nose and heel raise tests.  Normal rapid alternating movements b/l.  Reflexes: 2+ and symmetric at b/l biceps, triceps, brachioradialis, patellae, and ankles.  Toes flexor b/l.  Gait and Romberg testing deferred per patient request.      Labs:    05-16    139  |  106  |  14  ----------------------------<  113<H>  3.9   |  27  |  0.82    Ca    8.9      16 May 2019 06:35  Phos  3.0     05-16  Mg     2.3     05-16      Lipid Profile (05.10.19 @ 06:04)    Total Cholesterol/HDL Ratio Measurement: 2.8 RATIO    Cholesterol, Serum: 198 mg/dL    Triglycerides, Serum: 135 mg/dL    HDL Cholesterol, Serum: 71 mg/dL    Direct LDL: 100 mg/dL    Hemoglobin A1C, Whole Blood (05.10.19 @ 09:22)    Hemoglobin A1C, Whole Blood: 5.8%    Thyroid Stimulating Hormone, Serum (05.10.19 @ 06:04)    Thyroid Stimulating Hormone, Serum: 1.07 uU/mL    Vitamin B12, Serum (05.10.19 @ 09:18)    Vitamin B12, Serum: 625 pg/mL    Folate, Serum (05.10.19 @ 09:18)    Folate, Serum: 17.1 ng/mL      Neuroimaging:    CT Head (5/9/19):  - No acute intracranial abnormality  - Chronic microvascular disease    Carotid Ultrasound (5/10/19):  - No significant stenosis  - Irregular pulses noted    MRI Brain (5/12/19):  - No acute intracranial abnormality  - Chronic microvascular disease  - Age-related atrophy      Assessment:  84 RHF with likely peripheral vertigo secondary to inner ear dysfunction, without evidence of posterior circulation stroke on MRI Brain.      Recommendations:    - Continue meclizine 50mg Q6H to manage symptoms    - Continue lorazepam 0.5mg BID to help manage symptoms    - PT/OT for vestibular therapy    - Set up otolaryngology outpatient follow-up    - Consider providing handout on self-administration of Epley maneuver at home      Please contact the Neurology consult service with any questions.    Mane Marcano MD  Neurology Attending  [facility]

## 2019-05-16 NOTE — PROGRESS NOTE ADULT - SUBJECTIVE AND OBJECTIVE BOX
CHIEF COMPLAINT:Patient is a 84y old  Female who presents with a chief complaint of dizziness and upper abdominal pain.Pt appears comfortable.    	  REVIEW OF SYSTEMS:  CONSTITUTIONAL: No fever, weight loss, or fatigue  EYES: No eye pain, visual disturbances, or discharge  ENT:  No difficulty hearing, tinnitus, vertigo; No sinus or throat pain  NECK: No pain or stiffness  RESPIRATORY: No cough, wheezing, chills or hemoptysis; No Shortness of Breath  CARDIOVASCULAR: No chest pain, palpitations, passing out, dizziness, or leg swelling  GASTROINTESTINAL: No abdominal or epigastric pain. No nausea, vomiting, or hematemesis; No diarrhea or constipation. No melena or hematochezia.  GENITOURINARY: No dysuria, frequency, hematuria, or incontinence  NEUROLOGICAL: No headaches, memory loss, loss of strength, numbness, or tremors  SKIN: No itching, burning, rashes, or lesions   LYMPH Nodes: No enlarged glands  ENDOCRINE: No heat or cold intolerance; No hair loss  MUSCULOSKELETAL: No joint pain or swelling; No muscle, back, or extremity pain  PSYCHIATRIC: No depression, anxiety, mood swings, or difficulty sleeping  HEME/LYMPH: No easy bruising, or bleeding gums  ALLERGY AND IMMUNOLOGIC: No hives or eczema	    PHYSICAL EXAM:  T(C): 37.1 (05-16-19 @ 05:03), Max: 37.1 (05-16-19 @ 05:03)  HR: 107 (05-16-19 @ 05:03) (87 - 107)  BP: 136/62 (05-16-19 @ 05:03) (136/62 - 184/81)  RR: 18 (05-16-19 @ 05:03) (18 - 18)  SpO2: 94% (05-16-19 @ 05:03) (94% - 99%)    I&O's Summary    15 May 2019 07:01  -  16 May 2019 07:00  --------------------------------------------------------  IN: 300 mL / OUT: 400 mL / NET: -100 mL        Appearance: Normal	  HEENT:   Normal oral mucosa, PERRL, EOMI	  Lymphatic: No lymphadenopathy  Cardiovascular: Normal S1 S2, No JVD, No murmurs, No edema  Respiratory: Lungs clear to auscultation	  Psychiatry: A & O x 3, Mood & affect appropriate  Gastrointestinal:  Soft, Non-tender, + BS	  Skin: No rashes, No ecchymoses, No cyanosis	  Neurologic: Non-focal  Extremities: Normal range of motion, No clubbing, cyanosis or edema  Vascular: Peripheral pulses palpable 2+ bilaterally    MEDICATIONS  (STANDING):  aspirin enteric coated 81 milliGRAM(s) Oral daily  atorvastatin 80 milliGRAM(s) Oral at bedtime  cefTRIAXone   IVPB 1 Gram(s) IV Intermittent every 24 hours  cefTRIAXone   IVPB      cholecalciferol 1000 Unit(s) Oral daily  donepezil 10 milliGRAM(s) Oral at bedtime  enoxaparin Injectable 40 milliGRAM(s) SubCutaneous daily  ergocalciferol 39715 Unit(s) Oral <User Schedule>  LORazepam     Tablet 0.5 milliGRAM(s) Oral two times a day  losartan 50 milliGRAM(s) Oral two times a day  memantine 10 milliGRAM(s) Oral daily  pantoprazole    Tablet 40 milliGRAM(s) Oral before breakfast      LABS:	 	                        12.8   10.54 )-----------( 270      ( 16 May 2019 06:35 )             39.5     05-16    139  |  106  |  14  ----------------------------<  113<H>  3.9   |  27  |  0.82    Ca    8.9      16 May 2019 06:35  Phos  3.0     05-16  Mg     2.3     05-16        Lipid Profile: Cholesterol 198    HDL 71      HgA1c: Hemoglobin A1C, Whole Blood: 5.8 % (05-10 @ 09:22)    TSH: Thyroid Stimulating Hormone, Serum: 1.07 uU/mL (05-10 @ 06:04)

## 2019-05-16 NOTE — PROGRESS NOTE ADULT - PROBLEM SELECTOR PLAN 1
Presented with room spinning sensation, worsening with head movement and improvement with rest and closing eyes. Nystagmus negative and orthostatic negative on lying down and sitting. DDx Central vs veritgo. CT head: Mild chronic microvascular changes without evidence of an acute transcortical infarction or hemorrhage. Cardiac enzymes non-significant. EKG - NSR no stt wave changes. CD showed no hemodynamically significant internal carotid artery stenosis by velocity criteria. Irregular arterial pulses are noted.  -Lactate normalized to 1.6  -C/w aspirin, atorvastatin, and meclizine  -C/w tele monitoring and neruo checks   -UCx negative  -BCx negative to final report  -Orthostatic pressure was positive 5/13 AM  -MR brain neg  -US renal only showed L renal showed without acute pathology  -Dizziness likely 2/2 peripheral vertigo. Meclizine dosage increased to 50 mg once a day with persistnet and significant dizziness.  -Meclizine will be switched to PRN basis when dizziness improves  -TTE and Stress NL  -Pt to f/u with OP vestibular rehab  -F/u PT  -Pt now on standing low dose Ativan  -Pt c/o persistent significant dizziness again this AM  -Neuro Dr. Marcano follow up noted

## 2019-05-16 NOTE — PROGRESS NOTE ADULT - SUBJECTIVE AND OBJECTIVE BOX
Patient is a 84y old  Female who presents with a chief complaint of dizziness and upper abdominal pain (15 May 2019 15:00)    pt seen in tele [ x ], reg med floor [   ], bed [ ], chair at bedside [ x  ], a+o x3 [  ], lethargic [  ],  nad [ x ]    still with dizziness, also with complaints of ruq pain moving to epigastrium and burping        Allergies    No Known Allergies        Vitals    T(F): 98.7 (05-16-19 @ 05:03), Max: 98.7 (05-16-19 @ 05:03)  HR: 107 (05-16-19 @ 05:03) (86 - 107)  BP: 136/62 (05-16-19 @ 05:03) (136/62 - 184/81)  RR: 18 (05-16-19 @ 05:03) (18 - 18)  SpO2: 94% (05-16-19 @ 05:03) (94% - 99%)  Wt(kg): --  CAPILLARY BLOOD GLUCOSE          Labs                          12.8   10.54 )-----------( 270      ( 16 May 2019 06:35 )             39.5       05-16    139  |  106  |  14  ----------------------------<  113<H>  3.9   |  27  |  0.82    Ca    8.9      16 May 2019 06:35  Phos  3.0     05-16  Mg     2.3     05-16              .Blood  05-10 @ 19:03   No growth at 5 days.  --  --      .Urine  05-10 @ 00:46   <10,000 CFU/mL Normal Urogenital Laura  --  --          Radiology Results      Meds    MEDICATIONS  (STANDING):  aspirin enteric coated 81 milliGRAM(s) Oral daily  atorvastatin 80 milliGRAM(s) Oral at bedtime  cefTRIAXone   IVPB 1 Gram(s) IV Intermittent every 24 hours  cefTRIAXone   IVPB      cholecalciferol 1000 Unit(s) Oral daily  donepezil 10 milliGRAM(s) Oral at bedtime  enoxaparin Injectable 40 milliGRAM(s) SubCutaneous daily  ergocalciferol 87337 Unit(s) Oral <User Schedule>  LORazepam     Tablet 0.5 milliGRAM(s) Oral two times a day  losartan 50 milliGRAM(s) Oral two times a day  meclizine 50 milliGRAM(s) Oral every 6 hours  memantine 10 milliGRAM(s) Oral daily  pantoprazole    Tablet 40 milliGRAM(s) Oral before breakfast      MEDICATIONS  (PRN):  acetaminophen   Tablet .. 650 milliGRAM(s) Oral every 6 hours PRN Temp greater or equal to 38C (100.4F), Mild Pain (1 - 3)  aluminum hydroxide/magnesium hydroxide/simethicone Suspension 30 milliLiter(s) Oral every 6 hours PRN Dyspepsia  simethicone 80 milliGRAM(s) Chew four times a day PRN Gas  sodium chloride 0.65% Nasal 1 Spray(s) Both Nostrils three times a day PRN Nasal Congestion      Physical Exam      Neuro :  no focal deficits except b/l nystagmus   Respiratory: CTA B/L  CV: RRR, S1S2, no murmurs,   Abdominal: Soft, NT, ND +BS,  Extremities: No edema, + peripheral pulses      ASSESSMENT    uti  Dizziness and giddiness  abd pain   vertigo   renal lesion   h/o HTN  cholecystectomy      PLAN        neuro f/u  increase meclizine to 50mg qid  increased ativan to 0.5mg bid   outpt vestibular therapy   oupt otolaryngology f/u   MRI brain noted wnl   cont statin, asa, meclizine   cont tele   ua +ve   ceftriaxone   ct head noted   ct abd noted   blood cx neg noted above  urine cx wnl  echo noted EF > 55%   carotid doppler noted -ve  abd us noted : b/l renal cyst and hepatic steatosis  cardio f/u  d/c tele  stress test neg noted above  PT cons   simethicone for abd pain and gas  gi cons noted  egd as outpt   cont anti reflux   continue current meds Patient is a 84y old  Female who presents with a chief complaint of dizziness and upper abdominal pain (15 May 2019 15:00)    pt seen in tele [ x ], reg med floor [   ], bed [ ], chair at bedside [ x  ], a+o x3 [  ], lethargic [  ],  nad [ x ]    still with dizziness, but improving. GI symptoms have imporved. c/o left great toe pain     Allergies    No Known Allergies        Vitals    T(F): 98.7 (05-16-19 @ 05:03), Max: 98.7 (05-16-19 @ 05:03)  HR: 107 (05-16-19 @ 05:03) (86 - 107)  BP: 136/62 (05-16-19 @ 05:03) (136/62 - 184/81)  RR: 18 (05-16-19 @ 05:03) (18 - 18)  SpO2: 94% (05-16-19 @ 05:03) (94% - 99%)  Wt(kg): --  CAPILLARY BLOOD GLUCOSE          Labs                          12.8   10.54 )-----------( 270      ( 16 May 2019 06:35 )             39.5       05-16    139  |  106  |  14  ----------------------------<  113<H>  3.9   |  27  |  0.82    Ca    8.9      16 May 2019 06:35  Phos  3.0     05-16  Mg     2.3     05-16              .Blood  05-10 @ 19:03   No growth at 5 days.  --  --      .Urine  05-10 @ 00:46   <10,000 CFU/mL Normal Urogenital Laura  --  --          Radiology Results      Meds    MEDICATIONS  (STANDING):  aspirin enteric coated 81 milliGRAM(s) Oral daily  atorvastatin 80 milliGRAM(s) Oral at bedtime  cefTRIAXone   IVPB 1 Gram(s) IV Intermittent every 24 hours  cefTRIAXone   IVPB      cholecalciferol 1000 Unit(s) Oral daily  donepezil 10 milliGRAM(s) Oral at bedtime  enoxaparin Injectable 40 milliGRAM(s) SubCutaneous daily  ergocalciferol 78478 Unit(s) Oral <User Schedule>  LORazepam     Tablet 0.5 milliGRAM(s) Oral two times a day  losartan 50 milliGRAM(s) Oral two times a day  meclizine 50 milliGRAM(s) Oral every 6 hours  memantine 10 milliGRAM(s) Oral daily  pantoprazole    Tablet 40 milliGRAM(s) Oral before breakfast      MEDICATIONS  (PRN):  acetaminophen   Tablet .. 650 milliGRAM(s) Oral every 6 hours PRN Temp greater or equal to 38C (100.4F), Mild Pain (1 - 3)  aluminum hydroxide/magnesium hydroxide/simethicone Suspension 30 milliLiter(s) Oral every 6 hours PRN Dyspepsia  simethicone 80 milliGRAM(s) Chew four times a day PRN Gas  sodium chloride 0.65% Nasal 1 Spray(s) Both Nostrils three times a day PRN Nasal Congestion      Physical Exam      Neuro :  no focal deficits except b/l nystagmus   Respiratory: CTA B/L  CV: RRR, S1S2, no murmurs,   Abdominal: Soft, NT, ND +BS,  Extremities: No edema, + peripheral pulses, + tenderness and swelling left toe at the mtp joint       ASSESSMENT    uti  Dizziness and giddiness  abd pain   ? gout arthritis left great toe   vertigo   renal lesion   h/o HTN  cholecystectomy      PLAN        neuro f/u  increase meclizine to 50mg qid  increased ativan to 0.5mg bid   add promethazine 25mg bid   outpt vestibular therapy   oupt otolaryngology f/u   MRI brain noted wnl   cont statin, asa, meclizine   cont tele   ua +ve   ceftriaxone   ct head noted   ct abd noted   blood cx neg noted above  urine cx wnl  echo noted EF > 55%   carotid doppler noted -ve  abd us noted : b/l renal cyst and hepatic steatosis  cardio f/u  d/c tele  stress test neg noted above  PT cons   simethicone for abd pain and gas  gi cons noted  egd as outpt   cont anti reflux   solumedrol 40mg iv x 1 dose today for ? gout   will start prednisone 40mg qd tomorrow if needed   continue current meds

## 2019-05-16 NOTE — PROGRESS NOTE ADULT - ASSESSMENT
84 yr old Female form home lives with daughter independent at baseline with PMHX of dementia, HTN, HLD presented with dizziness and upper abdominal pain,bradycardia.   1.PT.  2.Lipid d/o-statin.  3.Abdominal pain-GI rec EGD as outpatient.  4.HTN-Cozaar 50mg bid  5.Prediabestes-diet.  6.GI and DVT prophylaxis.

## 2019-05-16 NOTE — PROGRESS NOTE ADULT - SUBJECTIVE AND OBJECTIVE BOX
PGY 1 Note discussed with supervising resident and primary attending    Patient is a 84y old  Female who presents with a chief complaint of dizziness and upper abdominal pain (16 May 2019 10:31)      INTERVAL HPI/OVERNIGHT EVENTS: offers no new complaints; current symptoms resolving    MEDICATIONS  (STANDING):  aspirin enteric coated 81 milliGRAM(s) Oral daily  atorvastatin 80 milliGRAM(s) Oral at bedtime  cefTRIAXone   IVPB 1 Gram(s) IV Intermittent every 24 hours  cefTRIAXone   IVPB      cholecalciferol 1000 Unit(s) Oral daily  donepezil 10 milliGRAM(s) Oral at bedtime  enoxaparin Injectable 40 milliGRAM(s) SubCutaneous daily  ergocalciferol 30866 Unit(s) Oral <User Schedule>  LORazepam     Tablet 0.5 milliGRAM(s) Oral two times a day  losartan 50 milliGRAM(s) Oral two times a day  meclizine 50 milliGRAM(s) Oral every 6 hours  memantine 10 milliGRAM(s) Oral daily  pantoprazole    Tablet 40 milliGRAM(s) Oral before breakfast    MEDICATIONS  (PRN):  acetaminophen   Tablet .. 650 milliGRAM(s) Oral every 6 hours PRN Temp greater or equal to 38C (100.4F), Mild Pain (1 - 3)  aluminum hydroxide/magnesium hydroxide/simethicone Suspension 30 milliLiter(s) Oral every 6 hours PRN Dyspepsia  simethicone 80 milliGRAM(s) Chew four times a day PRN Gas  sodium chloride 0.65% Nasal 1 Spray(s) Both Nostrils three times a day PRN Nasal Congestion      __________________________________________________  REVIEW OF SYSTEMS:    CONSTITUTIONAL: No fever,   EYES: no acute visual disturbances  NECK: No pain or stiffness  RESPIRATORY: No cough; No shortness of breath  CARDIOVASCULAR: No chest pain, no palpitations  GASTROINTESTINAL: No pain. No nausea or vomiting; No diarrhea   NEUROLOGICAL: No headache or numbness, no tremors  MUSCULOSKELETAL: No joint pain, no muscle pain  GENITOURINARY: no dysuria, no frequency, no hesitancy  PSYCHIATRY: no depression , no anxiety  ALL OTHER  ROS negative        Vital Signs Last 24 Hrs  T(C): 37.1 (16 May 2019 05:03), Max: 37.1 (16 May 2019 05:03)  T(F): 98.7 (16 May 2019 05:03), Max: 98.7 (16 May 2019 05:03)  HR: 107 (16 May 2019 05:03) (87 - 107)  BP: 136/62 (16 May 2019 05:03) (136/62 - 184/81)  BP(mean): --  RR: 18 (16 May 2019 05:03) (18 - 18)  SpO2: 94% (16 May 2019 05:03) (94% - 99%)    ________________________________________________  PHYSICAL EXAM:  GENERAL: NAD  HEENT: Normocephalic;  conjunctivae and sclerae clear; moist mucous membranes;   NECK : supple  CHEST/LUNG: Clear to auscultation bilaterally with good air entry   HEART: S1 S2  regular; no murmurs, gallops or rubs  ABDOMEN: Soft, Nontender, Nondistended; Bowel sounds present  EXTREMITIES: no cyanosis; no edema; no calf tenderness  SKIN: warm and dry; no rash  NERVOUS SYSTEM:  Awake and alert; Oriented  to place, person and time ; no new deficits    _________________________________________________  LABS:                        12.8   10.54 )-----------( 270      ( 16 May 2019 06:35 )             39.5     05-16    139  |  106  |  14  ----------------------------<  113<H>  3.9   |  27  |  0.82    Ca    8.9      16 May 2019 06:35  Phos  3.0     05-16  Mg     2.3     05-16          CAPILLARY BLOOD GLUCOSE            RADIOLOGY & ADDITIONAL TESTS:    Imaging Personally Reviewed:  YES/NO    Consultant(s) Notes Reviewed:   YES/ No    Care Discussed with Consultants :     Plan of care was discussed with patient and /or primary care giver; all questions and concerns were addressed and care was aligned with patient's wishes.

## 2019-05-17 RX ORDER — BENZOCAINE AND MENTHOL 5; 1 G/100ML; G/100ML
1 LIQUID ORAL THREE TIMES A DAY
Refills: 0 | Status: DISCONTINUED | OUTPATIENT
Start: 2019-05-17 | End: 2019-05-19

## 2019-05-17 RX ADMIN — Medication 40 MILLIGRAM(S): at 21:00

## 2019-05-17 RX ADMIN — Medication 0.5 MILLIGRAM(S): at 21:38

## 2019-05-17 RX ADMIN — Medication 25 MILLIGRAM(S): at 17:18

## 2019-05-17 RX ADMIN — DONEPEZIL HYDROCHLORIDE 10 MILLIGRAM(S): 10 TABLET, FILM COATED ORAL at 21:39

## 2019-05-17 RX ADMIN — CEFTRIAXONE 100 GRAM(S): 500 INJECTION, POWDER, FOR SOLUTION INTRAMUSCULAR; INTRAVENOUS at 11:56

## 2019-05-17 RX ADMIN — Medication 0.5 MILLIGRAM(S): at 13:07

## 2019-05-17 RX ADMIN — SIMETHICONE 80 MILLIGRAM(S): 80 TABLET, CHEWABLE ORAL at 11:57

## 2019-05-17 RX ADMIN — LOSARTAN POTASSIUM 50 MILLIGRAM(S): 100 TABLET, FILM COATED ORAL at 17:18

## 2019-05-17 RX ADMIN — Medication 0.5 MILLIGRAM(S): at 05:39

## 2019-05-17 RX ADMIN — LOSARTAN POTASSIUM 50 MILLIGRAM(S): 100 TABLET, FILM COATED ORAL at 05:39

## 2019-05-17 RX ADMIN — ATORVASTATIN CALCIUM 80 MILLIGRAM(S): 80 TABLET, FILM COATED ORAL at 21:39

## 2019-05-17 RX ADMIN — Medication 1000 UNIT(S): at 11:54

## 2019-05-17 RX ADMIN — ENOXAPARIN SODIUM 40 MILLIGRAM(S): 100 INJECTION SUBCUTANEOUS at 11:55

## 2019-05-17 RX ADMIN — MEMANTINE HYDROCHLORIDE 10 MILLIGRAM(S): 10 TABLET ORAL at 11:54

## 2019-05-17 RX ADMIN — PANTOPRAZOLE SODIUM 40 MILLIGRAM(S): 20 TABLET, DELAYED RELEASE ORAL at 05:39

## 2019-05-17 RX ADMIN — Medication 25 MILLIGRAM(S): at 05:39

## 2019-05-17 RX ADMIN — Medication 81 MILLIGRAM(S): at 11:55

## 2019-05-17 NOTE — PROGRESS NOTE ADULT - SUBJECTIVE AND OBJECTIVE BOX
PGY 1 Note discussed with supervising resident and primary attending    Patient is a 84y old  Female who presents with a chief complaint of dizziness and upper abdominal pain (17 May 2019 12:29)      INTERVAL HPI/OVERNIGHT EVENTS: offers no new complaints; current symptoms resolving    MEDICATIONS  (STANDING):  aspirin enteric coated 81 milliGRAM(s) Oral daily  atorvastatin 80 milliGRAM(s) Oral at bedtime  cefTRIAXone   IVPB 1 Gram(s) IV Intermittent every 24 hours  cefTRIAXone   IVPB      cholecalciferol 1000 Unit(s) Oral daily  donepezil 10 milliGRAM(s) Oral at bedtime  enoxaparin Injectable 40 milliGRAM(s) SubCutaneous daily  ergocalciferol 52212 Unit(s) Oral <User Schedule>  LORazepam     Tablet 0.5 milliGRAM(s) Oral every 8 hours  losartan 50 milliGRAM(s) Oral two times a day  memantine 10 milliGRAM(s) Oral daily  methylPREDNISolone sodium succinate Injectable 40 milliGRAM(s) IV Push once  pantoprazole    Tablet 40 milliGRAM(s) Oral before breakfast  promethazine 25 milliGRAM(s) Oral two times a day    MEDICATIONS  (PRN):  acetaminophen   Tablet .. 650 milliGRAM(s) Oral every 6 hours PRN Temp greater or equal to 38C (100.4F), Mild Pain (1 - 3)  aluminum hydroxide/magnesium hydroxide/simethicone Suspension 30 milliLiter(s) Oral every 6 hours PRN Dyspepsia  benzocaine 15 mG/menthol 3.6 mG Lozenge 1 Lozenge Oral three times a day PRN Sore Throat  meclizine 50 milliGRAM(s) Oral every 6 hours PRN Dizziness  simethicone 80 milliGRAM(s) Chew four times a day PRN Gas  sodium chloride 0.65% Nasal 1 Spray(s) Both Nostrils three times a day PRN Nasal Congestion      __________________________________________________  REVIEW OF SYSTEMS:    CONSTITUTIONAL: No fever,   EYES: no acute visual disturbances  NECK: No pain or stiffness  RESPIRATORY: No cough; No shortness of breath  CARDIOVASCULAR: No chest pain, no palpitations  GASTROINTESTINAL: No pain. No nausea or vomiting; No diarrhea   NEUROLOGICAL: No headache or numbness, no tremors  MUSCULOSKELETAL: No joint pain, no muscle pain  GENITOURINARY: no dysuria, no frequency, no hesitancy  PSYCHIATRY: no depression , no anxiety  ALL OTHER  ROS negative        Vital Signs Last 24 Hrs  T(C): 36.6 (17 May 2019 15:07), Max: 36.9 (16 May 2019 21:37)  T(F): 97.9 (17 May 2019 15:07), Max: 98.4 (16 May 2019 21:37)  HR: 78 (17 May 2019 17:42) (78 - 97)  BP: 130/66 (17 May 2019 17:42) (130/66 - 154/81)  BP(mean): --  RR: 18 (17 May 2019 15:07) (18 - 18)  SpO2: 98% (17 May 2019 15:07) (96% - 98%)    ________________________________________________  PHYSICAL EXAM:  GENERAL: NAD  HEENT: Normocephalic;  conjunctivae and sclerae clear; moist mucous membranes;   NECK : supple  CHEST/LUNG: Clear to auscultation bilaterally with good air entry   HEART: S1 S2  regular; no murmurs, gallops or rubs  ABDOMEN: Soft, Nontender, Nondistended; Bowel sounds present  EXTREMITIES: no cyanosis; no edema; no calf tenderness  SKIN: warm and dry; no rash  NERVOUS SYSTEM:  Awake and alert; Oriented  to place, person and time ; no new deficits    _________________________________________________  LABS:                        12.8   10.54 )-----------( 270      ( 16 May 2019 06:35 )             39.5     05-16    139  |  106  |  14  ----------------------------<  113<H>  3.9   |  27  |  0.82    Ca    8.9      16 May 2019 06:35  Phos  3.0     05-16  Mg     2.3     05-16          CAPILLARY BLOOD GLUCOSE            RADIOLOGY & ADDITIONAL TESTS:    Imaging Personally Reviewed:  YES/NO    Consultant(s) Notes Reviewed:   YES/ No    Care Discussed with Consultants :     Plan of care was discussed with patient and /or primary care giver; all questions and concerns were addressed and care was aligned with patient's wishes.

## 2019-05-17 NOTE — PROGRESS NOTE ADULT - ASSESSMENT
84/F form home lives with daughter independent at baseline with PMH of dementia, HTN, HLD presented with dizziness and upper abdominal pain. Patient reports that today she started having dizziness which she describes as room spinning sensation associated with nausea, worsening with head movement, improving with lying down and closed eyes, she had similar episode in past for which she was told that its form ear problems and which improved with medications.  Denies episode of vomiting, fall, ear pain or discharge. She also mentions epigastric colicky pain which is 8/10 in intensity, no aggravating or reliving factors. Denies vomiting, diarrhea, SOB, SCHWARTZ, palpitations, headache, cough, wheezing, joint pain or swelling, fever, chills.      ED course:  Vitals: 178/74, 72, 97.3, 17 (100)  Labs pertinent for WBC of 13.85  T1 of 0.054  UA negative   CT A/P: 3.8 cm indeterminate hypodense lesion in the left kidney with a Hounsfield unit of 28.  CT  head: Mild chronic microvascular changes without evidence of an acute transcortical infarction or hemorrhage.  S/p tylenol IV, maalox, valium 2 mg, Benadryl 25 mg, famotidine, meclizine, reglan given in ed       Gouty arthritis c/w solu medrol while in the hospital will change to oral prednisone on discharge

## 2019-05-17 NOTE — PROGRESS NOTE ADULT - SUBJECTIVE AND OBJECTIVE BOX
CHIEF COMPLAINT: Patient is a 84y old  Female who presents with a chief complaint of dizziness and upper abdominal pain. Pt appears comfortable.    	  REVIEW OF SYSTEMS:  CONSTITUTIONAL: No fever, weight loss, or fatigue  EYES: No eye pain, visual disturbances, or discharge  ENT:  No difficulty hearing, tinnitus, vertigo; No sinus or throat pain  NECK: No pain or stiffness  RESPIRATORY: No cough, wheezing, chills or hemoptysis; No Shortness of Breath  CARDIOVASCULAR: No chest pain, palpitations, passing out, dizziness, or leg swelling  GASTROINTESTINAL: No abdominal or epigastric pain. No nausea, vomiting, or hematemesis; No diarrhea or constipation. No melena or hematochezia.  GENITOURINARY: No dysuria, frequency, hematuria, or incontinence  NEUROLOGICAL: No headaches, memory loss, loss of strength, numbness, or tremors  SKIN: No itching, burning, rashes, or lesions   LYMPH Nodes: No enlarged glands  ENDOCRINE: No heat or cold intolerance; No hair loss  MUSCULOSKELETAL: No joint pain or swelling; No muscle, back, or extremity pain  PSYCHIATRIC: No depression, anxiety, mood swings, or difficulty sleeping  HEME/LYMPH: No easy bruising, or bleeding gums  ALLERGY AND IMMUNOLOGIC: No hives or eczema	      PHYSICAL EXAM:  T(C): 36.6 (05-17-19 @ 05:35), Max: 37.1 (05-16-19 @ 15:47)  HR: 82 (05-17-19 @ 05:35) (82 - 97)  BP: 136/64 (05-17-19 @ 05:35) (120/65 - 154/81)  RR: 18 (05-17-19 @ 05:35) (17 - 18)  SpO2: 98% (05-17-19 @ 05:35) (96% - 98%)      Appearance: Normal	  HEENT:   Normal oral mucosa, PERRL, EOMI	  Lymphatic: No lymphadenopathy  Cardiovascular: Normal S1 S2, No JVD, No murmurs, No edema  Respiratory: Lungs clear to auscultation	  Psychiatry: A & O x 3, Mood & affect appropriate  Gastrointestinal:  Soft, Non-tender, + BS	  Skin: No rashes, No ecchymoses, No cyanosis	  Neurologic: Non-focal  Extremities: Normal range of motion, No clubbing, cyanosis or edema  Vascular: Peripheral pulses palpable 2+ bilaterally    MEDICATIONS  (STANDING):  aspirin enteric coated 81 milliGRAM(s) Oral daily  atorvastatin 80 milliGRAM(s) Oral at bedtime  cefTRIAXone   IVPB 1 Gram(s) IV Intermittent every 24 hours  cefTRIAXone   IVPB      cholecalciferol 1000 Unit(s) Oral daily  donepezil 10 milliGRAM(s) Oral at bedtime  enoxaparin Injectable 40 milliGRAM(s) SubCutaneous daily  ergocalciferol 31305 Unit(s) Oral <User Schedule>  LORazepam     Tablet 0.5 milliGRAM(s) Oral every 8 hours  losartan 50 milliGRAM(s) Oral two times a day  memantine 10 milliGRAM(s) Oral daily  pantoprazole    Tablet 40 milliGRAM(s) Oral before breakfast  promethazine 25 milliGRAM(s) Oral two times a day      LABS:	 	                     12.8   10.54 )-----------( 270      ( 16 May 2019 06:35 )             39.5     05-16    139  |  106  |  14  ----------------------------<  113<H>  3.9   |  27  |  0.82    Ca    8.9      16 May 2019 06:35  Phos  3.0     05-16  Mg     2.3     05-16        Lipid Profile: Cholesterol 198    HDL 71      HgA1c: Hemoglobin A1C, Whole Blood: 5.8 % (05-10 @ 09:22)    TSH: Thyroid Stimulating Hormone, Serum: 1.07 uU/mL (05-10 @ 06:04)

## 2019-05-17 NOTE — PROGRESS NOTE ADULT - SUBJECTIVE AND OBJECTIVE BOX
Patient is a 84y old  Female who presents with a chief complaint of dizziness and upper abdominal pain (16 May 2019 16:20)    pt seen in tele [ x ], reg med floor [   ], bed [ ], chair at bedside [ x  ], a+o x3 [  ], lethargic [  ],  nad [ x ]    still with dizziness, but improving. GI symptoms have imporved. c/o left great toe pain         Allergies    No Known Allergies        Vitals    T(F): 97.8 (05-17-19 @ 05:35), Max: 98.8 (05-16-19 @ 15:47)  HR: 82 (05-17-19 @ 05:35) (82 - 97)  BP: 136/64 (05-17-19 @ 05:35) (120/65 - 154/81)  RR: 18 (05-17-19 @ 05:35) (17 - 18)  SpO2: 98% (05-17-19 @ 05:35) (96% - 98%)  Wt(kg): --  CAPILLARY BLOOD GLUCOSE          Labs                          12.8   10.54 )-----------( 270      ( 16 May 2019 06:35 )             39.5       05-16    139  |  106  |  14  ----------------------------<  113<H>  3.9   |  27  |  0.82    Ca    8.9      16 May 2019 06:35  Phos  3.0     05-16  Mg     2.3     05-16              .Blood  05-10 @ 19:03   No growth at 5 days.  --  --      .Urine  05-10 @ 00:46   <10,000 CFU/mL Normal Urogenital Laura  --  --          Radiology Results      Meds    MEDICATIONS  (STANDING):  aspirin enteric coated 81 milliGRAM(s) Oral daily  atorvastatin 80 milliGRAM(s) Oral at bedtime  cefTRIAXone   IVPB 1 Gram(s) IV Intermittent every 24 hours  cefTRIAXone   IVPB      cholecalciferol 1000 Unit(s) Oral daily  donepezil 10 milliGRAM(s) Oral at bedtime  enoxaparin Injectable 40 milliGRAM(s) SubCutaneous daily  ergocalciferol 72396 Unit(s) Oral <User Schedule>  LORazepam     Tablet 0.5 milliGRAM(s) Oral every 8 hours  losartan 50 milliGRAM(s) Oral two times a day  memantine 10 milliGRAM(s) Oral daily  pantoprazole    Tablet 40 milliGRAM(s) Oral before breakfast  promethazine 25 milliGRAM(s) Oral two times a day      MEDICATIONS  (PRN):  acetaminophen   Tablet .. 650 milliGRAM(s) Oral every 6 hours PRN Temp greater or equal to 38C (100.4F), Mild Pain (1 - 3)  aluminum hydroxide/magnesium hydroxide/simethicone Suspension 30 milliLiter(s) Oral every 6 hours PRN Dyspepsia  meclizine 50 milliGRAM(s) Oral every 6 hours PRN Dizziness  simethicone 80 milliGRAM(s) Chew four times a day PRN Gas  sodium chloride 0.65% Nasal 1 Spray(s) Both Nostrils three times a day PRN Nasal Congestion      Physical Exam      Neuro :  no focal deficits except b/l nystagmus   Respiratory: CTA B/L  CV: RRR, S1S2, no murmurs,   Abdominal: Soft, NT, ND +BS,  Extremities: No edema, + peripheral pulses, + tenderness and swelling left toe at the mtp joint       ASSESSMENT    uti  Dizziness and giddiness  abd pain   ? gout arthritis left great toe   vertigo   renal lesion   h/o HTN  cholecystectomy      PLAN        neuro f/u  meclizine to 50mg qid  ativan to 0.5mg bid   promethazine 25mg bid   outpt vestibular therapy   oupt otolaryngology f/u   MRI brain noted wnl   cont statin, asa, meclizine   cont tele   ua +ve   ceftriaxone   ct head noted   ct abd noted   blood cx neg noted above  urine cx wnl  echo noted EF > 55%   carotid doppler noted -ve  abd us noted : b/l renal cyst and hepatic steatosis  cardio f/u  d/c tele  stress test neg noted above  PT cons   simethicone for abd pain and gas  gi cons noted  egd as outpt   cont anti reflux   solumedrol 40mg iv x 1 dose today for ? gout   will start prednisone 40mg qd tomorrow if needed   continue current meds Patient is a 84y old  Female who presents with a chief complaint of dizziness and upper abdominal pain (16 May 2019 16:20)    pt seen in tele [ x ], reg med floor [   ], bed [ ], chair at bedside [ x  ], a+o x3 [  ], lethargic [  ],  nad [ x ]    still with dizziness, but improving. GI symptoms have improved. c/o left great toe pain         Allergies    No Known Allergies        Vitals    T(F): 97.8 (05-17-19 @ 05:35), Max: 98.8 (05-16-19 @ 15:47)  HR: 82 (05-17-19 @ 05:35) (82 - 97)  BP: 136/64 (05-17-19 @ 05:35) (120/65 - 154/81)  RR: 18 (05-17-19 @ 05:35) (17 - 18)  SpO2: 98% (05-17-19 @ 05:35) (96% - 98%)  Wt(kg): --  CAPILLARY BLOOD GLUCOSE          Labs                          12.8   10.54 )-----------( 270      ( 16 May 2019 06:35 )             39.5       05-16    139  |  106  |  14  ----------------------------<  113<H>  3.9   |  27  |  0.82    Ca    8.9      16 May 2019 06:35  Phos  3.0     05-16  Mg     2.3     05-16              .Blood  05-10 @ 19:03   No growth at 5 days.  --  --      .Urine  05-10 @ 00:46   <10,000 CFU/mL Normal Urogenital Laura  --  --          Radiology Results      Meds    MEDICATIONS  (STANDING):  aspirin enteric coated 81 milliGRAM(s) Oral daily  atorvastatin 80 milliGRAM(s) Oral at bedtime  cefTRIAXone   IVPB 1 Gram(s) IV Intermittent every 24 hours  cefTRIAXone   IVPB      cholecalciferol 1000 Unit(s) Oral daily  donepezil 10 milliGRAM(s) Oral at bedtime  enoxaparin Injectable 40 milliGRAM(s) SubCutaneous daily  ergocalciferol 45014 Unit(s) Oral <User Schedule>  LORazepam     Tablet 0.5 milliGRAM(s) Oral every 8 hours  losartan 50 milliGRAM(s) Oral two times a day  memantine 10 milliGRAM(s) Oral daily  pantoprazole    Tablet 40 milliGRAM(s) Oral before breakfast  promethazine 25 milliGRAM(s) Oral two times a day      MEDICATIONS  (PRN):  acetaminophen   Tablet .. 650 milliGRAM(s) Oral every 6 hours PRN Temp greater or equal to 38C (100.4F), Mild Pain (1 - 3)  aluminum hydroxide/magnesium hydroxide/simethicone Suspension 30 milliLiter(s) Oral every 6 hours PRN Dyspepsia  meclizine 50 milliGRAM(s) Oral every 6 hours PRN Dizziness  simethicone 80 milliGRAM(s) Chew four times a day PRN Gas  sodium chloride 0.65% Nasal 1 Spray(s) Both Nostrils three times a day PRN Nasal Congestion      Physical Exam      Neuro :  no focal deficits except b/l nystagmus   Respiratory: CTA B/L  CV: RRR, S1S2, no murmurs,   Abdominal: Soft, NT, ND +BS,  Extremities: No edema, + peripheral pulses, + tenderness and swelling left toe at the mtp joint       ASSESSMENT    uti  Dizziness and giddiness  abd pain   ? gout arthritis left great toe   vertigo   renal lesion   h/o HTN  cholecystectomy      PLAN        neuro f/u  meclizine to 50mg qid  ativan to 0.5mg bid   promethazine 25mg bid   outpt vestibular therapy   oupt otolaryngology f/u   MRI brain noted wnl   cont statin, asa, meclizine   cont tele   ua +ve   ceftriaxone   ct head noted   ct abd noted   blood cx neg noted above  urine cx wnl  echo noted EF > 55%   carotid doppler noted -ve  abd us noted : b/l renal cyst and hepatic steatosis  cardio f/u  d/c tele  stress test neg noted above  PT cons   simethicone for abd pain and gas  gi cons noted  egd as outpt   cont anti reflux   solumedrol 40mg iv x 1 dose again today   will start prednisone 40mg qd tomorrow    continue current meds

## 2019-05-18 ENCOUNTER — TRANSCRIPTION ENCOUNTER (OUTPATIENT)
Age: 84
End: 2019-05-18

## 2019-05-18 PROCEDURE — 99233 SBSQ HOSP IP/OBS HIGH 50: CPT

## 2019-05-18 RX ADMIN — ENOXAPARIN SODIUM 40 MILLIGRAM(S): 100 INJECTION SUBCUTANEOUS at 11:12

## 2019-05-18 RX ADMIN — MEMANTINE HYDROCHLORIDE 10 MILLIGRAM(S): 10 TABLET ORAL at 11:13

## 2019-05-18 RX ADMIN — Medication 1000 UNIT(S): at 11:12

## 2019-05-18 RX ADMIN — LOSARTAN POTASSIUM 50 MILLIGRAM(S): 100 TABLET, FILM COATED ORAL at 17:12

## 2019-05-18 RX ADMIN — Medication 0.5 MILLIGRAM(S): at 05:49

## 2019-05-18 RX ADMIN — CEFTRIAXONE 100 GRAM(S): 500 INJECTION, POWDER, FOR SOLUTION INTRAMUSCULAR; INTRAVENOUS at 11:11

## 2019-05-18 RX ADMIN — ATORVASTATIN CALCIUM 80 MILLIGRAM(S): 80 TABLET, FILM COATED ORAL at 22:53

## 2019-05-18 RX ADMIN — DONEPEZIL HYDROCHLORIDE 10 MILLIGRAM(S): 10 TABLET, FILM COATED ORAL at 22:53

## 2019-05-18 RX ADMIN — Medication 25 MILLIGRAM(S): at 05:49

## 2019-05-18 RX ADMIN — Medication 81 MILLIGRAM(S): at 11:11

## 2019-05-18 RX ADMIN — Medication 25 MILLIGRAM(S): at 17:11

## 2019-05-18 RX ADMIN — Medication 50 MILLIGRAM(S): at 11:12

## 2019-05-18 RX ADMIN — Medication 0.5 MILLIGRAM(S): at 14:59

## 2019-05-18 RX ADMIN — LOSARTAN POTASSIUM 50 MILLIGRAM(S): 100 TABLET, FILM COATED ORAL at 05:50

## 2019-05-18 RX ADMIN — Medication 50 MILLIGRAM(S): at 05:50

## 2019-05-18 RX ADMIN — ERGOCALCIFEROL 50000 UNIT(S): 1.25 CAPSULE ORAL at 14:59

## 2019-05-18 RX ADMIN — Medication 40 MILLIGRAM(S): at 19:18

## 2019-05-18 RX ADMIN — PANTOPRAZOLE SODIUM 40 MILLIGRAM(S): 20 TABLET, DELAYED RELEASE ORAL at 05:49

## 2019-05-18 RX ADMIN — Medication 0.5 MILLIGRAM(S): at 22:52

## 2019-05-18 NOTE — PROGRESS NOTE ADULT - PROBLEM SELECTOR PROBLEM 4
Abnormal CT scan

## 2019-05-18 NOTE — PROGRESS NOTE ADULT - SUBJECTIVE AND OBJECTIVE BOX
PGY 1 Note discussed with supervising resident and primary attending    Patient is a 84y old  Female who presents with a chief complaint of dizziness and upper abdominal pain (18 May 2019 11:35)      INTERVAL HPI/OVERNIGHT EVENTS: offers no new complaints; current symptoms resolving    MEDICATIONS  (STANDING):  aspirin enteric coated 81 milliGRAM(s) Oral daily  atorvastatin 80 milliGRAM(s) Oral at bedtime  cholecalciferol 1000 Unit(s) Oral daily  donepezil 10 milliGRAM(s) Oral at bedtime  enoxaparin Injectable 40 milliGRAM(s) SubCutaneous daily  ergocalciferol 15399 Unit(s) Oral <User Schedule>  LORazepam     Tablet 0.5 milliGRAM(s) Oral every 8 hours  losartan 50 milliGRAM(s) Oral two times a day  memantine 10 milliGRAM(s) Oral daily  methylPREDNISolone sodium succinate Injectable 40 milliGRAM(s) IV Push once  pantoprazole    Tablet 40 milliGRAM(s) Oral before breakfast  promethazine 25 milliGRAM(s) Oral two times a day    MEDICATIONS  (PRN):  acetaminophen   Tablet .. 650 milliGRAM(s) Oral every 6 hours PRN Temp greater or equal to 38C (100.4F), Mild Pain (1 - 3)  aluminum hydroxide/magnesium hydroxide/simethicone Suspension 30 milliLiter(s) Oral every 6 hours PRN Dyspepsia  benzocaine 15 mG/menthol 3.6 mG Lozenge 1 Lozenge Oral three times a day PRN Sore Throat  meclizine 50 milliGRAM(s) Oral every 6 hours PRN Dizziness  simethicone 80 milliGRAM(s) Chew four times a day PRN Gas  sodium chloride 0.65% Nasal 1 Spray(s) Both Nostrils three times a day PRN Nasal Congestion      __________________________________________________  REVIEW OF SYSTEMS:    CONSTITUTIONAL: No fever,   EYES: no acute visual disturbances  NECK: No pain or stiffness  RESPIRATORY: No cough; No shortness of breath  CARDIOVASCULAR: No chest pain, no palpitations  GASTROINTESTINAL: No pain. No nausea or vomiting; No diarrhea   NEUROLOGICAL: No headache or numbness, no tremors  MUSCULOSKELETAL: No joint pain, no muscle pain  GENITOURINARY: no dysuria, no frequency, no hesitancy  PSYCHIATRY: no depression , no anxiety  ALL OTHER  ROS negative        Vital Signs Last 24 Hrs  T(C): 36.2 (18 May 2019 13:35), Max: 36.6 (17 May 2019 22:01)  T(F): 97.1 (18 May 2019 13:35), Max: 97.9 (17 May 2019 22:01)  HR: 78 (18 May 2019 13:35) (75 - 78)  BP: 140/59 (18 May 2019 13:35) (130/66 - 154/72)  BP(mean): --  RR: 18 (18 May 2019 13:35) (17 - 18)  SpO2: 98% (18 May 2019 13:35) (98% - 100%)    ________________________________________________  PHYSICAL EXAM:  GENERAL: NAD  HEENT: Normocephalic;  conjunctivae and sclerae clear; moist mucous membranes;   NECK : supple  CHEST/LUNG: Clear to auscultation bilaterally with good air entry   HEART: S1 S2  regular; no murmurs, gallops or rubs  ABDOMEN: Soft, Nontender, Nondistended; Bowel sounds present  EXTREMITIES: no cyanosis; no edema; no calf tenderness  SKIN: warm and dry; no rash  NERVOUS SYSTEM:  Awake and alert; Oriented  to place, person and time ; no new deficits    _________________________________________________  LABS:              CAPILLARY BLOOD GLUCOSE            RADIOLOGY & ADDITIONAL TESTS:    Imaging Personally Reviewed:  YES/NO    Consultant(s) Notes Reviewed:   YES/ No    Care Discussed with Consultants :     Plan of care was discussed with patient and /or primary care giver; all questions and concerns were addressed and care was aligned with patient's wishes.

## 2019-05-18 NOTE — PROGRESS NOTE ADULT - SUBJECTIVE AND OBJECTIVE BOX
CHIEF COMPLAINT:Patient is a 84y old  Female who presents with a chief complaint of dizziness and upper abdominal pain.Pt appears comfortable.    	  REVIEW OF SYSTEMS:  CONSTITUTIONAL: No fever, weight loss, or fatigue  EYES: No eye pain, visual disturbances, or discharge  ENT:  No difficulty hearing, tinnitus, vertigo; No sinus or throat pain  NECK: No pain or stiffness  RESPIRATORY: No cough, wheezing, chills or hemoptysis; No Shortness of Breath  CARDIOVASCULAR: No chest pain, palpitations, passing out, dizziness, or leg swelling  GASTROINTESTINAL: No abdominal or epigastric pain. No nausea, vomiting, or hematemesis; No diarrhea or constipation. No melena or hematochezia.  GENITOURINARY: No dysuria, frequency, hematuria, or incontinence  NEUROLOGICAL: No headaches, memory loss, loss of strength, numbness, or tremors  SKIN: No itching, burning, rashes, or lesions   LYMPH Nodes: No enlarged glands  ENDOCRINE: No heat or cold intolerance; No hair loss  MUSCULOSKELETAL: No joint pain or swelling; No muscle, back, or extremity pain  PSYCHIATRIC: No depression, anxiety, mood swings, or difficulty sleeping  HEME/LYMPH: No easy bruising, or bleeding gums  ALLERGY AND IMMUNOLOGIC: No hives or eczema	        PHYSICAL EXAM:  T(C): 36.6 (05-18-19 @ 04:27), Max: 36.6 (05-17-19 @ 15:07)  HR: 78 (05-18-19 @ 04:27) (75 - 83)  BP: 134/77 (05-18-19 @ 04:27) (130/66 - 154/72)  RR: 17 (05-18-19 @ 04:27) (17 - 18)  SpO2: 99% (05-18-19 @ 04:27) (98% - 100%)        Appearance: Normal	  HEENT:   Normal oral mucosa, PERRL, EOMI	  Lymphatic: No lymphadenopathy  Cardiovascular: Normal S1 S2, No JVD, No murmurs, No edema  Respiratory: Lungs clear to auscultation	  Psychiatry: A & O x 3, Mood & affect appropriate  Gastrointestinal:  Soft, Non-tender, + BS	  Skin: No rashes, No ecchymoses, No cyanosis	  Neurologic: Non-focal  Extremities: Normal range of motion, No clubbing, cyanosis or edema  Vascular: Peripheral pulses palpable 2+ bilaterally    MEDICATIONS  (STANDING):  aspirin enteric coated 81 milliGRAM(s) Oral daily  atorvastatin 80 milliGRAM(s) Oral at bedtime  cefTRIAXone   IVPB 1 Gram(s) IV Intermittent every 24 hours  cefTRIAXone   IVPB      cholecalciferol 1000 Unit(s) Oral daily  donepezil 10 milliGRAM(s) Oral at bedtime  enoxaparin Injectable 40 milliGRAM(s) SubCutaneous daily  ergocalciferol 64891 Unit(s) Oral <User Schedule>  LORazepam     Tablet 0.5 milliGRAM(s) Oral every 8 hours  losartan 50 milliGRAM(s) Oral two times a day  memantine 10 milliGRAM(s) Oral daily  pantoprazole    Tablet 40 milliGRAM(s) Oral before breakfast  promethazine 25 milliGRAM(s) Oral two times a day        	  LABS:	 	    Lipid Profile: Cholesterol 198    HDL 71      HgA1c: Hemoglobin A1C, Whole Blood: 5.8 % (05-10 @ 09:22)    TSH: Thyroid Stimulating Hormone, Serum: 1.07 uU/mL (05-10 @ 06:04)

## 2019-05-18 NOTE — PROGRESS NOTE ADULT - PROBLEM SELECTOR PLAN 3
T1 0.054, T2 of 0.067 trend serial cardiac enzymes   EKG- NSR no stt wave changes  tele monitoring
T1 0.054, T2 of 0.067 trend serial cardiac enzymes   EKG- NSR no stt wave changes  d/c tele monitoring
T1 0.054, T2 of 0.067 trend serial cardiac enzymes   EKG- NSR no stt wave changes  tele monitoring
T1 0.054, T2 of 0.067 trend serial cardiac enzymes   EKG- NSR no stt wave changes  tele monitoring

## 2019-05-18 NOTE — PROGRESS NOTE ADULT - PROBLEM SELECTOR PLAN 2
epigastric abdominal pain - colicky with bloating   -CT abdomen suggestive of kidney lesion - will get renal USG   will start on maalox  -F/u abdominal US for persistent abdominal pain
Epigastric abdominal pain - colicky with bloating   -CT abdomen suggestive of kidney lesion - will get renal USG   -C/w Maalox  -US abdomen performed today only showed hepatic steatosis and bilateral renal cysts
Epigastric abdominal pain - colicky with bloating   -CT abdomen suggestive of kidney lesion - will get renal USG   -C/w Maalox  -US abdomen only showed hepatic steatosis and bilateral renal cysts  -Pt started on Simethicone given negative US abdomen for abdominal pain
epigastric abdominal pain - colicky with bloating   CT abdomen suggestive of kidney lesion - will get renal USG   will start on maalox
epigastric abdominal pain - colicky with bloating   CT abdomen suggestive of kidney lesion - will get renal USG   will start on maalox

## 2019-05-18 NOTE — PROGRESS NOTE ADULT - PROBLEM SELECTOR PLAN 5
on losartan - HCTZ, atenolol and amlodipine   will hold 2/2 permissive HTN
on losartan - HCTZ, atenolol and amlodipine
on losartan - HCTZ, atenolol and amlodipine  -SBP in 190s this AM. Losartan increased to 50 mg BID per Cardiology. New agent to be added tomorrow if BP still remains uncontrolled
on losartan - HCTZ, atenolol and amlodipine  -SBP in 190s this AM. Losartan increased to 50 mg BID per Cardiology. New agent to be added tomorrow if BP still remains uncontrolled  -BP better controlled today on adjusted BP regimen
on losartan - HCTZ, atenolol and amlodipine   will hold 2/2 permissive HTN

## 2019-05-18 NOTE — PROGRESS NOTE ADULT - PROBLEM SELECTOR PLAN 4
CT A/P: 3.8 cm indeterminate hypodense lesion in the left kidney with a Hounsfield unit of 28.  will get follow up USG renal
CT A/P: 3.8 cm indeterminate hypodense lesion in the left kidney with a Hounsfield unit of 28.  -US renal only showed L renal showed without acute pathology
CT A/P: 3.8 cm indeterminate hypodense lesion in the left kidney with a Hounsfield unit of 28.  will get follow up [facility]G renal

## 2019-05-18 NOTE — PROGRESS NOTE ADULT - PROBLEM SELECTOR PLAN 6
IMPROVE VTE Individual Risk Assessment          RISK                                                          Points  [  ] Previous VTE                                                3  [  ] Thrombophilia                                             2  [  ] Lower limb paralysis                                   2        (unable to hold up >15 seconds)    [  ] Current Cancer                                             2         (within 6 months)  [  ] Immobilization > 24 hrs                              1  [  ] ICU/CCU stay > 24 hours                             1  [  ] Age > 60                                                         1    IMPROVE VTE Score: 2  will start on lovenox

## 2019-05-18 NOTE — DISCHARGE NOTE NURSING/CASE MANAGEMENT/SOCIAL WORK - [email or web address]_GEN_ALL_CORE
You can access the [facility] Patient Portal, offered by [facility], by registering with the following website: [email or web address]

## 2019-05-18 NOTE — PROGRESS NOTE ADULT - SUBJECTIVE AND OBJECTIVE BOX
Patient continues to have room-spinning sensation when sitting forward, but no nystagmus or visual field deficit on exam, and negative Shania-Hallpike maneuver b/l    Recs:  - Continue meclizine and lorazepam as dosed  - PT/OT for vestibular therapy  - Outpatient otolaryngology follow-up      NOTE TO BE COMPLETED    Neurology Follow up note    Name  JUDE BILLY    HPI:  84/F form home lives with daughter independent at baseline with PMH of dementia, HTN, HLD presented with dizziness and upper abdominal pain. Patient reports that today she started having dizziness which she describes as room spinning sensation associated with nausea, worsening with head movement, improving with lying down and closed eyes, she had similar episode in past for which she was told that its form ear problems and which improved with medications.  Denies episode of vomiting, fall, ear pain or discharge. She also mentions epigastric colicky pain which is 8/10 in intensity, no aggravating or reliving factors. Denies vomiting, diarrhea, SOB, SCHWARTZ, palpitations, headache, cough, wheezing, joint pain or swelling, fever, chills. (09 May 2019 22:33)      Interval History -        Subjective:        MEDICATIONS  (STANDING):  aspirin enteric coated 81 milliGRAM(s) Oral daily  atorvastatin 80 milliGRAM(s) Oral at bedtime  cholecalciferol 1000 Unit(s) Oral daily  donepezil 10 milliGRAM(s) Oral at bedtime  enoxaparin Injectable 40 milliGRAM(s) SubCutaneous daily  ergocalciferol 22347 Unit(s) Oral <User Schedule>  LORazepam     Tablet 0.5 milliGRAM(s) Oral every 8 hours  losartan 50 milliGRAM(s) Oral two times a day  memantine 10 milliGRAM(s) Oral daily  methylPREDNISolone sodium succinate Injectable 40 milliGRAM(s) IV Push once  pantoprazole    Tablet 40 milliGRAM(s) Oral before breakfast  promethazine 25 milliGRAM(s) Oral two times a day    MEDICATIONS  (PRN):  acetaminophen   Tablet .. 650 milliGRAM(s) Oral every 6 hours PRN Temp greater or equal to 38C (100.4F), Mild Pain (1 - 3)  aluminum hydroxide/magnesium hydroxide/simethicone Suspension 30 milliLiter(s) Oral every 6 hours PRN Dyspepsia  benzocaine 15 mG/menthol 3.6 mG Lozenge 1 Lozenge Oral three times a day PRN Sore Throat  meclizine 50 milliGRAM(s) Oral every 6 hours PRN Dizziness  simethicone 80 milliGRAM(s) Chew four times a day PRN Gas  sodium chloride 0.65% Nasal 1 Spray(s) Both Nostrils three times a day PRN Nasal Congestion      Allergies    No Known Allergies    Intolerances        Review of Systems:  General: [ ] None, [ ] chills, [ ]fatigue, [ ] fevers  Skin: [ ] None, [ ] rash   HEENT: [ ] None, [ ] head injury, [ ] blurred vision, [ ] double vision, [ ] eye pain, [ ] visual loss, [ ] hearing loss, [ ] deafness, [ ] ear pain, [ ] ringing in the ears, [ ] vertigo, [ ] sinus pain, [ ] voice changes  Neck: [ ] None, [ ] neck stiffness  Respiratory: [ ] None, [ ] cough, [ ] difficulty breathing  Cardiovascular: [ ] None, [ ] calf cramps, [ ] chest pain, [ ] leg pain, [ ] swelling, [ ] rapid heart rate, [ ] shortness of breath  Gastrointestinal: [ ] None, [ ] abdominal pain, [ ] nausea, [ ] vomiting  Musculoskeletal: [ ] None, [ ] back pain, [ ] joint pain, [ ] joint stiffness, [ ] leg cramps, [ ] muscle atrophy, [ ] muscle cramps, [ ] muscle weakness, [ ] swelling of extremities  Neurological: [ ] None, [ ] Dizziness, [ ] decreased memory, [ ] fainting, [ ] focal neurological symptoms, [ ] headaches, [ ] incontinence of stool, [ ] incontinence of urine, [ ] loss of consciousness, [ ] numbness, [ ] seizures, [ ] spinning sensation, [ ] stroke, [ ] trouble walking, [ ] unsteadiness, [ ] visual changes, [ ] weakness  Psychiatric: [ ] None,  [ ] depression, [ ] anxiety, [ ] hallucinations, [ ] inability to concentrate, [ ] mood changes, [ ] panic attacks  Hematology: [ ] None,  [ ] blood clots, [ ] spontaneous bleeding      Objective:   Vital Signs Last 24 Hrs  T(C): 36.2 (18 May 2019 13:35), Max: 36.6 (17 May 2019 22:01)  T(F): 97.1 (18 May 2019 13:35), Max: 97.9 (17 May 2019 22:01)  HR: 78 (18 May 2019 13:35) (75 - 78)  BP: 140/59 (18 May 2019 13:35) (134/77 - 154/72)  BP(mean): --  RR: 18 (18 May 2019 13:35) (17 - 18)  SpO2: 98% (18 May 2019 13:35) (98% - 100%)    General Exam:   General appearance: No acute distress                 Cardiovascular: Pedal dorsalis pulses intact bilaterally    Neurological Exam:  Mental Status: Orientated to self, date and place.  Attention intact.  No dysarthria, aphasia or neglect.  Knowledge intact.  Registration intact.  Short and long term memory grossly intact.      Cranial Nerves: CN I - not tested.  PERRL, EOMI, VFF, no nystagmus or diplopia.  No APD.  Fundi not visualized bilaterally.  CN V1-3 intact to light touch and pinprick.  No facial asymmetry.  Hearing intact to finger rub bilaterally.  Tongue, uvula and palate midline.  Sternocleidomastoid and Trapezius intact bilaterally.    Motor:   Tone: normal.                  Strength: intact throughout  Pronator drift: none                 Dysmeria: None to finger-nose-finger or heel-shin-heel  No truncal ataxia.    Tremor: No resting, postural or action tremor.  No myoclonus.    Sensation: intact to light touch, pinprick, vibration and proprioception    Deep Tendon Reflexes: 1+ bilateral biceps, triceps, brachioradialis, knee and ankle  Toes flexor bilaterally    Gait: normal and stable.      Other:                    Radiology    EKG:  tele:  TTE:  EEG:                 Please contact the Neurology consult service with any questions.    Mane Marcano MD  Neurology Attending  [facility] Neurology Follow up note    Name  JUDE BILLY    HPI:  84/F form home lives with daughter independent at baseline with PMH of dementia, HTN, HLD presented with dizziness and upper abdominal pain. Patient reports that today she started having dizziness which she describes as room spinning sensation associated with nausea, worsening with head movement, improving with lying down and closed eyes, she had similar episode in past for which she was told that its form ear problems and which improved with medications.  Denies episode of vomiting, fall, ear pain or discharge. She also mentions epigastric colicky pain which is 8/10 in intensity, no aggravating or reliving factors. Denies vomiting, diarrhea, SOB, SCHWARTZ, palpitations, headache, cough, wheezing, joint pain or swelling, fever, chills. (09 May 2019 22:33)    Interval History -  Interview is conducted in Khmer. The patient continues to have room-spinning sensation when sitting forward.    MEDICATIONS  (STANDING):  aspirin enteric coated 81 milliGRAM(s) Oral daily  atorvastatin 80 milliGRAM(s) Oral at bedtime  cholecalciferol 1000 Unit(s) Oral daily  donepezil 10 milliGRAM(s) Oral at bedtime  enoxaparin Injectable 40 milliGRAM(s) SubCutaneous daily  ergocalciferol 14546 Unit(s) Oral <User Schedule>  LORazepam     Tablet 0.5 milliGRAM(s) Oral every 8 hours  losartan 50 milliGRAM(s) Oral two times a day  memantine 10 milliGRAM(s) Oral daily  methylPREDNISolone sodium succinate Injectable 40 milliGRAM(s) IV Push once  pantoprazole    Tablet 40 milliGRAM(s) Oral before breakfast  promethazine 25 milliGRAM(s) Oral two times a day    MEDICATIONS  (PRN):  acetaminophen   Tablet .. 650 milliGRAM(s) Oral every 6 hours PRN Temp greater or equal to 38C (100.4F), Mild Pain (1 - 3)  aluminum hydroxide/magnesium hydroxide/simethicone Suspension 30 milliLiter(s) Oral every 6 hours PRN Dyspepsia  benzocaine 15 mG/menthol 3.6 mG Lozenge 1 Lozenge Oral three times a day PRN Sore Throat  meclizine 50 milliGRAM(s) Oral every 6 hours PRN Dizziness  simethicone 80 milliGRAM(s) Chew four times a day PRN Gas  sodium chloride 0.65% Nasal 1 Spray(s) Both Nostrils three times a day PRN Nasal Congestion    Allergies  No Known Allergies    Review of Systems: Fourteen systems reviewed and negative except as in HPI / Interval History.      Objective:   Vital Signs Last 24 Hrs  T(C): 36.2 (18 May 2019 13:35), Max: 36.6 (17 May 2019 22:01)  T(F): 97.1 (18 May 2019 13:35), Max: 97.9 (17 May 2019 22:01)  HR: 78 (18 May 2019 13:35) (75 - 78)  BP: 140/59 (18 May 2019 13:35) (134/77 - 154/72)  RR: 18 (18 May 2019 13:35) (17 - 18)  SpO2: 98% (18 May 2019 13:35) (98% - 100%)    General Exam:  General: No acute distress  Respiratory: CTAB/l.  No crackles, rhonchi, or wheezes.  Cardiovascular: RRR, No murmurs, Full b/l radial and pedal pulses    Neurological Exam:  General / Mental Status: Oriented to person, place, and time.  No dysarthria or aphasia present.  Naming and repetition intact.  Cranial Nerves: PERRLA, EOMI x 2, VFF x 4, No nystagmus or diplopia, Optic discs normal b/l.  Negative Crofton-Hallpike maneuver b/l.  B/l V1-V3 equal to light touch and pinprick.  Symmetric facial movement and palate elevation.  B/l hearing equal to finger rub.  5/5 strength with b/l sternocleidomastoid and trapezius.  Midline tongue protrusion with no atrophy or fasciculations.  Motor: Normal bulk and tone in all four extremities.  5/5 strength throughout all four extremities.  No downward drift, rigidity, spasticity, or tremors in any of the four extremities.  Sensation: Intact to light touch and pinprick in all four extremities.  Coordination: No dysmetria with b/l finger-to-nose and heel raise tests.  Reflexes: 2+ and symmetric at b/l biceps, triceps, brachioradialis, patellae, and ankles.  Toes flexor b/l.  Gait and Romberg testing deferred per patient request.      Labs:    Lipid Profile (05.10.19 @ 06:04)    Total Cholesterol/HDL Ratio Measurement: 2.8 RATIO    Cholesterol, Serum: 198 mg/dL    Triglycerides, Serum: 135 mg/dL    HDL Cholesterol, Serum: 71 mg/dL    Direct LDL: 100 mg/dL    Hemoglobin A1C, Whole Blood (05.10.19 @ 09:22)    Hemoglobin A1C, Whole Blood: 5.8%    Thyroid Stimulating Hormone, Serum (05.10.19 @ 06:04)    Thyroid Stimulating Hormone, Serum: 1.07 uU/mL    Vitamin B12, Serum (05.10.19 @ 09:18)    Vitamin B12, Serum: 625 pg/mL    Folate, Serum (05.10.19 @ 09:18)    Folate, Serum: 17.1 ng/mL      Neuroimaging:    CT Head (5/9/19):  - No acute intracranial abnormality  - Chronic microvascular disease    Carotid Ultrasound (5/10/19):  - No significant stenosis  - Irregular pulses noted    MRI Brain (5/12/19):  - No acute intracranial abnormality  - Chronic microvascular disease  - Age-related atrophy      Assessment:  84 RHF with likely peripheral vertigo secondary to inner ear dysfunction, without evidence of posterior circulation stroke on MRI Brain.      Recommendations:    - Continue meclizine 50mg Q6H to manage symptoms    - Continue lorazepam 0.5mg BID to help manage symptoms    - PT/OT for vestibular therapy    - Set up otolaryngology outpatient follow-up    - Consider providing handout on self-administration of Epley maneuver at home      Please contact the Neurology consult service with any questions.    Mane Marcano MD  Neurology Attending  [facility]

## 2019-05-18 NOTE — PROGRESS NOTE ADULT - SUBJECTIVE AND OBJECTIVE BOX
Patient is a 84y old  Female who presents with a chief complaint of dizziness and upper abdominal pain (18 May 2019 11:30)    pt seen in tele [ x ], reg med floor [   ], bed [ ], chair at bedside [ x  ], a+o x3 [  ], lethargic [  ],  nad [ x ]    still with dizziness, but improving. GI symptoms have improved. left great toe pain much improved       Allergies    No Known Allergies        Vitals    T(F): 97.8 (05-18-19 @ 04:27), Max: 97.9 (05-17-19 @ 15:07)  HR: 78 (05-18-19 @ 04:27) (75 - 83)  BP: 134/77 (05-18-19 @ 04:27) (130/66 - 154/72)  RR: 17 (05-18-19 @ 04:27) (17 - 18)  SpO2: 99% (05-18-19 @ 04:27) (98% - 100%)  Wt(kg): --  CAPILLARY BLOOD GLUCOSE          Labs                      .Blood  05-10 @ 19:03   No growth at 5 days.  --  --      .Urine  05-10 @ 00:46   <10,000 CFU/mL Normal Urogenital Laura  --  --          Radiology Results      Meds    MEDICATIONS  (STANDING):  aspirin enteric coated 81 milliGRAM(s) Oral daily  atorvastatin 80 milliGRAM(s) Oral at bedtime  cefTRIAXone   IVPB 1 Gram(s) IV Intermittent every 24 hours  cefTRIAXone   IVPB      cholecalciferol 1000 Unit(s) Oral daily  donepezil 10 milliGRAM(s) Oral at bedtime  enoxaparin Injectable 40 milliGRAM(s) SubCutaneous daily  ergocalciferol 08348 Unit(s) Oral <User Schedule>  LORazepam     Tablet 0.5 milliGRAM(s) Oral every 8 hours  losartan 50 milliGRAM(s) Oral two times a day  memantine 10 milliGRAM(s) Oral daily  pantoprazole    Tablet 40 milliGRAM(s) Oral before breakfast  promethazine 25 milliGRAM(s) Oral two times a day      MEDICATIONS  (PRN):  acetaminophen   Tablet .. 650 milliGRAM(s) Oral every 6 hours PRN Temp greater or equal to 38C (100.4F), Mild Pain (1 - 3)  aluminum hydroxide/magnesium hydroxide/simethicone Suspension 30 milliLiter(s) Oral every 6 hours PRN Dyspepsia  benzocaine 15 mG/menthol 3.6 mG Lozenge 1 Lozenge Oral three times a day PRN Sore Throat  meclizine 50 milliGRAM(s) Oral every 6 hours PRN Dizziness  simethicone 80 milliGRAM(s) Chew four times a day PRN Gas  sodium chloride 0.65% Nasal 1 Spray(s) Both Nostrils three times a day PRN Nasal Congestion      Physical Exam      Neuro :  no focal deficits except b/l nystagmus   Respiratory: CTA B/L  CV: RRR, S1S2, no murmurs,   Abdominal: Soft, NT, ND +BS,  Extremities: No edema, + peripheral pulses, + tenderness and swelling left toe at the mtp joint       ASSESSMENT    uti  Dizziness and giddiness  abd pain   ? gout arthritis left great toe   vertigo   renal lesion   h/o HTN  cholecystectomy      PLAN        neuro f/u  meclizine to 50mg qid  ativan to 0.5mg bid   promethazine 25mg bid   outpt vestibular therapy   oupt otolaryngology f/u   MRI brain noted wnl   cont statin, asa, meclizine   d/c tele   ua +ve   completed course of ceftriaxone   ct head noted   ct abd noted   blood cx neg noted above  urine cx wnl  echo noted EF > 55%   carotid doppler noted -ve  abd us noted : b/l renal cyst and hepatic steatosis  cardio f/u noted  stress test neg noted   PT eval   simethicone for abd pain and gas  gi cons noted  egd as outpt   cont anti reflux   s/p solumedrol 40mg iv daily x 2 dose   start prednisone 40mg qdand taper over 2 weeks  continue current meds Patient is a 84y old  Female who presents with a chief complaint of dizziness and upper abdominal pain (18 May 2019 11:30)    pt seen in tele [  ], reg med floor [ x], bed [ ], chair at bedside [ x  ], a+o x3 [  ], lethargic [  ],  nad [ x ]    still with dizziness, but improving. GI symptoms have resolved. left great toe pain much improved       Allergies    No Known Allergies        Vitals    T(F): 97.8 (05-18-19 @ 04:27), Max: 97.9 (05-17-19 @ 15:07)  HR: 78 (05-18-19 @ 04:27) (75 - 83)  BP: 134/77 (05-18-19 @ 04:27) (130/66 - 154/72)  RR: 17 (05-18-19 @ 04:27) (17 - 18)  SpO2: 99% (05-18-19 @ 04:27) (98% - 100%)  Wt(kg): --  CAPILLARY BLOOD GLUCOSE          Labs                      .Blood  05-10 @ 19:03   No growth at 5 days.  --  --      .Urine  05-10 @ 00:46   <10,000 CFU/mL Normal Urogenital Laura  --  --          Radiology Results      Meds    MEDICATIONS  (STANDING):  aspirin enteric coated 81 milliGRAM(s) Oral daily  atorvastatin 80 milliGRAM(s) Oral at bedtime  cefTRIAXone   IVPB 1 Gram(s) IV Intermittent every 24 hours  cefTRIAXone   IVPB      cholecalciferol 1000 Unit(s) Oral daily  donepezil 10 milliGRAM(s) Oral at bedtime  enoxaparin Injectable 40 milliGRAM(s) SubCutaneous daily  ergocalciferol 46868 Unit(s) Oral <User Schedule>  LORazepam     Tablet 0.5 milliGRAM(s) Oral every 8 hours  losartan 50 milliGRAM(s) Oral two times a day  memantine 10 milliGRAM(s) Oral daily  pantoprazole    Tablet 40 milliGRAM(s) Oral before breakfast  promethazine 25 milliGRAM(s) Oral two times a day      MEDICATIONS  (PRN):  acetaminophen   Tablet .. 650 milliGRAM(s) Oral every 6 hours PRN Temp greater or equal to 38C (100.4F), Mild Pain (1 - 3)  aluminum hydroxide/magnesium hydroxide/simethicone Suspension 30 milliLiter(s) Oral every 6 hours PRN Dyspepsia  benzocaine 15 mG/menthol 3.6 mG Lozenge 1 Lozenge Oral three times a day PRN Sore Throat  meclizine 50 milliGRAM(s) Oral every 6 hours PRN Dizziness  simethicone 80 milliGRAM(s) Chew four times a day PRN Gas  sodium chloride 0.65% Nasal 1 Spray(s) Both Nostrils three times a day PRN Nasal Congestion      Physical Exam      Neuro :  no focal deficits except b/l nystagmus   Respiratory: CTA B/L  CV: RRR, S1S2, no murmurs,   Abdominal: Soft, NT, ND +BS,  Extremities: No edema, + peripheral pulses, + tenderness and swelling left toe at the mtp joint       ASSESSMENT    uti  Dizziness and giddiness  abd pain   ? gout arthritis left great toe   vertigo   renal lesion   h/o HTN  cholecystectomy      PLAN        neuro f/u  meclizine to 50mg qid  ativan to 0.5mg bid   promethazine 25mg bid   outpt vestibular therapy   oupt otolaryngology f/u   MRI brain noted wnl   cont statin, asa, meclizine   ua +ve   completed course of ceftriaxone   ct head noted   ct abd noted   blood cx neg noted above  urine cx wnl  echo noted EF > 55%   carotid doppler noted -ve  abd us noted : b/l renal cyst and hepatic steatosis  cardio f/u noted  stress test neg noted   PT eval   simethicone for abd pain and gas  gi cons noted  egd as outpt   cont anti reflux   s/p solumedrol 40mg iv daily x 2 dose   start prednisone 40mg qd and taper over 2 weeks  continue current meds   d/c plan for am if stable

## 2019-05-19 VITALS
SYSTOLIC BLOOD PRESSURE: 140 MMHG | OXYGEN SATURATION: 97 % | HEART RATE: 87 BPM | DIASTOLIC BLOOD PRESSURE: 67 MMHG | RESPIRATION RATE: 17 BRPM

## 2019-05-19 LAB
ANION GAP SERPL CALC-SCNC: 8 MMOL/L — SIGNIFICANT CHANGE UP (ref 5–17)
BUN SERPL-MCNC: 15 MG/DL — SIGNIFICANT CHANGE UP (ref 7–18)
CALCIUM SERPL-MCNC: 9.5 MG/DL — SIGNIFICANT CHANGE UP (ref 8.4–10.5)
CHLORIDE SERPL-SCNC: 106 MMOL/L — SIGNIFICANT CHANGE UP (ref 96–108)
CO2 SERPL-SCNC: 26 MMOL/L — SIGNIFICANT CHANGE UP (ref 22–31)
CREAT SERPL-MCNC: 0.88 MG/DL — SIGNIFICANT CHANGE UP (ref 0.5–1.3)
GLUCOSE BLDC GLUCOMTR-MCNC: 118 MG/DL — HIGH (ref 70–99)
GLUCOSE SERPL-MCNC: 136 MG/DL — HIGH (ref 70–99)
HCT VFR BLD CALC: 41.8 % — SIGNIFICANT CHANGE UP (ref 34.5–45)
HGB BLD-MCNC: 13.7 G/DL — SIGNIFICANT CHANGE UP (ref 11.5–15.5)
MCHC RBC-ENTMCNC: 29.9 PG — SIGNIFICANT CHANGE UP (ref 27–34)
MCHC RBC-ENTMCNC: 32.8 GM/DL — SIGNIFICANT CHANGE UP (ref 32–36)
MCV RBC AUTO: 91.3 FL — SIGNIFICANT CHANGE UP (ref 80–100)
NRBC # BLD: 0 /100 WBCS — SIGNIFICANT CHANGE UP (ref 0–0)
PLATELET # BLD AUTO: 366 K/UL — SIGNIFICANT CHANGE UP (ref 150–400)
POTASSIUM SERPL-MCNC: 4.2 MMOL/L — SIGNIFICANT CHANGE UP (ref 3.5–5.3)
POTASSIUM SERPL-SCNC: 4.2 MMOL/L — SIGNIFICANT CHANGE UP (ref 3.5–5.3)
RBC # BLD: 4.58 M/UL — SIGNIFICANT CHANGE UP (ref 3.8–5.2)
RBC # FLD: 12.3 % — SIGNIFICANT CHANGE UP (ref 10.3–14.5)
SODIUM SERPL-SCNC: 140 MMOL/L — SIGNIFICANT CHANGE UP (ref 135–145)
WBC # BLD: 7.11 K/UL — SIGNIFICANT CHANGE UP (ref 3.8–10.5)
WBC # FLD AUTO: 7.11 K/UL — SIGNIFICANT CHANGE UP (ref 3.8–10.5)

## 2019-05-19 RX ORDER — PANTOPRAZOLE SODIUM 20 MG/1
1 TABLET, DELAYED RELEASE ORAL
Qty: 30 | Refills: 0
Start: 2019-05-19 | End: 2019-06-17

## 2019-05-19 RX ORDER — ERGOCALCIFEROL 1.25 MG/1
1 CAPSULE ORAL
Qty: 6 | Refills: 0
Start: 2019-05-19 | End: 2019-06-17

## 2019-05-19 RX ORDER — ERGOCALCIFEROL 1.25 MG/1
1 CAPSULE ORAL
Qty: 4 | Refills: 0
Start: 2019-05-19 | End: 2019-06-17

## 2019-05-19 RX ORDER — SIMETHICONE 80 MG/1
1 TABLET, CHEWABLE ORAL
Qty: 28 | Refills: 0
Start: 2019-05-19 | End: 2019-05-25

## 2019-05-19 RX ORDER — MECLIZINE HCL 12.5 MG
2 TABLET ORAL
Qty: 112 | Refills: 0
Start: 2019-05-19 | End: 2019-06-01

## 2019-05-19 RX ORDER — LOSARTAN/HYDROCHLOROTHIAZIDE 100MG-25MG
1 TABLET ORAL
Qty: 30 | Refills: 0
Start: 2019-05-19 | End: 2019-06-17

## 2019-05-19 RX ADMIN — Medication 81 MILLIGRAM(S): at 12:17

## 2019-05-19 RX ADMIN — Medication 1000 UNIT(S): at 12:17

## 2019-05-19 RX ADMIN — Medication 25 MILLIGRAM(S): at 05:29

## 2019-05-19 RX ADMIN — PANTOPRAZOLE SODIUM 40 MILLIGRAM(S): 20 TABLET, DELAYED RELEASE ORAL at 06:47

## 2019-05-19 RX ADMIN — ENOXAPARIN SODIUM 40 MILLIGRAM(S): 100 INJECTION SUBCUTANEOUS at 12:17

## 2019-05-19 RX ADMIN — LOSARTAN POTASSIUM 50 MILLIGRAM(S): 100 TABLET, FILM COATED ORAL at 05:29

## 2019-05-19 RX ADMIN — LOSARTAN POTASSIUM 50 MILLIGRAM(S): 100 TABLET, FILM COATED ORAL at 19:00

## 2019-05-19 RX ADMIN — Medication 25 MILLIGRAM(S): at 18:59

## 2019-05-19 RX ADMIN — Medication 0.5 MILLIGRAM(S): at 14:08

## 2019-05-19 RX ADMIN — Medication 40 MILLIGRAM(S): at 14:08

## 2019-05-19 RX ADMIN — MEMANTINE HYDROCHLORIDE 10 MILLIGRAM(S): 10 TABLET ORAL at 12:17

## 2019-05-19 RX ADMIN — Medication 0.5 MILLIGRAM(S): at 05:32

## 2019-05-19 NOTE — PROGRESS NOTE ADULT - SUBJECTIVE AND OBJECTIVE BOX
Patient is a 84y old  Female who presents with a chief complaint of dizziness and upper abdominal pain (18 May 2019 19:10)    pt seen in tele [  ], reg med floor [ x], bed [ ], chair at bedside [ x  ], a+o x3 [  ], lethargic [  ],  nad [ x ]      Allergies    No Known Allergies        Vitals    T(F): 97.3 (05-19-19 @ 05:11), Max: 97.5 (05-18-19 @ 21:00)  HR: 92 (05-19-19 @ 05:11) (78 - 92)  BP: 128/70 (05-19-19 @ 05:11) (128/70 - 146/67)  RR: 18 (05-19-19 @ 05:11) (17 - 18)  SpO2: 95% (05-19-19 @ 05:11) (95% - 99%)  Wt(kg): --  CAPILLARY BLOOD GLUCOSE          Labs                          13.7   7.11  )-----------( 366      ( 19 May 2019 06:17 )             41.8       05-19    140  |  106  |  15  ----------------------------<  136<H>  4.2   |  26  |  0.88    Ca    9.5      19 May 2019 06:17              .Blood  05-10 @ 19:03   No growth at 5 days.  --  --      .Urine  05-10 @ 00:46   <10,000 CFU/mL Normal Urogenital Laura  --  --          Radiology Results      Meds    MEDICATIONS  (STANDING):  aspirin enteric coated 81 milliGRAM(s) Oral daily  atorvastatin 80 milliGRAM(s) Oral at bedtime  cholecalciferol 1000 Unit(s) Oral daily  donepezil 10 milliGRAM(s) Oral at bedtime  enoxaparin Injectable 40 milliGRAM(s) SubCutaneous daily  ergocalciferol 72909 Unit(s) Oral <User Schedule>  LORazepam     Tablet 0.5 milliGRAM(s) Oral every 8 hours  losartan 50 milliGRAM(s) Oral two times a day  memantine 10 milliGRAM(s) Oral daily  pantoprazole    Tablet 40 milliGRAM(s) Oral before breakfast  promethazine 25 milliGRAM(s) Oral two times a day      MEDICATIONS  (PRN):  acetaminophen   Tablet .. 650 milliGRAM(s) Oral every 6 hours PRN Temp greater or equal to 38C (100.4F), Mild Pain (1 - 3)  aluminum hydroxide/magnesium hydroxide/simethicone Suspension 30 milliLiter(s) Oral every 6 hours PRN Dyspepsia  benzocaine 15 mG/menthol 3.6 mG Lozenge 1 Lozenge Oral three times a day PRN Sore Throat  meclizine 50 milliGRAM(s) Oral every 6 hours PRN Dizziness  simethicone 80 milliGRAM(s) Chew four times a day PRN Gas  sodium chloride 0.65% Nasal 1 Spray(s) Both Nostrils three times a day PRN Nasal Congestion      Physical Exam      uti  Dizziness and giddiness  abd pain   ? gout arthritis left great toe   vertigo   renal lesion   h/o HTN  cholecystectomy      PLAN        neuro f/u  meclizine to 50mg qid  ativan to 0.5mg bid   promethazine 25mg bid   outpt vestibular therapy   oupt otolaryngology f/u   MRI brain noted wnl   cont statin, asa, meclizine   ua +ve   completed course of ceftriaxone   ct head noted   ct abd noted   blood cx neg noted above  urine cx wnl  echo noted EF > 55%   carotid doppler noted -ve  abd us noted : b/l renal cyst and hepatic steatosis  cardio f/u noted  stress test neg noted   simethicone prn for abd pain and gas  gi cons noted  egd as outpt   cont anti reflux   s/p solumedrol 40mg iv daily x 3 dose   start prednisone 40mg qd and taper over 2 weeks  continue current meds   pt stable for d/c , f/u with ent in am

## 2019-05-19 NOTE — PROGRESS NOTE ADULT - PROVIDER SPECIALTY LIST ADULT
Cardiology
Internal Medicine
Neurology
Cardiology
Internal Medicine
Neurology
Cardiology
Internal Medicine
Internal Medicine

## 2019-05-19 NOTE — PROGRESS NOTE ADULT - SUBJECTIVE AND OBJECTIVE BOX
CHIEF COMPLAINT:Patient is a 84y old  Female who presents with a chief complaint of dizziness and upper abdominal pain .Pt appears comfortable.    	  REVIEW OF SYSTEMS:  CONSTITUTIONAL: No fever, weight loss, or fatigue  EYES: No eye pain, visual disturbances, or discharge  ENT:  No difficulty hearing, tinnitus, vertigo; No sinus or throat pain  NECK: No pain or stiffness  RESPIRATORY: No cough, wheezing, chills or hemoptysis; No Shortness of Breath  CARDIOVASCULAR: No chest pain, palpitations, passing out, dizziness, or leg swelling  GASTROINTESTINAL: No abdominal or epigastric pain. No nausea, vomiting, or hematemesis; No diarrhea or constipation. No melena or hematochezia.  GENITOURINARY: No dysuria, frequency, hematuria, or incontinence  NEUROLOGICAL: No headaches, memory loss, loss of strength, numbness, or tremors  SKIN: No itching, burning, rashes, or lesions   LYMPH Nodes: No enlarged glands  ENDOCRINE: No heat or cold intolerance; No hair loss  MUSCULOSKELETAL: No joint pain or swelling; No muscle, back, or extremity pain  PSYCHIATRIC: No depression, anxiety, mood swings, or difficulty sleeping  HEME/LYMPH: No easy bruising, or bleeding gums  ALLERGY AND IMMUNOLOGIC: No hives or eczema	      PHYSICAL EXAM:  T(C): 36.3 (05-19-19 @ 11:25), Max: 36.4 (05-18-19 @ 21:00)  HR: 91 (05-19-19 @ 11:25) (78 - 92)  BP: 135/66 (05-19-19 @ 11:25) (128/70 - 146/67)  RR: 17 (05-19-19 @ 11:25) (17 - 18)  SpO2: 100% (05-19-19 @ 11:25) (95% - 100%)      Appearance: Normal	  HEENT:   Normal oral mucosa, PERRL, EOMI	  Lymphatic: No lymphadenopathy  Cardiovascular: Normal S1 S2, No JVD, No murmurs, No edema  Respiratory: Lungs clear to auscultation	  Psychiatry: A & O x 3, Mood & affect appropriate  Gastrointestinal:  Soft, Non-tender, + BS	  Skin: No rashes, No ecchymoses, No cyanosis	  Neurologic: Non-focal  Extremities: Normal range of motion, No clubbing, cyanosis or edema  Vascular: Peripheral pulses palpable 2+ bilaterally    MEDICATIONS  (STANDING):  aspirin enteric coated 81 milliGRAM(s) Oral daily  atorvastatin 80 milliGRAM(s) Oral at bedtime  cholecalciferol 1000 Unit(s) Oral daily  donepezil 10 milliGRAM(s) Oral at bedtime  enoxaparin Injectable 40 milliGRAM(s) SubCutaneous daily  ergocalciferol 60898 Unit(s) Oral <User Schedule>  LORazepam     Tablet 0.5 milliGRAM(s) Oral every 8 hours  losartan 50 milliGRAM(s) Oral two times a day  memantine 10 milliGRAM(s) Oral daily  pantoprazole    Tablet 40 milliGRAM(s) Oral before breakfast  predniSONE   Tablet 40 milliGRAM(s) Oral daily  promethazine 25 milliGRAM(s) Oral two times a day        	  LABS:	 	                      13.7   7.11  )-----------( 366      ( 19 May 2019 06:17 )             41.8     05-19    140  |  106  |  15  ----------------------------<  136<H>  4.2   |  26  |  0.88    Ca    9.5      19 May 2019 06:17        Lipid Profile: Cholesterol 198    HDL 71      HgA1c: Hemoglobin A1C, Whole Blood: 5.8 % (05-10 @ 09:22)    TSH: Thyroid Stimulating Hormone, Serum: 1.07 uU/mL (05-10 @ 06:04)

## 2019-05-19 NOTE — PROGRESS NOTE ADULT - REASON FOR ADMISSION
dizziness and upper abdominal pain

## 2019-05-29 PROBLEM — Z00.00 ENCOUNTER FOR PREVENTIVE HEALTH EXAMINATION: Noted: 2019-05-29

## 2019-07-10 PROCEDURE — 93005 ELECTROCARDIOGRAM TRACING: CPT

## 2019-07-10 PROCEDURE — 82306 VITAMIN D 25 HYDROXY: CPT

## 2019-07-10 PROCEDURE — 87086 URINE CULTURE/COLONY COUNT: CPT

## 2019-07-10 PROCEDURE — 93306 TTE W/DOPPLER COMPLETE: CPT

## 2019-07-10 PROCEDURE — 83605 ASSAY OF LACTIC ACID: CPT

## 2019-07-10 PROCEDURE — 82553 CREATINE MB FRACTION: CPT

## 2019-07-10 PROCEDURE — 80053 COMPREHEN METABOLIC PANEL: CPT

## 2019-07-10 PROCEDURE — 93880 EXTRACRANIAL BILAT STUDY: CPT

## 2019-07-10 PROCEDURE — 80048 BASIC METABOLIC PNL TOTAL CA: CPT

## 2019-07-10 PROCEDURE — 84100 ASSAY OF PHOSPHORUS: CPT

## 2019-07-10 PROCEDURE — 76700 US EXAM ABDOM COMPLETE: CPT

## 2019-07-10 PROCEDURE — 96374 THER/PROPH/DIAG INJ IV PUSH: CPT

## 2019-07-10 PROCEDURE — 96375 TX/PRO/DX INJ NEW DRUG ADDON: CPT

## 2019-07-10 PROCEDURE — 36415 COLL VENOUS BLD VENIPUNCTURE: CPT

## 2019-07-10 PROCEDURE — 84443 ASSAY THYROID STIM HORMONE: CPT

## 2019-07-10 PROCEDURE — 82962 GLUCOSE BLOOD TEST: CPT

## 2019-07-10 PROCEDURE — 80061 LIPID PANEL: CPT

## 2019-07-10 PROCEDURE — 82746 ASSAY OF FOLIC ACID SERUM: CPT

## 2019-07-10 PROCEDURE — 83690 ASSAY OF LIPASE: CPT

## 2019-07-10 PROCEDURE — 87040 BLOOD CULTURE FOR BACTERIA: CPT

## 2019-07-10 PROCEDURE — 97116 GAIT TRAINING THERAPY: CPT

## 2019-07-10 PROCEDURE — 82550 ASSAY OF CK (CPK): CPT

## 2019-07-10 PROCEDURE — 76775 US EXAM ABDO BACK WALL LIM: CPT

## 2019-07-10 PROCEDURE — 99285 EMERGENCY DEPT VISIT HI MDM: CPT | Mod: 25

## 2019-07-10 PROCEDURE — 97161 PT EVAL LOW COMPLEX 20 MIN: CPT

## 2019-07-10 PROCEDURE — 70450 CT HEAD/BRAIN W/O DYE: CPT

## 2019-07-10 PROCEDURE — 81001 URINALYSIS AUTO W/SCOPE: CPT

## 2019-07-10 PROCEDURE — 93017 CV STRESS TEST TRACING ONLY: CPT

## 2019-07-10 PROCEDURE — 83036 HEMOGLOBIN GLYCOSYLATED A1C: CPT

## 2019-07-10 PROCEDURE — 82607 VITAMIN B-12: CPT

## 2019-07-10 PROCEDURE — 78452 HT MUSCLE IMAGE SPECT MULT: CPT

## 2019-07-10 PROCEDURE — 74177 CT ABD & PELVIS W/CONTRAST: CPT

## 2019-07-10 PROCEDURE — 70551 MRI BRAIN STEM W/O DYE: CPT

## 2019-07-10 PROCEDURE — 84484 ASSAY OF TROPONIN QUANT: CPT

## 2019-07-10 PROCEDURE — A9502: CPT

## 2019-07-10 PROCEDURE — 85027 COMPLETE CBC AUTOMATED: CPT

## 2019-07-10 PROCEDURE — 97530 THERAPEUTIC ACTIVITIES: CPT

## 2019-07-10 PROCEDURE — 83735 ASSAY OF MAGNESIUM: CPT

## 2019-07-18 ENCOUNTER — APPOINTMENT (OUTPATIENT)
Dept: OTOLARYNGOLOGY | Facility: CLINIC | Age: 84
End: 2019-07-18

## 2019-07-24 PROBLEM — H40.9 UNSPECIFIED GLAUCOMA: Chronic | Status: ACTIVE | Noted: 2018-04-13

## 2019-07-24 PROBLEM — E78.5 HYPERLIPIDEMIA, UNSPECIFIED: Chronic | Status: ACTIVE | Noted: 2018-04-13

## 2019-07-24 PROBLEM — I10 ESSENTIAL (PRIMARY) HYPERTENSION: Chronic | Status: ACTIVE | Noted: 2018-04-13

## 2019-09-26 ENCOUNTER — APPOINTMENT (OUTPATIENT)
Dept: OTOLARYNGOLOGY | Facility: CLINIC | Age: 84
End: 2019-09-26
Payer: MEDICAID

## 2019-09-26 ENCOUNTER — OUTPATIENT (OUTPATIENT)
Dept: OUTPATIENT SERVICES | Facility: HOSPITAL | Age: 84
LOS: 1 days | Discharge: HOME | End: 2019-09-26

## 2019-09-26 VITALS
SYSTOLIC BLOOD PRESSURE: 178 MMHG | DIASTOLIC BLOOD PRESSURE: 93 MMHG | BODY MASS INDEX: 28.86 KG/M2 | HEART RATE: 67 BPM | HEIGHT: 60 IN | WEIGHT: 147 LBS

## 2019-09-26 DIAGNOSIS — Z82.49 FAMILY HISTORY OF ISCHEMIC HEART DISEASE AND OTHER DISEASES OF THE CIRCULATORY SYSTEM: ICD-10-CM

## 2019-09-26 DIAGNOSIS — Z80.9 FAMILY HISTORY OF MALIGNANT NEOPLASM, UNSPECIFIED: ICD-10-CM

## 2019-09-26 DIAGNOSIS — Z90.49 ACQUIRED ABSENCE OF OTHER SPECIFIED PARTS OF DIGESTIVE TRACT: Chronic | ICD-10-CM

## 2019-09-26 DIAGNOSIS — Z86.79 PERSONAL HISTORY OF OTHER DISEASES OF THE CIRCULATORY SYSTEM: ICD-10-CM

## 2019-09-26 PROCEDURE — 92557 COMPREHENSIVE HEARING TEST: CPT

## 2019-09-26 PROCEDURE — 92567 TYMPANOMETRY: CPT

## 2019-09-26 PROCEDURE — 99204 OFFICE O/P NEW MOD 45 MIN: CPT | Mod: 25

## 2019-09-26 RX ORDER — DORZOLAMIDE HYDROCHLORIDE TIMOLOL MALEATE 20; 5 MG/ML; MG/ML
22.3-6.8 SOLUTION/ DROPS OPHTHALMIC
Refills: 0 | Status: ACTIVE | COMMUNITY

## 2019-09-26 RX ORDER — MELOXICAM 15 MG/1
TABLET ORAL
Refills: 0 | Status: ACTIVE | COMMUNITY

## 2019-09-26 RX ORDER — ASPIRIN 81 MG
81 TABLET, DELAYED RELEASE (ENTERIC COATED) ORAL
Refills: 0 | Status: ACTIVE | COMMUNITY

## 2019-09-27 NOTE — HISTORY OF PRESENT ILLNESS
[de-identified] : 84 year old female here for vertigo.  States she had her first vertigo episodes about a year ago lasting about 2 days.   had another episode in May 2019,  has constant dizziness since then.  Patient describes  “dizzy” as her body is swaying.    Niece states she had 8 vestibular therapy sessions, with relief.   was in the hospital in May for two weeks for vertigo.\par

## 2019-09-27 NOTE — PHYSICAL EXAM
[Hearing Loss Left Only] : normal [Hearing Loss Right Only] : normal [Rinne Test Air Conduction Persists > Bone Conduction Right] : air conduction greater than bone conduction on the right [Rinne Test Air Conduction Persists > Bone Conduction Left] : air conduction greater than bone conduction on the left [Hearing Roberts Test (Tuning Fork On Forehead)] : no lateralization of tone [Nystagmus] : ~T no ~M nystagmus was seen [Fukuda Step Test] : Fukuda Step Test was Positive [Romberg's Sign] : Romberg's sign was absent [Fistula Sign] : Fistula Sign: Negative [Past-Pointing] : Past-Pointing: Negative [Shania-Halldavianke] : [place]-Hallpike: Negative [FreeTextEntry1] : +SERGIO to R, + cheryluda to R [Midline] : trachea located in midline position [Normal] : no rashes

## 2019-09-27 NOTE — REASON FOR VISIT
[Initial Evaluation] : an initial evaluation for [Patient Declined  Services] : - None: Patient declined  services [FreeTextEntry3] : Patient declined offer of translation service.  Patient preferred to use accompanying family member/friend for translation.\par  [FreeTextEntry2] : senthil Ospina [TWNoteComboBox1] : [place]

## 2019-10-09 DIAGNOSIS — H90.42 SENSORINEURAL HEARING LOSS, UNILATERAL, LEFT EAR, WITH UNRESTRICTED HEARING ON THE CONTRALATERAL SIDE: ICD-10-CM

## 2019-10-09 DIAGNOSIS — R42 DIZZINESS AND GIDDINESS: ICD-10-CM

## 2019-10-17 ENCOUNTER — APPOINTMENT (OUTPATIENT)
Dept: MRI IMAGING | Facility: HOSPITAL | Age: 84
End: 2019-10-17
Payer: MEDICAID

## 2019-10-17 ENCOUNTER — OUTPATIENT (OUTPATIENT)
Dept: OUTPATIENT SERVICES | Facility: HOSPITAL | Age: 84
LOS: 1 days | End: 2019-10-17
Payer: MEDICAID

## 2019-10-17 ENCOUNTER — APPOINTMENT (OUTPATIENT)
Dept: OTOLARYNGOLOGY | Facility: CLINIC | Age: 84
End: 2019-10-17

## 2019-10-17 ENCOUNTER — APPOINTMENT (OUTPATIENT)
Dept: MRI IMAGING | Facility: HOSPITAL | Age: 84
End: 2019-10-17

## 2019-10-17 DIAGNOSIS — R42 DIZZINESS AND GIDDINESS: ICD-10-CM

## 2019-10-17 DIAGNOSIS — Z90.49 ACQUIRED ABSENCE OF OTHER SPECIFIED PARTS OF DIGESTIVE TRACT: Chronic | ICD-10-CM

## 2019-10-17 DIAGNOSIS — H90.42 SENSORINEURAL HEARING LOSS, UNILATERAL, LEFT EAR, WITH UNRESTRICTED HEARING ON THE CONTRALATERAL SIDE: ICD-10-CM

## 2019-10-17 PROCEDURE — 70553 MRI BRAIN STEM W/O & W/DYE: CPT

## 2019-10-17 PROCEDURE — 70553 MRI BRAIN STEM W/O & W/DYE: CPT | Mod: 26

## 2019-10-18 RX ORDER — LOSARTAN/HYDROCHLOROTHIAZIDE 100MG-25MG
1 TABLET ORAL
Qty: 0 | Refills: 0 | DISCHARGE

## 2019-10-18 RX ORDER — AMLODIPINE BESYLATE 2.5 MG/1
1 TABLET ORAL
Qty: 0 | Refills: 0 | DISCHARGE

## 2019-10-18 RX ORDER — ATENOLOL 25 MG/1
1 TABLET ORAL
Qty: 0 | Refills: 0 | DISCHARGE

## 2019-11-05 ENCOUNTER — APPOINTMENT (OUTPATIENT)
Dept: OTOLARYNGOLOGY | Facility: CLINIC | Age: 84
End: 2019-11-05
Payer: MEDICAID

## 2019-11-05 DIAGNOSIS — R42 DIZZINESS AND GIDDINESS: ICD-10-CM

## 2019-11-05 DIAGNOSIS — H90.42 SENSORINEURAL HEARING LOSS, UNILATERAL, LEFT EAR, WITH UNRESTRICTED HEARING ON THE CONTRALATERAL SIDE: ICD-10-CM

## 2019-11-05 PROCEDURE — 99213 OFFICE O/P EST LOW 20 MIN: CPT

## 2019-11-08 PROBLEM — R42 VERTIGO: Status: ACTIVE | Noted: 2019-09-26

## 2019-11-08 PROBLEM — H90.42 LEFT ASYMMETRICAL SNHL: Status: ACTIVE | Noted: 2019-09-26

## 2019-11-26 ENCOUNTER — APPOINTMENT (OUTPATIENT)
Dept: OTOLARYNGOLOGY | Facility: CLINIC | Age: 84
End: 2019-11-26
Payer: MEDICAID

## 2019-11-26 PROCEDURE — 92547 SUPPLEMENTAL ELECTRICAL TEST: CPT

## 2019-11-26 PROCEDURE — 92700A: CUSTOM

## 2019-11-26 PROCEDURE — 92537 CALORIC VSTBLR TEST W/REC: CPT

## 2019-11-26 PROCEDURE — 92567 TYMPANOMETRY: CPT

## 2019-11-26 PROCEDURE — 92540 BASIC VESTIBULAR EVALUATION: CPT

## 2019-12-09 ENCOUNTER — EMERGENCY (EMERGENCY)
Facility: HOSPITAL | Age: 84
LOS: 1 days | Discharge: HOME | End: 2019-12-09
Attending: EMERGENCY MEDICINE
Payer: MEDICAID

## 2019-12-09 VITALS
SYSTOLIC BLOOD PRESSURE: 145 MMHG | OXYGEN SATURATION: 100 % | TEMPERATURE: 98 F | DIASTOLIC BLOOD PRESSURE: 59 MMHG | HEART RATE: 65 BPM | RESPIRATION RATE: 17 BRPM

## 2019-12-09 VITALS
RESPIRATION RATE: 18 BRPM | OXYGEN SATURATION: 99 % | TEMPERATURE: 98 F | HEART RATE: 68 BPM | SYSTOLIC BLOOD PRESSURE: 146 MMHG | HEIGHT: 60 IN | WEIGHT: 145.06 LBS | DIASTOLIC BLOOD PRESSURE: 82 MMHG

## 2019-12-09 DIAGNOSIS — Z90.49 ACQUIRED ABSENCE OF OTHER SPECIFIED PARTS OF DIGESTIVE TRACT: Chronic | ICD-10-CM

## 2019-12-09 LAB
ALBUMIN SERPL ELPH-MCNC: 4 G/DL — SIGNIFICANT CHANGE UP (ref 3.5–5)
ALP SERPL-CCNC: 77 U/L — SIGNIFICANT CHANGE UP (ref 40–120)
ALT FLD-CCNC: 28 U/L DA — SIGNIFICANT CHANGE UP (ref 10–60)
ANION GAP SERPL CALC-SCNC: 4 MMOL/L — LOW (ref 5–17)
APPEARANCE UR: CLEAR — SIGNIFICANT CHANGE UP
AST SERPL-CCNC: 22 U/L — SIGNIFICANT CHANGE UP (ref 10–40)
BASOPHILS # BLD AUTO: 0.04 K/UL — SIGNIFICANT CHANGE UP (ref 0–0.2)
BASOPHILS NFR BLD AUTO: 0.6 % — SIGNIFICANT CHANGE UP (ref 0–2)
BILIRUB SERPL-MCNC: 0.4 MG/DL — SIGNIFICANT CHANGE UP (ref 0.2–1.2)
BILIRUB UR-MCNC: NEGATIVE — SIGNIFICANT CHANGE UP
BUN SERPL-MCNC: 21 MG/DL — HIGH (ref 7–18)
CALCIUM SERPL-MCNC: 9.7 MG/DL — SIGNIFICANT CHANGE UP (ref 8.4–10.5)
CHLORIDE SERPL-SCNC: 108 MMOL/L — SIGNIFICANT CHANGE UP (ref 96–108)
CO2 SERPL-SCNC: 29 MMOL/L — SIGNIFICANT CHANGE UP (ref 22–31)
COLOR SPEC: YELLOW — SIGNIFICANT CHANGE UP
CREAT SERPL-MCNC: 0.89 MG/DL — SIGNIFICANT CHANGE UP (ref 0.5–1.3)
DIFF PNL FLD: ABNORMAL
EOSINOPHIL # BLD AUTO: 0.13 K/UL — SIGNIFICANT CHANGE UP (ref 0–0.5)
EOSINOPHIL NFR BLD AUTO: 1.9 % — SIGNIFICANT CHANGE UP (ref 0–6)
GLUCOSE SERPL-MCNC: 89 MG/DL — SIGNIFICANT CHANGE UP (ref 70–99)
GLUCOSE UR QL: NEGATIVE — SIGNIFICANT CHANGE UP
HCT VFR BLD CALC: 40.3 % — SIGNIFICANT CHANGE UP (ref 34.5–45)
HGB BLD-MCNC: 13.1 G/DL — SIGNIFICANT CHANGE UP (ref 11.5–15.5)
IMM GRANULOCYTES NFR BLD AUTO: 0.6 % — SIGNIFICANT CHANGE UP (ref 0–1.5)
KETONES UR-MCNC: NEGATIVE — SIGNIFICANT CHANGE UP
LEUKOCYTE ESTERASE UR-ACNC: ABNORMAL
LYMPHOCYTES # BLD AUTO: 2.58 K/UL — SIGNIFICANT CHANGE UP (ref 1–3.3)
LYMPHOCYTES # BLD AUTO: 38.1 % — SIGNIFICANT CHANGE UP (ref 13–44)
MCHC RBC-ENTMCNC: 29.8 PG — SIGNIFICANT CHANGE UP (ref 27–34)
MCHC RBC-ENTMCNC: 32.5 GM/DL — SIGNIFICANT CHANGE UP (ref 32–36)
MCV RBC AUTO: 91.8 FL — SIGNIFICANT CHANGE UP (ref 80–100)
MONOCYTES # BLD AUTO: 0.77 K/UL — SIGNIFICANT CHANGE UP (ref 0–0.9)
MONOCYTES NFR BLD AUTO: 11.4 % — SIGNIFICANT CHANGE UP (ref 2–14)
NEUTROPHILS # BLD AUTO: 3.21 K/UL — SIGNIFICANT CHANGE UP (ref 1.8–7.4)
NEUTROPHILS NFR BLD AUTO: 47.4 % — SIGNIFICANT CHANGE UP (ref 43–77)
NITRITE UR-MCNC: NEGATIVE — SIGNIFICANT CHANGE UP
NRBC # BLD: 0 /100 WBCS — SIGNIFICANT CHANGE UP (ref 0–0)
PH UR: 6 — SIGNIFICANT CHANGE UP (ref 5–8)
PLATELET # BLD AUTO: 302 K/UL — SIGNIFICANT CHANGE UP (ref 150–400)
POTASSIUM SERPL-MCNC: 3.5 MMOL/L — SIGNIFICANT CHANGE UP (ref 3.5–5.3)
POTASSIUM SERPL-SCNC: 3.5 MMOL/L — SIGNIFICANT CHANGE UP (ref 3.5–5.3)
PROT SERPL-MCNC: 7.8 G/DL — SIGNIFICANT CHANGE UP (ref 6–8.3)
PROT UR-MCNC: NEGATIVE — SIGNIFICANT CHANGE UP
RBC # BLD: 4.39 M/UL — SIGNIFICANT CHANGE UP (ref 3.8–5.2)
RBC # FLD: 12.5 % — SIGNIFICANT CHANGE UP (ref 10.3–14.5)
SODIUM SERPL-SCNC: 141 MMOL/L — SIGNIFICANT CHANGE UP (ref 135–145)
SP GR SPEC: 1.01 — SIGNIFICANT CHANGE UP (ref 1.01–1.02)
UROBILINOGEN FLD QL: NEGATIVE — SIGNIFICANT CHANGE UP
WBC # BLD: 6.77 K/UL — SIGNIFICANT CHANGE UP (ref 3.8–10.5)
WBC # FLD AUTO: 6.77 K/UL — SIGNIFICANT CHANGE UP (ref 3.8–10.5)

## 2019-12-09 PROCEDURE — 80053 COMPREHEN METABOLIC PANEL: CPT

## 2019-12-09 PROCEDURE — 73562 X-RAY EXAM OF KNEE 3: CPT

## 2019-12-09 PROCEDURE — 36415 COLL VENOUS BLD VENIPUNCTURE: CPT

## 2019-12-09 PROCEDURE — 85027 COMPLETE CBC AUTOMATED: CPT

## 2019-12-09 PROCEDURE — 81001 URINALYSIS AUTO W/SCOPE: CPT

## 2019-12-09 PROCEDURE — 73562 X-RAY EXAM OF KNEE 3: CPT | Mod: 26,RT

## 2019-12-09 PROCEDURE — 87086 URINE CULTURE/COLONY COUNT: CPT

## 2019-12-09 PROCEDURE — 99284 EMERGENCY DEPT VISIT MOD MDM: CPT | Mod: 25

## 2019-12-09 PROCEDURE — 74176 CT ABD & PELVIS W/O CONTRAST: CPT | Mod: 26

## 2019-12-09 PROCEDURE — 99283 EMERGENCY DEPT VISIT LOW MDM: CPT

## 2019-12-09 PROCEDURE — 74176 CT ABD & PELVIS W/O CONTRAST: CPT

## 2019-12-09 RX ORDER — TRAMADOL HYDROCHLORIDE 50 MG/1
50 TABLET ORAL ONCE
Refills: 0 | Status: DISCONTINUED | OUTPATIENT
Start: 2019-12-09 | End: 2019-12-09

## 2019-12-09 RX ORDER — TRAMADOL HYDROCHLORIDE 50 MG/1
1 TABLET ORAL
Qty: 10 | Refills: 0
Start: 2019-12-09

## 2019-12-09 RX ADMIN — TRAMADOL HYDROCHLORIDE 50 MILLIGRAM(S): 50 TABLET ORAL at 11:11

## 2019-12-09 RX ADMIN — TRAMADOL HYDROCHLORIDE 50 MILLIGRAM(S): 50 TABLET ORAL at 10:15

## 2019-12-09 NOTE — ED ADULT NURSE NOTE - OBJECTIVE STATEMENT
presented with c/o lower back with radiation to RLe x days no resent trauma fever/chills dysuria noted

## 2019-12-09 NOTE — ED PROVIDER NOTE - PATIENT PORTAL LINK FT
You can access the FollowMyHealth Patient Portal offered by [facility] by registering at the following website: [email or web address]. By joining [employer]’s FollowMyHealth portal, you will also be able to view your health information using other applications (apps) compatible with our system.

## 2019-12-09 NOTE — ED PROVIDER NOTE - PROGRESS NOTE DETAILS
imaging normal-arthritis, various intrabdominal incidental findings of which patient was informed and instructed to followup with primary care physician. home with tramadol and primary care physician followup. ambulatory without assistance

## 2019-12-09 NOTE — ED PROVIDER NOTE - OBJECTIVE STATEMENT
85 year old male presenting with severe, worsening lower back after lifting thanksgiving turkey two weeks ago. Patient also reports right knee pain. Patient notes that she has been taking Tylenol and Motrin with no relief and that she has never had any pains like this before. Patient denies any other trauma, nausea, vomiting, diarrhea, dysuria, urinary frequency, hematuria, or any other acute complaints.

## 2019-12-09 NOTE — ED PROVIDER NOTE - CLINICAL SUMMARY MEDICAL DECISION MAKING FREE TEXT BOX
Patient with lower back pain after lifting. Will do CAT scan, pain control, order labs, and reassess.

## 2019-12-10 LAB
CULTURE RESULTS: SIGNIFICANT CHANGE UP
SPECIMEN SOURCE: SIGNIFICANT CHANGE UP

## 2020-01-14 ENCOUNTER — APPOINTMENT (OUTPATIENT)
Dept: NEUROLOGY | Facility: CLINIC | Age: 85
End: 2020-01-14
Payer: MEDICAID

## 2020-01-14 ENCOUNTER — LABORATORY RESULT (OUTPATIENT)
Age: 85
End: 2020-01-14

## 2020-01-14 VITALS
DIASTOLIC BLOOD PRESSURE: 82 MMHG | OXYGEN SATURATION: 100 % | WEIGHT: 143 LBS | HEART RATE: 80 BPM | BODY MASS INDEX: 27.93 KG/M2 | TEMPERATURE: 98.2 F | SYSTOLIC BLOOD PRESSURE: 132 MMHG

## 2020-01-14 PROCEDURE — 99204 OFFICE O/P NEW MOD 45 MIN: CPT

## 2020-01-14 NOTE — DISCUSSION/SUMMARY
[FreeTextEntry1] : Reviewed MRI MRA and ENG reports;\par TIA and microvascular ischemic disease have to be distinguished from migraine with aura and temporal arteritis; plan ESR CRP, Doppler carotids; also because of the strong family history of cancer check paraneoplastic profile

## 2020-01-14 NOTE — HISTORY OF PRESENT ILLNESS
[FreeTextEntry1] : Admitted to [facility] for headache, and vertigo that lasted for two weeks in hospital and improved she had no evidence of stroke although she does have hypertension and few abnormal signals on the MRI. She had a similar episode lasting less that a day a year before in 2018.

## 2020-01-14 NOTE — PHYSICAL EXAM
[General Appearance - Alert] : alert [General Appearance - In No Acute Distress] : in no acute distress [General Appearance - Well Nourished] : well nourished [General Appearance - Well Developed] : well developed [General Appearance - Well-Appearing] : healthy appearing [Oriented To Time, Place, And Person] : oriented to person, place, and time [Mood] : the mood was normal [Impaired Insight] : insight and judgment were intact [Affect] : the affect was normal [Memory Remote] : remote memory was not impaired [Memory Recent] : recent memory was not impaired [Person] : oriented to person [Time] : oriented to time [Place] : oriented to place [Short Term Intact] : short term memory intact [Remote Intact] : remote memory intact [Registration Intact] : recent registration memory intact [Concentration Intact] : normal concentrating ability [Span Intact] : the attention span was normal [Visual Intact] : visual attention was ~T not ~L decreased [Repeating Phrases] : no difficulty repeating a phrase [Writing A Sentence] : no difficulty writing a sentence [Naming Objects] : no difficulty naming common objects [Comprehension] : comprehension intact [Fluency] : fluency intact [Reading] : reading intact [Vocabulary] : adequate range of vocabulary [Current Events] : adequate knowledge of current events [Past History] : adequate knowledge of personal past history [Cranial Nerves Oculomotor (III)] : extraocular motion intact [I: Normal Smell] : smell intact bilaterally [Cranial Nerves Optic (II)] : visual acuity intact bilaterally,  visual fields full to confrontation, pupils equal round and reactive to light [Cranial Nerves Facial (VII)] : face symmetrical [Cranial Nerves Trigeminal (V)] : facial sensation intact symmetrically [Cranial Nerves Vestibulocochlear (VIII)] : hearing was intact bilaterally [Cranial Nerves Accessory (XI - Cranial And Spinal)] : head turning and shoulder shrug symmetric [Cranial Nerves Hypoglossal (XII)] : there was no tongue deviation with protrusion [Cranial Nerves Glossopharyngeal (IX)] : tongue and palate midline [Involuntary Movements] : no involuntary movements were seen [Motor Handedness Right-Handed] : the patient is right hand dominant [Motor Strength] : muscle strength was normal in all four extremities [Sensation Tactile Decrease] : light touch was intact [Sensation Pain / Temperature Decrease] : pain and temperature was intact [Proprioception] : proprioception was intact [2+] : Patella right 2+ [1+] : Ankle jerk left 1+ [PERRL With Normal Accommodation] : pupils were equal in size, round, reactive to light, with normal accommodation [Sclera] : the sclera and conjunctiva were normal [Extraocular Movements] : extraocular movements were intact [Outer Ear] : the ears and nose were normal in appearance [Oropharynx] : the oropharynx was normal [Neck Appearance] : the appearance of the neck was normal [Neck Cervical Mass (___cm)] : no neck mass was observed [Jugular Venous Distention Increased] : there was no jugular-venous distention [Thyroid Diffuse Enlargement] : the thyroid was not enlarged [Thyroid Nodule] : there were no palpable thyroid nodules [Heart Rate And Rhythm] : heart rate was normal and rhythm regular [Auscultation Breath Sounds / Voice Sounds] : lungs were clear to auscultation bilaterally [Murmurs] : no murmurs [Heart Sounds] : normal S1 and S2 [Heart Sounds Gallop] : no gallops [Heart Sounds Pericardial Friction Rub] : no pericardial rub [Bowel Sounds] : normal bowel sounds [Full Pulse] : the pedal pulses are present [Edema] : there was no peripheral edema [Abdomen Soft] : soft [Abdomen Tenderness] : non-tender [Abdomen Mass (___ Cm)] : no abdominal mass palpated [No CVA Tenderness] : no ~M costovertebral angle tenderness [] : no hepato-splenomegaly [Abnormal Walk] : normal gait [No Spinal Tenderness] : no spinal tenderness [Musculoskeletal - Swelling] : no joint swelling seen [Nail Clubbing] : no clubbing  or cyanosis of the fingernails [Motor Tone] : muscle strength and tone were normal [Paresis Pronator Drift Left-Sided] : no pronator drift on the left [Paresis Pronator Drift Right-Sided] : no pronator drift on the right [Motor Strength Lower Extremities Bilaterally] : strength was normal in both lower extremities [Motor Strength Upper Extremities Bilaterally] : strength was normal in both upper extremities [Allodynia] : no ~T allodynia present [Romberg's Sign] : Romberg's sign was negtive [Dysesthesia] : no dysesthesia [Hyperesthesia] : no hyperesthesia [Limited Balance] : balance was intact [Past-pointing] : there was no past-pointing [Dysdiadochokinesia Bilaterally] : not present [Tremor] : no tremor present [Coordination - Dysmetria Impaired Finger-to-Nose Bilateral] : not present [Coordination - Dysmetria Impaired Heel-to-Shin Bilateral] : not present [Plantar Reflex Right Only] : normal on the right [___] : absent on the left [Plantar Reflex Left Only] : normal on the left [___] : absent on the right [Primitive Reflexes] : primitive reflexes were absent

## 2020-01-14 NOTE — REVIEW OF SYSTEMS
[Feeling Poorly] : feeling poorly [Feeling Tired] : feeling tired [Poor Coordination] : poor coordination [Dizziness] : dizziness [Vertigo] : vertigo [Difficulty Walking] : difficulty walking [Migraine Headache] : migraine headaches [Ataxia] : ataxia [Fever] : no fever [Chills] : no chills [Recent Weight Gain (___ Lbs)] : no recent weight gain [Confused or Disoriented] : no confusion [Memory Lapses or Loss] : no memory loss [Recent Weight Loss (___ Lbs)] : no recent weight loss [Decr. Concentrating Ability] : no decrease in concentrating ability [Difficulty with Language] : no ~M difficulty with language [Changed Thought Patterns] : no change in thought patterns [Facial Weakness] : no facial weakness [Repeating Questions] : no repeated questioning about recent events [Hand Weakness] : no hand weakness [Arm Weakness] : no arm weakness [Leg Weakness] : no leg weakness [Difficulty Writing] : no difficulty writing [Numbness] : no numbness [Difficulties in Speech] : no speech difficulties [Abnormal Sensation] : no abnormal sensation [Hypersensitivity] : no hypersensitivity [Tingling] : no tingling [Fainting] : no fainting [Lightheadedness] : no lightheadedness [Seizures] : no convulsions [Tension Headache] : no tension-type headache [Inability to Walk] : able to walk [Cluster Headache] : no cluster headache [Suicidal] : not suicidal [Frequent Falls] : not falling [Limping] : not limping [Sleep Disturbances] : no sleep disturbances [Anxiety] : no anxiety [Depression] : no depression [Eye Pain] : no eye pain [Emotional Problems] : no emotional problems [Red Eyes] : eyes not red [Change In Personality] : no personality change [Discharge From Eyes] : no purulent discharge from the eyes [Eyesight Problems] : no eyesight problems [Eyes Itch] : no itching of the eyes [Dry Eyes] : no dryness of the eyes [Earache] : no earache [Loss Of Hearing] : no hearing loss [Nosebleeds] : no nosebleeds [Nasal Discharge] : no nasal discharge [Sore Throat] : no sore throat [Heart Rate Is Slow] : the heart rate was not slow [Hoarseness] : no hoarseness [Chest Pain] : no chest pain [Palpitations] : no palpitations [Heart Rate Is Fast] : the heart rate was not fast [Leg Claudication] : no intermittent leg claudication [Shortness Of Breath] : no shortness of breath [Lower Ext Edema] : no lower extremity edema [SOB on Exertion] : no shortness of breath during exertion [Wheezing] : no wheezing [Cough] : no cough [Abdominal Pain] : no abdominal pain [PND] : no PND [Constipation] : no constipation [Vomiting] : no vomiting [Diarrhea] : no diarrhea [Heartburn] : no heartburn [Melena] : no melena [Dysuria] : no dysuria [Incontinence] : no incontinence [Arthralgias] : no arthralgias [Joint Pain] : no joint pain [Joint Swelling] : no joint swelling [Joint Stiffness] : no joint stiffness [Limb Pain] : no limb pain [Limb Swelling] : no limb swelling [Skin Lesions] : no skin lesions [Change In A Mole] : no change in a mole [Itching] : no itching [Breast Lump] : no breast lump [Breast Pain] : no breast pain [Proptosis] : no proptosis [Hot Flashes] : no hot flashes [Muscle Weakness] : no muscle weakness [Feelings Of Weakness] : no feelings of weakness [Deepening Of The Voice] : no deepening of the voice [Easy Bleeding] : no tendency for easy bleeding [Swollen Glands] : no swollen glands [Swollen Glands In The Neck] : no swollen glands in the neck [Easy Bruising] : no tendency for easy bruising

## 2020-01-15 ENCOUNTER — APPOINTMENT (OUTPATIENT)
Dept: GASTROENTEROLOGY | Facility: CLINIC | Age: 85
End: 2020-01-15
Payer: MEDICAID

## 2020-01-15 VITALS
BODY MASS INDEX: 28.07 KG/M2 | OXYGEN SATURATION: 98 % | DIASTOLIC BLOOD PRESSURE: 76 MMHG | HEIGHT: 60 IN | TEMPERATURE: 98.2 F | WEIGHT: 143 LBS | HEART RATE: 83 BPM | SYSTOLIC BLOOD PRESSURE: 115 MMHG

## 2020-01-15 DIAGNOSIS — R10.11 RIGHT UPPER QUADRANT PAIN: ICD-10-CM

## 2020-01-15 DIAGNOSIS — R19.8 OTHER SPECIFIED SYMPTOMS AND SIGNS INVOLVING THE DIGESTIVE SYSTEM AND ABDOMEN: ICD-10-CM

## 2020-01-15 PROCEDURE — 99204 OFFICE O/P NEW MOD 45 MIN: CPT

## 2020-01-15 RX ORDER — ROSUVASTATIN CALCIUM 10 MG/1
10 TABLET, FILM COATED ORAL
Refills: 0 | Status: ACTIVE | COMMUNITY

## 2020-01-15 RX ORDER — FAMOTIDINE 40 MG/1
40 TABLET, FILM COATED ORAL TWICE DAILY
Qty: 60 | Refills: 3 | Status: ACTIVE | COMMUNITY
Start: 2020-01-15 | End: 1900-01-01

## 2020-01-15 NOTE — HISTORY OF PRESENT ILLNESS
[None] : had no significant interval events [Nausea] : denies nausea [Vomiting] : denies vomiting [Yellow Skin Or Eyes (Jaundice)] : denies jaundice [Rectal Pain] : denies rectal pain [Heartburn] : heartburn [Diarrhea] : diarrhea [Constipation] : constipation [Abdominal Pain] : abdominal pain [Abdominal Swelling] : abdominal swelling [GERD] : gastroesophageal reflux disease [Cholelithiasis] : cholelithiasis [Cholecystectomy] : cholecystectomy [Wt Gain ___ Lbs] : no recent weight gain [Wt Loss ___ Lbs] : no recent weight loss [Hiatus Hernia] : no hiatus hernia [Peptic Ulcer Disease] : no peptic ulcer disease [Pancreatitis] : no pancreatitis [Kidney Stone] : no kidney stone [Inflammatory Bowel Disease] : no inflammatory bowel disease [Irritable Bowel Syndrome] : no irritable bowel syndrome [Alcohol Abuse] : no alcohol abuse [Diverticulitis] : no diverticulitis [Abdominal Surgery] : no abdominal surgery [Malignancy] : no malignancy [Appendectomy] : no appendectomy [de-identified] : The patient is an 85-year-old  female with past medical history significant for hypertension and hypercholesterolemia who was referred to my office by Dr. Luis Manuel Rubin for an abnormal CT and MRCP to rule out choledocholithiasis.  The patient also admits to having  abdominal pain, dyspepsia, gastroesophageal reflux disease, alternating diarrhea/constipation, change in bowel habits and change in caliber of stool. I was asked to render an opinion for consultation for the above complaints.   The patient states that she is feeling better.   The patient was recently evaluated at the [facility] emergency room on 2019 for back pain.   The patient had blood work and imaging studies to assess the symptoms.  The blood work performed was remarkable for an elevated BUN of 21 mg/dl and normal liver enzymes.  The CAT scan of the abdomen and pelvis with IV contrast performed on 2019 revealed a 3mm distal CBD calcific density possibly a ductal calculus.  Also noted was colonic diverticulosis without CT evidence of acute diverticulitis and no urolithiasis or hydronephrosis. The patient was observed with resolution of the symptoms and was discharged to followup in the office.  I reviewed the blood work and imaging studies performed in the emergency room.  The MRCP performed on 2019 revealed s/p cholecystectomy with stable 9 mm dilated CBD.  There are now 2 small distal CBD calculi measuring 4 mm.  Also noted was a stable 1 cm maximal longitudinal dimension medial pancreatic head cyst.  Also noted was hepatic steatosis.  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   The patient complains of abdominal pain.  The patient describes the abdominal pain as a sharp, crampy, intermittent midepigastric and right upper quadrant discomfort that is nonradiating in nature.  The abdominal pain is unrelated to meals.  The abdominal pain improves with passing gas or having a bowel movement.  The abdominal pain is described as being mild in nature.  The abdominal pain occurs at night and in the morning.  The abdominal pain can occur at any time.   The abdominal pain never has awakened the patient from sleep.  The abdominal pain is not relieved with any medications.  The abdominal pain is associated with abdominal gas and bloating.  The patient denies any nausea or vomiting.  The patient complains of gastroesophageal reflux disease but denies any dysphagia. The gastroesophageal reflux disease is worse after meals and late at night and in the early morning. The gastroesophageal reflux disease is improved with proton pump inhibitors, H2 blockers and antacids.  The patient denies any atypical chest pain, shortness of breath or palpitations.  The patient denies any diaphoresis. The patient complains of alternating diarrhea/constipation.  The patient has 1 to 2 bowel movements a day.  The patient complains of a change in bowel habits.  The patient complains of a change in caliber of stool.   The diarrhea is described as soft in nature.  The patient denies having mucus discharge with the bowel movements.  The patient denies any bright red blood per rectum, melena or hematemesis.  The patient denies any rectal pain or rectal pruritus. The patient complains of anorexia but denies any weight loss.  She denies any fevers or chills.  The patient denies any jaundice or pruritus.  The patient complains of occasional lower back pain.  The patient admits to having a prior upper endoscopy and colonoscopy performed by another gastroenterologist.  According to the patient, the upper endoscopic findings revealed gastritis.  The colonoscopic findings were unknown to the patient.   The patient's last menstrual period was age 55. The patient is a .  The patient's first menstrual period was at age 14. The patient denies any significant family history of GI problems.  The patient admits to a family history of GI problems.  The patient’s mother had a history of pancreatic cancer at age 65.

## 2020-01-15 NOTE — REVIEW OF SYSTEMS
[Eyesight Problems] : eyesight problems [Abdominal Pain] : abdominal pain [Constipation] : constipation [Diarrhea] : diarrhea [Heartburn] : heartburn [Itching] : itching [Dizziness] : dizziness [Negative] : Heme/Lymph [FreeTextEntry8] : nocturia [de-identified] : dry skin [de-identified] : unsteady gait

## 2020-01-15 NOTE — ASSESSMENT
[FreeTextEntry1] : Abdominal Pain: The patient complains of abdominal pain. The patient is to avoid nonsteroidal anti-inflammatory drugs and aspirin.  I recommend a trial of Phazyme 1 tablet PO 3 times a day for 3 months for the symptoms.\par Dyspepsia: The patient complains of dyspeptic symptoms.  The patient was advised to abide by an anti-gas diet.  The patient was given a pamphlet for anti-gas.  The patient and I reviewed the anti-gas diet at length. The patient is to start on a trial of Phazyme one tablet 3 times a day p.r.n. abdominal pain and gas.\par GERD: The patient was advised to avoid late-night meals and dietary indiscretions.  The patient was advised to avoid fried and fatty foods.  The patient was advised to abide by an anti-GERD diet. The patient was given a pamphlet for anti-GERD.  The patient and I reviewed the anti-GERD diet at length. I recommend a trial of Pepcid 40 mg twice a day x 3 months for the symptoms.\par Abnormal Imaging Studies: The CAT scan of the abdomen and pelvis with IV contrast performed on December 9, 2019 revealed a 3mm distal CBD calcific density possibly a ductal calculus.  Also noted was colonic diverticulosis without CT evidence of acute diverticulitis and no urolithiasis or hydronephrosis. The MRCP performed on December 30, 2019 revealed s/p cholecystectomy with stable 9 mm dilated CBD.  There are now 2 small distal CBD calculi measuring 4 mm.  Also noted was a stable 1 cm maximal longitudinal dimension medial pancreatic head cyst.  Also noted was hepatic steatosis.  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   \par Choledocholithiasis:  The patient was found to have an abnormal study suggestive of choledocholithiasis.  I recommend to an ERCP with possible therapeutics to assess for choledocholithiasis.  The patient was told of the risks and benefits of the procedure.  The patient was told of the risks of perforation, emergency surgery, bleeding, infection, missed lesions and pancreatitis.  The patient agreed and will follow up for the procedure.  The patient is to follow-up with Dr. Teresa Jefferson at [facility] in [facility] for the ERCP.   The patient was advised to follow up in my office after evaluation for the ERCP.\par Fatty Liver:  The patient had an imaging study suggestive of fatty infiltration of the liver.  The patient denies any jaundice or pruritus.  The patient denies any alcohol use.  The patient denies taking large doses of nonsteroidal anti-inflammatory drugs or acetaminophen.  The findings are suggestive of fatty liver.  The patient and I had a long discussion regarding the risks of fatty liver and possible progression to cirrhosis.  The patient was told of the possible increased risk of developing liver failure, cirrhosis, ascites, GI bleeding secondary to varices, hepatic encephalopathy, bleeding tendencies and liver cancer.  The patient was told of the importance of follow-up.  The patient was advised to follow up every 6 months for blood work and imaging studies. The patient agreed and will follow up. The patient was advised to lose weight. I recommend a trial of vitamin E supplementation for the fatty liver.  If the liver enzymes remain elevated, the patient may require a trial of Pioglitazone for the fatty liver. I recommend avoid alcohol and hepato-toxic agents.  The patient was also advised to avoid NSAIDs, Acetaminophen and any other hepatotoxic drugs. The patient was also advised not to share needles, razors, scissors, nail clippers, etc.. The patient is to continue close follow-up in our office for blood work and exams.  If the liver enzymes remain elevated, the patient may require a CT guided liver biopsy to assess the liver parenchyma for possible treatment.  We had a long discussion regarding the risks and benefits of the procedure.  The patient was told of the risks of bleeding, perforation, infections, emergency surgery and missing lesions.  The patient agreed and will follow-up to reassess the symptoms.  \par Pancreatic Cystic Lesion: The patient was recently diagnosed with a pancreatic cystic lesion on recent MRCP.  The patient complained of prior back pain.  She denies any jaundice, pruritus.  The patient currently denies any abdominal pain.  The patient denies any prior history of EtOH abuse.  I recommend a repeat MRI of the pancreas in one year to reassess the pancreatic cystic lesions.  The patient and I had a long discussion regarding the potential risks of pancreatic cysts progressing to pancreatic cancer.  If the MRI reveals a change in the pancreatic cystic lesion, the patient may require an endoscopic ultrasound of pancreas with possible fine-needle aspiration to better assess the pancreatic cystic lesions.  The patient is aware of the importance for followup.  The patient agrees and will followup.\par Alternating Diarrhea/Constipation: The patient complains of alternating diarrhea/constipation. I recommend a high-fiber diet. I recommend a trial of a probiotic such as Align once a day. I recommend a trial of Metamucil once a day for fiber supplementation. I recommend a trial of Linzess 145 mcg once a day for the constipation. If the symptoms persist, the patient may re  The patient agreed and will followup to reassess the symptoms.  \par Prior Endoscopic Procedures: The patient had a prior upper endoscopy and colonoscopy performed by another gastroenterologist.  I will try to obtain the prior upper endoscopy and colonoscopy reports.  The patient is to sign a record release to obtain those records.\par Blood Work:   I also recommend obtaining the recent blood work performed by the patient's PMD.\par Follow-up: The patient is to follow-up in the office in 4 weeks to reassess the symptoms. The patient was told to call the office if any further problems. \par \par \par

## 2020-01-16 LAB — HEMOCCULT STL QL: NEGATIVE

## 2020-01-17 ENCOUNTER — APPOINTMENT (OUTPATIENT)
Dept: UROLOGY | Facility: CLINIC | Age: 85
End: 2020-01-17
Payer: MEDICAID

## 2020-01-17 VITALS
SYSTOLIC BLOOD PRESSURE: 128 MMHG | DIASTOLIC BLOOD PRESSURE: 74 MMHG | BODY MASS INDEX: 28.07 KG/M2 | HEART RATE: 90 BPM | WEIGHT: 143 LBS | HEIGHT: 60 IN

## 2020-01-17 DIAGNOSIS — G11.9 HEREDITARY ATAXIA, UNSPECIFIED: ICD-10-CM

## 2020-01-17 DIAGNOSIS — K21.9 GASTRO-ESOPHAGEAL REFLUX DISEASE W/OUT ESOPHAGITIS: ICD-10-CM

## 2020-01-17 DIAGNOSIS — N28.1 CYST OF KIDNEY, ACQUIRED: ICD-10-CM

## 2020-01-17 DIAGNOSIS — Z78.9 OTHER SPECIFIED HEALTH STATUS: ICD-10-CM

## 2020-01-17 PROCEDURE — 99204 OFFICE O/P NEW MOD 45 MIN: CPT

## 2020-01-17 NOTE — HISTORY OF PRESENT ILLNESS
[FreeTextEntry1] : Very pleasant 84-year-old woman who presents for evaluation of left renal cyst. Patient recently underwent a CT scan, and subsequently a abdominal ultrasound as well as an MRI, each of which demonstrated an approximately 3.8-3.9 cm superior left renal cyst. Patient denies flank pain. No hematuria. No dysuria. No nausea or vomiting. No specific timing. No aggravating or alleviating factors that she knows of. No other complaints. [None] : no symptoms

## 2020-01-17 NOTE — ASSESSMENT
[FreeTextEntry1] : Very pleasant 84-year-old woman presents for evaluation of left renal cyst\par -CT images reviewed with the patient\par -MRI reviewed\par -Ultrasound reviewed\par -Prior labs reviewed\par -We discussed the benign nature of simple renal cysts\par -Follow up in one year with renal ultrasound

## 2020-01-17 NOTE — REVIEW OF SYSTEMS
[Heart Rate Is Fast] : fast heart rate [Cough] : cough [see HPI] : see HPI [both] : pain during and after intercourse [denies] : denies pain with orgasm [Difficulty Walking] : difficulty walking [Negative] : Heme/Lymph

## 2020-01-17 NOTE — PHYSICAL EXAM
[General Appearance - Well Developed] : well developed [Normal Appearance] : normal appearance [General Appearance - Well Nourished] : well nourished [Well Groomed] : well groomed [General Appearance - In No Acute Distress] : no acute distress [Edema] : no peripheral edema [Abdomen Soft] : soft [Respiration, Rhythm And Depth] : normal respiratory rhythm and effort [Exaggerated Use Of Accessory Muscles For Inspiration] : no accessory muscle use [Costovertebral Angle Tenderness] : no ~M costovertebral angle tenderness [Abdomen Tenderness] : non-tender [Urinary Bladder Findings] : the bladder was normal on palpation [Normal Station and Gait] : the gait and station were normal for the patient's age [No Focal Deficits] : no focal deficits [] : no rash [Affect] : the affect was normal [Oriented To Time, Place, And Person] : oriented to person, place, and time [Mood] : the mood was normal [Not Anxious] : not anxious [No Palpable Adenopathy] : no palpable adenopathy

## 2020-01-28 ENCOUNTER — APPOINTMENT (OUTPATIENT)
Dept: OTOLARYNGOLOGY | Facility: CLINIC | Age: 85
End: 2020-01-28

## 2020-02-05 ENCOUNTER — FORM ENCOUNTER (OUTPATIENT)
Age: 85
End: 2020-02-05

## 2020-02-06 ENCOUNTER — APPOINTMENT (OUTPATIENT)
Dept: ULTRASOUND IMAGING | Facility: HOSPITAL | Age: 85
End: 2020-02-06
Payer: MEDICAID

## 2020-02-06 ENCOUNTER — OUTPATIENT (OUTPATIENT)
Dept: OUTPATIENT SERVICES | Facility: HOSPITAL | Age: 85
LOS: 1 days | End: 2020-02-06
Payer: MEDICAID

## 2020-02-06 DIAGNOSIS — Z90.49 ACQUIRED ABSENCE OF OTHER SPECIFIED PARTS OF DIGESTIVE TRACT: Chronic | ICD-10-CM

## 2020-02-06 DIAGNOSIS — M31.6 OTHER GIANT CELL ARTERITIS: ICD-10-CM

## 2020-02-06 DIAGNOSIS — R42 DIZZINESS AND GIDDINESS: ICD-10-CM

## 2020-02-06 PROCEDURE — 93880 EXTRACRANIAL BILAT STUDY: CPT

## 2020-02-06 PROCEDURE — 93880 EXTRACRANIAL BILAT STUDY: CPT | Mod: 26

## 2020-02-11 ENCOUNTER — APPOINTMENT (OUTPATIENT)
Dept: OBGYN | Facility: CLINIC | Age: 85
End: 2020-02-11

## 2020-02-11 VITALS
OXYGEN SATURATION: 100 % | DIASTOLIC BLOOD PRESSURE: 82 MMHG | BODY MASS INDEX: 28.47 KG/M2 | HEIGHT: 60 IN | TEMPERATURE: 97.6 F | HEART RATE: 76 BPM | SYSTOLIC BLOOD PRESSURE: 151 MMHG | WEIGHT: 145 LBS

## 2020-02-18 ENCOUNTER — APPOINTMENT (OUTPATIENT)
Dept: NEUROLOGY | Facility: CLINIC | Age: 85
End: 2020-02-18
Payer: MEDICAID

## 2020-02-18 VITALS
OXYGEN SATURATION: 90 % | WEIGHT: 141 LBS | SYSTOLIC BLOOD PRESSURE: 122 MMHG | BODY MASS INDEX: 27.68 KG/M2 | TEMPERATURE: 97.7 F | DIASTOLIC BLOOD PRESSURE: 72 MMHG | HEART RATE: 76 BPM | HEIGHT: 60 IN

## 2020-02-18 DIAGNOSIS — G43.119 MIGRAINE WITH AURA, INTRACTABLE, W/OUT STATUS MIGRAINOSUS: ICD-10-CM

## 2020-02-18 DIAGNOSIS — R29.6 REPEATED FALLS: ICD-10-CM

## 2020-02-18 PROCEDURE — 99214 OFFICE O/P EST MOD 30 MIN: CPT

## 2020-02-18 RX ORDER — OMEGA-3/DHA/EPA/FISH OIL 300-1000MG
400 CAPSULE ORAL
Qty: 100 | Refills: 0 | Status: ACTIVE | COMMUNITY
Start: 2020-02-18 | End: 1900-01-01

## 2020-02-18 RX ORDER — RIBOFLAVIN (VITAMIN B2) 400 MG
400 TABLET ORAL
Qty: 100 | Refills: 0 | Status: ACTIVE | COMMUNITY
Start: 2020-02-18 | End: 1900-01-01

## 2020-02-18 NOTE — PHYSICAL EXAM
[General Appearance - Alert] : alert [General Appearance - In No Acute Distress] : in no acute distress [General Appearance - Well Nourished] : well nourished [General Appearance - Well Developed] : well developed [General Appearance - Well-Appearing] : healthy appearing [Oriented To Time, Place, And Person] : oriented to person, place, and time [Impaired Insight] : insight and judgment were intact [Affect] : the affect was normal [Mood] : the mood was normal [Memory Recent] : recent memory was not impaired [Memory Remote] : remote memory was not impaired [Person] : oriented to person [Place] : oriented to place [Time] : oriented to time [Short Term Intact] : short term memory intact [Remote Intact] : remote memory intact [Registration Intact] : recent registration memory intact [Span Intact] : the attention span was normal [Concentration Intact] : normal concentrating ability [Visual Intact] : visual attention was ~T not ~L decreased [Naming Objects] : no difficulty naming common objects [Repeating Phrases] : no difficulty repeating a phrase [Writing A Sentence] : no difficulty writing a sentence [Fluency] : fluency intact [Comprehension] : comprehension intact [Reading] : reading intact [Current Events] : adequate knowledge of current events [Past History] : adequate knowledge of personal past history [Vocabulary] : adequate range of vocabulary [I: Normal Smell] : smell intact bilaterally [Cranial Nerves Optic (II)] : visual acuity intact bilaterally,  visual fields full to confrontation, pupils equal round and reactive to light [Cranial Nerves Oculomotor (III)] : extraocular motion intact [Cranial Nerves Trigeminal (V)] : facial sensation intact symmetrically [Cranial Nerves Facial (VII)] : face symmetrical [Cranial Nerves Vestibulocochlear (VIII)] : hearing was intact bilaterally [Cranial Nerves Glossopharyngeal (IX)] : tongue and palate midline [Cranial Nerves Accessory (XI - Cranial And Spinal)] : head turning and shoulder shrug symmetric [Cranial Nerves Hypoglossal (XII)] : there was no tongue deviation with protrusion [Motor Strength] : muscle strength was normal in all four extremities [Involuntary Movements] : no involuntary movements were seen [Motor Handedness Right-Handed] : the patient is right hand dominant [Sensation Tactile Decrease] : light touch was intact [Sensation Pain / Temperature Decrease] : pain and temperature was intact [Proprioception] : proprioception was intact [Limited Balance] : the patient's balance was impaired [2+] : Patella left 2+ [1+] : Ankle jerk left 1+ [Sclera] : the sclera and conjunctiva were normal [PERRL With Normal Accommodation] : pupils were equal in size, round, reactive to light, with normal accommodation [Extraocular Movements] : extraocular movements were intact [Outer Ear] : the ears and nose were normal in appearance [Oropharynx] : the oropharynx was normal [Neck Appearance] : the appearance of the neck was normal [Neck Cervical Mass (___cm)] : no neck mass was observed [Jugular Venous Distention Increased] : there was no jugular-venous distention [Thyroid Diffuse Enlargement] : the thyroid was not enlarged [Thyroid Nodule] : there were no palpable thyroid nodules [Auscultation Breath Sounds / Voice Sounds] : lungs were clear to auscultation bilaterally [Heart Rate And Rhythm] : heart rate was normal and rhythm regular [Heart Sounds] : normal S1 and S2 [Murmurs] : no murmurs [Heart Sounds Gallop] : no gallops [Heart Sounds Pericardial Friction Rub] : no pericardial rub [Full Pulse] : the pedal pulses are present [Edema] : there was no peripheral edema [Bowel Sounds] : normal bowel sounds [Abdomen Soft] : soft [Abdomen Tenderness] : non-tender [] : no hepato-splenomegaly [Abdomen Mass (___ Cm)] : no abdominal mass palpated [No CVA Tenderness] : no ~M costovertebral angle tenderness [No Spinal Tenderness] : no spinal tenderness [Abnormal Walk] : normal gait [Nail Clubbing] : no clubbing  or cyanosis of the fingernails [Musculoskeletal - Swelling] : no joint swelling seen [Motor Tone] : muscle strength and tone were normal [Paresis Pronator Drift Right-Sided] : no pronator drift on the right [Paresis Pronator Drift Left-Sided] : no pronator drift on the left [Motor Strength Upper Extremities Bilaterally] : strength was normal in both upper extremities [Motor Strength Lower Extremities Bilaterally] : strength was normal in both lower extremities [Romberg's Sign] : Romberg's sign was negtive [Allodynia] : no ~T allodynia present [Dysesthesia] : no dysesthesia [Hyperesthesia] : no hyperesthesia [Past-pointing] : there was no past-pointing [Tremor] : no tremor present [Dysdiadochokinesia Bilaterally] : not present [Coordination - Dysmetria Impaired Finger-to-Nose Bilateral] : not present [Coordination - Dysmetria Impaired Heel-to-Shin Bilateral] : not present [Plantar Reflex Right Only] : normal on the right [Plantar Reflex Left Only] : normal on the left [___] : absent on the right [___] : absent on the left [Primitive Reflexes] : primitive reflexes were absent [FreeTextEntry8] : Unable to walk tandem and retropulsion +

## 2020-02-18 NOTE — DATA REVIEWED
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 \par \par TECHNIQUE: Multi-planar multi-sequential MR imaging of the brain with \par special attention to the internal auditory canals was performed before and \par after the intravenous administration of 7.5 ml of Gadavist. \par \par COMPARISON: Brain MRI 5/12/2019. \par \par FINDINGS: \par \par Evaluation of the internal auditory canals and cerebellopontine angle \par cisterns reveals no mass and no abnormal enhancement. The cisternal and \par canalicular portions of the seventh and eighth cranial nerves are normal. \par The bilateral vestibulocochlear complexes appear intact. \par \par There are scattered T2/FLAIR hyperintense foci in the periventricular and \par subcortical white matter which are nonspecific finding, but most likely \par represent sequelae of very mild chronic microvascular ischemic disease. \par There is mild diffuse cortical sulcal prominence related to underlying brain \par parenchymal volume loss. \par \par No acute infarction or intracranial hemorrhage. No abnormal intraparenchymal \par enhancement is identified. \par \par The ventricles are normal without evidence of hydrocephalus. There are no \par extra-axial fluid collections. The skull base flow voids are present. \par \par The patient is status post bilateral lens replacement. Visualized \par intraorbital contents are otherwise unremarkable. There is a small \par polyp/mucus retention cyst in the right maxillary sinus. Mild mucosal \par inflammatory changes of the ethmoid air cells. The mastoid air cells are are \par grossly clear. The other visualized soft tissues and osseous structures \par appear otherwise unremarkable. \par \par IMPRESSION: \par \par Normal contrast-enhanced MRI evaluation of the internal auditory canals and \par cerebellopontine angle cisterns bilaterally. \par \par \par \par \par \par \par \par ADRY LEACH M.D., ATTENDING RADIOLOGIST \par This document has been electronically signed. Oct 17 2019 2:26PM \par

## 2020-02-18 NOTE — REVIEW OF SYSTEMS
[Feeling Poorly] : feeling poorly [Feeling Tired] : feeling tired [Poor Coordination] : poor coordination [Dizziness] : dizziness [Vertigo] : vertigo [Migraine Headache] : migraine headaches [Difficulty Walking] : difficulty walking [Ataxia] : ataxia [Fever] : no fever [Chills] : no chills [Recent Weight Gain (___ Lbs)] : no recent weight gain [Recent Weight Loss (___ Lbs)] : no recent weight loss [Confused or Disoriented] : no confusion [Memory Lapses or Loss] : no memory loss [Decr. Concentrating Ability] : no decrease in concentrating ability [Difficulty with Language] : no ~M difficulty with language [Changed Thought Patterns] : no change in thought patterns [Repeating Questions] : no repeated questioning about recent events [Facial Weakness] : no facial weakness [Arm Weakness] : no arm weakness [Hand Weakness] : no hand weakness [Leg Weakness] : no leg weakness [Difficulty Writing] : no difficulty writing [Difficulties in Speech] : no speech difficulties [Numbness] : no numbness [Tingling] : no tingling [Abnormal Sensation] : no abnormal sensation [Hypersensitivity] : no hypersensitivity [Seizures] : no convulsions [Fainting] : no fainting [Lightheadedness] : no lightheadedness [Cluster Headache] : no cluster headache [Tension Headache] : no tension-type headache [Inability to Walk] : able to walk [Frequent Falls] : not falling [Limping] : not limping [Suicidal] : not suicidal [Sleep Disturbances] : no sleep disturbances [Depression] : no depression [Anxiety] : no anxiety [Change In Personality] : no personality change [Emotional Problems] : no emotional problems [Eye Pain] : no eye pain [Red Eyes] : eyes not red [Eyesight Problems] : no eyesight problems [Discharge From Eyes] : no purulent discharge from the eyes [Dry Eyes] : no dryness of the eyes [Eyes Itch] : no itching of the eyes [Earache] : no earache [Loss Of Hearing] : no hearing loss [Nosebleeds] : no nosebleeds [Nasal Discharge] : no nasal discharge [Sore Throat] : no sore throat [Hoarseness] : no hoarseness [Heart Rate Is Slow] : the heart rate was not slow [Heart Rate Is Fast] : the heart rate was not fast [Chest Pain] : no chest pain [Palpitations] : no palpitations [Leg Claudication] : no intermittent leg claudication [Lower Ext Edema] : no lower extremity edema [Shortness Of Breath] : no shortness of breath [Wheezing] : no wheezing [Cough] : no cough [SOB on Exertion] : no shortness of breath during exertion [Abdominal Pain] : no abdominal pain [PND] : no PND [Vomiting] : no vomiting [Constipation] : no constipation [Diarrhea] : no diarrhea [Heartburn] : no heartburn [Melena] : no melena [Dysuria] : no dysuria [Incontinence] : no incontinence [Arthralgias] : no arthralgias [Joint Pain] : no joint pain [Joint Swelling] : no joint swelling [Joint Stiffness] : no joint stiffness [Limb Pain] : no limb pain [Limb Swelling] : no limb swelling [Skin Lesions] : no skin lesions [Itching] : no itching [Change In A Mole] : no change in a mole [Breast Pain] : no breast pain [Breast Lump] : no breast lump [Proptosis] : no proptosis [Hot Flashes] : no hot flashes [Muscle Weakness] : no muscle weakness [Deepening Of The Voice] : no deepening of the voice [Feelings Of Weakness] : no feelings of weakness [Easy Bleeding] : no tendency for easy bleeding [Easy Bruising] : no tendency for easy bruising [Swollen Glands] : no swollen glands [Swollen Glands In The Neck] : no swollen glands in the neck

## 2020-02-18 NOTE — DISCUSSION/SUMMARY
[FreeTextEntry1] : Reviewed MRI MRA and ENG reports;\par TIA and microvascular ischemic disease have to be distinguished from migraine with aura and temporal arteritis;reviewed ESR mildly raised (28) and  CRP (1.75 mg/dL N<0.4), Doppler carotids normal flow but one episode of irregular pulse noted; also because of the strong family history of cancer check paraneoplastic profile - NEGATIVE.\par By exclusion the most likely condition is either migraine aura or paraneoplastic disorder. While continuing to monitor for cancers, recommend a trial of preventive treatment of migraine using nortriptyline at bed time (which should also help insomnia), and vitamin B2 with other supplements - Magnesium and Co-Q10.\par Requested to maintain a symptom record which should be brought to follow up after 9 weeks.

## 2020-02-18 NOTE — HISTORY OF PRESENT ILLNESS
[FreeTextEntry1] : Admitted to [facility] for headache, and vertigo that lasted for two weeks in hospital and improved she had no evidence of stroke although she does have hypertension and few abnormal signals on the MRI. She had a similar episode lasting less that a day a year before in 2018.\par Headaches have ceased reportedly but she admits a light discomfort onher vertex; incoordination is better but still disabling. She does have a cane and a walker advised by PT but does not use them. Physical therapy has helped.

## 2020-03-09 ENCOUNTER — OUTPATIENT (OUTPATIENT)
Dept: OUTPATIENT SERVICES | Facility: HOSPITAL | Age: 85
LOS: 1 days | End: 2020-03-09
Payer: MEDICAID

## 2020-03-09 VITALS
DIASTOLIC BLOOD PRESSURE: 80 MMHG | OXYGEN SATURATION: 98 % | HEIGHT: 61 IN | SYSTOLIC BLOOD PRESSURE: 130 MMHG | WEIGHT: 141.98 LBS | RESPIRATION RATE: 16 BRPM | HEART RATE: 82 BPM | TEMPERATURE: 98 F

## 2020-03-09 DIAGNOSIS — K80.50 CALCULUS OF BILE DUCT WITHOUT CHOLANGITIS OR CHOLECYSTITIS WITHOUT OBSTRUCTION: ICD-10-CM

## 2020-03-09 DIAGNOSIS — Z96.659 PRESENCE OF UNSPECIFIED ARTIFICIAL KNEE JOINT: Chronic | ICD-10-CM

## 2020-03-09 DIAGNOSIS — Z90.49 ACQUIRED ABSENCE OF OTHER SPECIFIED PARTS OF DIGESTIVE TRACT: Chronic | ICD-10-CM

## 2020-03-09 DIAGNOSIS — Z98.890 OTHER SPECIFIED POSTPROCEDURAL STATES: Chronic | ICD-10-CM

## 2020-03-09 LAB
ALBUMIN SERPL ELPH-MCNC: 4.4 G/DL — SIGNIFICANT CHANGE UP (ref 3.3–5)
ALP SERPL-CCNC: 75 U/L — SIGNIFICANT CHANGE UP (ref 40–120)
ALT FLD-CCNC: 10 U/L — SIGNIFICANT CHANGE UP (ref 4–33)
ANION GAP SERPL CALC-SCNC: 15 MMO/L — HIGH (ref 7–14)
AST SERPL-CCNC: 17 U/L — SIGNIFICANT CHANGE UP (ref 4–32)
BASOPHILS # BLD AUTO: 0.03 K/UL — SIGNIFICANT CHANGE UP (ref 0–0.2)
BASOPHILS NFR BLD AUTO: 0.3 % — SIGNIFICANT CHANGE UP (ref 0–2)
BILIRUB DIRECT SERPL-MCNC: < 0.2 MG/DL — SIGNIFICANT CHANGE UP (ref 0.1–0.2)
BILIRUB SERPL-MCNC: 0.4 MG/DL — SIGNIFICANT CHANGE UP (ref 0.2–1.2)
BUN SERPL-MCNC: 18 MG/DL — SIGNIFICANT CHANGE UP (ref 7–23)
CALCIUM SERPL-MCNC: 10 MG/DL — SIGNIFICANT CHANGE UP (ref 8.4–10.5)
CHLORIDE SERPL-SCNC: 103 MMOL/L — SIGNIFICANT CHANGE UP (ref 98–107)
CO2 SERPL-SCNC: 23 MMOL/L — SIGNIFICANT CHANGE UP (ref 22–31)
CREAT SERPL-MCNC: 0.82 MG/DL — SIGNIFICANT CHANGE UP (ref 0.5–1.3)
EOSINOPHIL # BLD AUTO: 0.12 K/UL — SIGNIFICANT CHANGE UP (ref 0–0.5)
EOSINOPHIL NFR BLD AUTO: 1.4 % — SIGNIFICANT CHANGE UP (ref 0–6)
GLUCOSE SERPL-MCNC: 93 MG/DL — SIGNIFICANT CHANGE UP (ref 70–99)
HCT VFR BLD CALC: 43.1 % — SIGNIFICANT CHANGE UP (ref 34.5–45)
HGB BLD-MCNC: 13.2 G/DL — SIGNIFICANT CHANGE UP (ref 11.5–15.5)
IMM GRANULOCYTES NFR BLD AUTO: 0.5 % — SIGNIFICANT CHANGE UP (ref 0–1.5)
LYMPHOCYTES # BLD AUTO: 3.12 K/UL — SIGNIFICANT CHANGE UP (ref 1–3.3)
LYMPHOCYTES # BLD AUTO: 35.5 % — SIGNIFICANT CHANGE UP (ref 13–44)
MCHC RBC-ENTMCNC: 29.2 PG — SIGNIFICANT CHANGE UP (ref 27–34)
MCHC RBC-ENTMCNC: 30.6 % — LOW (ref 32–36)
MCV RBC AUTO: 95.4 FL — SIGNIFICANT CHANGE UP (ref 80–100)
MONOCYTES # BLD AUTO: 0.84 K/UL — SIGNIFICANT CHANGE UP (ref 0–0.9)
MONOCYTES NFR BLD AUTO: 9.6 % — SIGNIFICANT CHANGE UP (ref 2–14)
NEUTROPHILS # BLD AUTO: 4.63 K/UL — SIGNIFICANT CHANGE UP (ref 1.8–7.4)
NEUTROPHILS NFR BLD AUTO: 52.7 % — SIGNIFICANT CHANGE UP (ref 43–77)
NRBC # FLD: 0 K/UL — SIGNIFICANT CHANGE UP (ref 0–0)
PLATELET # BLD AUTO: 304 K/UL — SIGNIFICANT CHANGE UP (ref 150–400)
PMV BLD: 10.5 FL — SIGNIFICANT CHANGE UP (ref 7–13)
POTASSIUM SERPL-MCNC: 3.8 MMOL/L — SIGNIFICANT CHANGE UP (ref 3.5–5.3)
POTASSIUM SERPL-SCNC: 3.8 MMOL/L — SIGNIFICANT CHANGE UP (ref 3.5–5.3)
PROT SERPL-MCNC: 7.8 G/DL — SIGNIFICANT CHANGE UP (ref 6–8.3)
RBC # BLD: 4.52 M/UL — SIGNIFICANT CHANGE UP (ref 3.8–5.2)
RBC # FLD: 12.7 % — SIGNIFICANT CHANGE UP (ref 10.3–14.5)
SODIUM SERPL-SCNC: 141 MMOL/L — SIGNIFICANT CHANGE UP (ref 135–145)
WBC # BLD: 8.78 K/UL — SIGNIFICANT CHANGE UP (ref 3.8–10.5)
WBC # FLD AUTO: 8.78 K/UL — SIGNIFICANT CHANGE UP (ref 3.8–10.5)

## 2020-03-09 PROCEDURE — 93010 ELECTROCARDIOGRAM REPORT: CPT

## 2020-03-09 RX ORDER — MELOXICAM 15 MG/1
1 TABLET ORAL
Qty: 0 | Refills: 0 | DISCHARGE

## 2020-03-09 RX ORDER — ASPIRIN/CALCIUM CARB/MAGNESIUM 324 MG
1 TABLET ORAL
Qty: 0 | Refills: 0 | DISCHARGE

## 2020-03-09 RX ORDER — HYOSCYAMINE SULFATE 0.13 MG
1 TABLET ORAL
Qty: 0 | Refills: 0 | DISCHARGE

## 2020-03-09 RX ORDER — OMEGA-3 ACID ETHYL ESTERS 1 G
1 CAPSULE ORAL
Qty: 0 | Refills: 0 | DISCHARGE

## 2020-03-09 RX ORDER — MEMANTINE HYDROCHLORIDE AND DONEPEZIL HYDROCHLORIDE 21; 10 MG/1; MG/1
1 CAPSULE ORAL
Qty: 0 | Refills: 0 | DISCHARGE

## 2020-03-09 RX ORDER — ATORVASTATIN CALCIUM 80 MG/1
1 TABLET, FILM COATED ORAL
Qty: 0 | Refills: 0 | DISCHARGE

## 2020-03-09 NOTE — H&P PST ADULT - NSICDXPASTMEDICALHX_GEN_ALL_CORE_FT
PAST MEDICAL HISTORY:  Calculus of bile duct     Fatty liver     GERD (gastroesophageal reflux disease)     Glaucoma     H/O cerebellar ataxia     HLD (hyperlipidemia)     HTN (hypertension)     HTN (hypertension)     Migraine     Renal cyst     Vertigo h/o PAST MEDICAL HISTORY:  Calculus of bile duct     Fatty liver     GERD (gastroesophageal reflux disease)     Glaucoma     H/O cerebellar ataxia     HLD (hyperlipidemia)     HTN (hypertension)     Migraine     Renal cyst     Vertigo h/o

## 2020-03-09 NOTE — H&P PST ADULT - NSICDXPASTSURGICALHX_GEN_ALL_CORE_FT
PAST SURGICAL HISTORY:  H/O eye surgery     History of cholecystectomy     S/P knee replacement Right knee

## 2020-03-09 NOTE — H&P PST ADULT - NEGATIVE OPHTHALMOLOGIC SYMPTOMS
no diplopia/no photophobia/no loss of vision L/no blurred vision L/no pain R/no loss of vision R/no blurred vision R/no pain L

## 2020-03-09 NOTE — H&P PST ADULT - HISTORY OF PRESENT ILLNESS
84 year old female presents to presurgical testing with diagnosis of calculus of bile duct without cholangitis or cholecystitis without obstruction scheduled for ERCP with anesthesia. Pt was evaluated for abdominal pain in 12/2019.

## 2020-03-09 NOTE — H&P PST ADULT - ASSESSMENT
calculus of bile duct without cholangitis or cholecystitis without obstruction Problem: calculus of bile duct without cholangitis or cholecystitis without obstruction   Assessment and Plan: Pt is tentatively scheduled for ERCP with anesthesia for 3/19/20. Pre-op instructions provided. Pt given verbal and written instructions with teach back on chlorhexidine shampoo and pepcid. Pt verbalized understanding with return demonstration.     DORA precautions, OR booking notified  OR booking notified of Right knee replacement    Problem: Migraine, cerebellar ataxia  Assessment and Plan: Last neuro note in chart.    Problem: HTN  Assessment and Plan: Pt instructed to take losartan with a sip of water on the morning of procedure. Last echo and stress test from 5/2019 in chart.

## 2020-03-09 NOTE — H&P PST ADULT - LANGUAGE ASSISTANCE NEEDED
No-Patient/Caregiver offered and refused free interpretation services. Haroldo #[id number]/No-Patient/Caregiver offered and refused free interpretation services.

## 2020-03-09 NOTE — H&P PST ADULT - NEGATIVE CARDIOVASCULAR SYMPTOMS
no palpitations/no paroxysmal nocturnal dyspnea/no chest pain/no dyspnea on exertion/no peripheral edema

## 2020-03-09 NOTE — H&P PST ADULT - RS GEN PE MLT RESP DETAILS PC
good air movement/airway patent/no rales/respirations non-labored/no wheezes/breath sounds equal/clear to auscultation bilaterally/no rhonchi

## 2020-03-19 ENCOUNTER — APPOINTMENT (OUTPATIENT)
Dept: GASTROENTEROLOGY | Facility: HOSPITAL | Age: 85
End: 2020-03-19

## 2020-03-20 LAB
CRP SERPL-MCNC: 1.75 MG/DL
ERYTHROCYTE [SEDIMENTATION RATE] IN BLOOD BY WESTERGREN METHOD: 28 MM/HR

## 2020-05-05 ENCOUNTER — APPOINTMENT (OUTPATIENT)
Dept: NEUROLOGY | Facility: CLINIC | Age: 85
End: 2020-05-05

## 2020-05-05 RX ORDER — NORTRIPTYLINE HYDROCHLORIDE 10 MG/1
10 CAPSULE ORAL
Qty: 180 | Refills: 3 | Status: ACTIVE | COMMUNITY
Start: 2020-02-18 | End: 1900-01-01

## 2020-09-16 PROBLEM — Z86.69 PERSONAL HISTORY OF OTHER DISEASES OF THE NERVOUS SYSTEM AND SENSE ORGANS: Chronic | Status: ACTIVE | Noted: 2020-03-09

## 2020-09-16 PROBLEM — K76.0 FATTY (CHANGE OF) LIVER, NOT ELSEWHERE CLASSIFIED: Chronic | Status: ACTIVE | Noted: 2020-03-09

## 2020-09-16 PROBLEM — R42 DIZZINESS AND GIDDINESS: Chronic | Status: ACTIVE | Noted: 2020-03-09

## 2020-09-16 PROBLEM — K80.50 CALCULUS OF BILE DUCT WITHOUT CHOLANGITIS OR CHOLECYSTITIS WITHOUT OBSTRUCTION: Chronic | Status: ACTIVE | Noted: 2020-03-09

## 2020-09-16 PROBLEM — G43.909 MIGRAINE, UNSPECIFIED, NOT INTRACTABLE, WITHOUT STATUS MIGRAINOSUS: Chronic | Status: ACTIVE | Noted: 2020-03-09

## 2020-09-16 PROBLEM — K21.9 GASTRO-ESOPHAGEAL REFLUX DISEASE WITHOUT ESOPHAGITIS: Chronic | Status: ACTIVE | Noted: 2020-03-09

## 2020-09-16 PROBLEM — N28.1 CYST OF KIDNEY, ACQUIRED: Chronic | Status: ACTIVE | Noted: 2020-03-09

## 2020-10-09 ENCOUNTER — APPOINTMENT (OUTPATIENT)
Dept: GASTROENTEROLOGY | Facility: CLINIC | Age: 85
End: 2020-10-09
Payer: MEDICAID

## 2020-10-09 ENCOUNTER — LABORATORY RESULT (OUTPATIENT)
Age: 85
End: 2020-10-09

## 2020-10-09 VITALS
DIASTOLIC BLOOD PRESSURE: 81 MMHG | OXYGEN SATURATION: 100 % | BODY MASS INDEX: 28.27 KG/M2 | HEIGHT: 60 IN | SYSTOLIC BLOOD PRESSURE: 167 MMHG | TEMPERATURE: 97.1 F | HEART RATE: 101 BPM | WEIGHT: 144 LBS

## 2020-10-09 DIAGNOSIS — Z20.828 CONTACT WITH AND (SUSPECTED) EXPOSURE TO OTHER VIRAL COMMUNICABLE DISEASES: ICD-10-CM

## 2020-10-09 PROCEDURE — 99214 OFFICE O/P EST MOD 30 MIN: CPT

## 2020-10-09 NOTE — HISTORY OF PRESENT ILLNESS
[None] : had no significant interval events [Heartburn] : denies heartburn [Nausea] : denies nausea [Vomiting] : denies vomiting [Diarrhea] : denies diarrhea [Yellow Skin Or Eyes (Jaundice)] : denies jaundice [Abdominal Pain] : denies abdominal pain [Rectal Pain] : denies rectal pain [Constipation] : constipation [Abdominal Swelling] : abdominal swelling [Cholelithiasis] : cholelithiasis [Wt Gain ___ Lbs] : no recent weight gain [Wt Loss ___ Lbs] : no recent weight loss [GERD] : no gastroesophageal reflux disease [Hiatus Hernia] : no hiatus hernia [Peptic Ulcer Disease] : no peptic ulcer disease [Pancreatitis] : no pancreatitis [Kidney Stone] : no kidney stone [Inflammatory Bowel Disease] : no inflammatory bowel disease [Irritable Bowel Syndrome] : no irritable bowel syndrome [Diverticulitis] : no diverticulitis [Alcohol Abuse] : no alcohol abuse [Malignancy] : no malignancy [Abdominal Surgery] : no abdominal surgery [Appendectomy] : no appendectomy [Cholecystectomy] : no cholecystectomy [de-identified] : The patient states that she is feeling uncomfortable. The patient denies any jaundice or pruritus.  The patient denies any chronic lower back pain. The patient denies any abdominal pain.  The patient complains of abdominal gas and bloating.  The patient denies any nausea or vomiting.  The patient denies any gastroesophageal reflux disease or dysphagia.  The patient denies any atypical chest pain, shortness of breath or palpitations.  The patient denies any diaphoresis. The patient complains of constipation but denies any diarrhea.  The patient has 1 bowel movement every 1 to 2 days.  The patient complains of a change in bowel habits.  The patient complains of a change in caliber of stool.   The patient denies having mucus discharge with the bowel movements.  The patient denies any bright red blood per rectum, melena or hematemesis.  The patient denies any rectal pain or rectal pruritus.  The patient denies any weight loss or anorexia.  She denies any fevers or chills.  The patient was recently evaluated at the [facility] [place] emergency room on December 9, 2019 for back pain. The patient had blood work and imaging studies to assess the symptoms. The blood work performed was remarkable for an elevated BUN of 21 mg/dl and normal liver enzymes. The CAT scan of the abdomen and pelvis with IV contrast performed on December 9, 2019 revealed a 3mm distal CBD calcific density possibly a ductal calculus. Also noted was colonic diverticulosis without CT evidence of acute diverticulitis and no urolithiasis or hydronephrosis. The patient was observed with resolution of the symptoms and was discharged to followup in the office. I reviewed the blood work and imaging studies performed in the emergency room. The MRCP performed on December 30, 2019 revealed s/p cholecystectomy with stable 9 mm dilated CBD. There are now 2 small distal CBD calculi measuring 4 mm. Also noted was a stable 1 cm maximal longitudinal dimension medial pancreatic head cyst. Also noted was hepatic steatosis. 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 The patient admits to having a prior upper endoscopy and colonoscopy performed by another gastroenterologist. According to the patient, the upper endoscopic findings revealed gastritis. The colonoscopic findings were unknown to the patient. The patient admits to a family history of GI problems. The patient’s mother had a history of pancreatic cancer at age 65.  [de-identified] : (-) smoking, (-) ETOH, (-) IVDA\par

## 2020-10-09 NOTE — ASSESSMENT
[FreeTextEntry1] : Dyspepsia: The patient complains of dyspeptic symptoms.  The patient was advised to abide by an anti-gas diet.  The patient was given a pamphlet for anti-gas.  The patient and I reviewed the anti-gas diet at length. The patient is to start on a trial of Phazyme one tablet 3 times a day p.r.n. abdominal pain and gas.\par Constipation: The patient complains of constipation. I recommend a high-fiber diet. I recommend a trial of a probiotic such as Align once a day. I recommend a trial of Metamucil once a day for fiber supplementation. I recommend a trial of Miralax 1 packet once a day for the constipation. If the symptoms persist, the patient may require a colonoscopy to assess the symptoms.  The patient was told of the risks and benefits of the procedure.  The patient was told of the risks of perforation, emergency surgery, bleeding, infections and missed lesions.  The patient agreed and will followup to reassess the symptoms.  \par Abnormal Imaging Studies: The CAT scan of the abdomen and pelvis with IV contrast performed on December 9, 2019 revealed a 3mm distal CBD calcific density possibly a ductal calculus. Also noted was colonic diverticulosis without CT evidence of acute diverticulitis and no urolithiasis or hydronephrosis. The MRCP performed on December 30, 2019 revealed s/p cholecystectomy with stable 9 mm dilated CBD. There are now 2 small distal CBD calculi measuring 4 mm. Also noted was a stable 1 cm maximal longitudinal dimension medial pancreatic head cyst. Also noted was hepatic steatosis. 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 \par Choledocholithiasis: The patient was found to have an abnormal study suggestive of choledocholithiasis. I recommend to an ERCP with possible therapeutics to assess for choledocholithiasis. The patient was told of the risks and benefits of the procedure. The patient was told of the risks of perforation, emergency surgery, bleeding, infection, missed lesions and pancreatitis. The patient agreed and will follow up for the procedure. The patient is to follow-up with Dr. Teresa Jefferson at [facility] in [facility] for the ERCP. The procedure was not performed because of the COVID 19 pandemic.  The patient was advised to follow up in my office after evaluation for the ERCP.\par Fatty Liver: The patient had an imaging study suggestive of fatty infiltration of the liver. The patient denies any jaundice or pruritus. The patient denies any alcohol use. The patient denies taking large doses of nonsteroidal anti-inflammatory drugs or acetaminophen. The findings are suggestive of fatty liver. The patient and I had a long discussion regarding the risks of fatty liver and possible progression to cirrhosis. The patient was told of the possible increased risk of developing liver failure, cirrhosis, ascites, GI bleeding secondary to varices, hepatic encephalopathy, bleeding tendencies and liver cancer. The patient was told of the importance of follow-up. The patient was advised to follow up every 6 months for blood work and imaging studies. The patient agreed and will follow up. The patient was advised to lose weight. I recommend a trial of vitamin E supplementation for the fatty liver. If the liver enzymes remain elevated, the patient may require a trial of Pioglitazone for the fatty liver. I recommend avoid alcohol and hepato-toxic agents. The patient was also advised to avoid NSAIDs, Acetaminophen and any other hepatotoxic drugs. The patient was also advised not to share needles, razors, scissors, nail clippers, etc.. The patient is to continue close follow-up in our office for blood work and exams. If the liver enzymes remain elevated, the patient may require a CT guided liver biopsy to assess the liver parenchyma for possible treatment. We had a long discussion regarding the risks and benefits of the procedure. The patient was told of the risks of bleeding, perforation, infections, emergency surgery and missing lesions. The patient agreed and will follow-up to reassess the symptoms. \par Pancreatic Cystic Lesion: The patient was recently diagnosed with a pancreatic cystic lesion on recent MRCP. The patient complained of prior back pain. She denies any jaundice, pruritus. The patient currently denies any abdominal pain. The patient denies any prior history of EtOH abuse. I recommend a repeat MRI of the pancreas in one year to reassess the pancreatic cystic lesions. The patient and I had a long discussion regarding the potential risks of pancreatic cysts progressing to pancreatic cancer. If the MRI reveals a change in the pancreatic cystic lesion, the patient may require an endoscopic ultrasound of pancreas with possible fine-needle aspiration to better assess the pancreatic cystic lesions. The patient is aware of the importance for followup. The patient agrees and will followup.\par Prior Endoscopic Procedures: The patient had a prior upper endoscopy and colonoscopy performed by another gastroenterologist. I will try to obtain the prior upper endoscopy and colonoscopy reports. The patient is to sign a record release to obtain those records.\par Blood Work: I recommend blood work to assess the liver. I recommend a CBC, SMA 24, amylase, lipase, ESR, TFTs, ADRY, rheumatoid factor, celiac sprue panel, IgA, profile for hepatitis A, B, C. ,AFP, alpha 1 anti-trypsin  antibody, ceruloplasmin level, iron, TIBC, ferritin level, AMA, anti smooth muscle antibody and PT/INR/PTT.  I also recommend obtaining the recent blood work performed by the patient's PMD.\par Follow-up: The patient is to follow-up in the office in 4 weeks to reassess the symptoms. The patient was told to call the office if any further problems. \par \par

## 2020-10-13 LAB
A1AT SERPL-MCNC: 114 MG/DL
AFP-TM SERPL-MCNC: 3.2 NG/ML
ALBUMIN SERPL ELPH-MCNC: 5 G/DL
ALP BLD-CCNC: 102 U/L
ALT SERPL-CCNC: 19 U/L
AMYLASE/CREAT SERPL: 99 U/L
ANA SER IF-ACNC: NEGATIVE
ANION GAP SERPL CALC-SCNC: 16 MMOL/L
APTT BLD: 30.1 SEC
AST SERPL-CCNC: 23 U/L
BASOPHILS # BLD AUTO: 0.04 K/UL
BASOPHILS NFR BLD AUTO: 0.5 %
BILIRUB SERPL-MCNC: 0.6 MG/DL
BUN SERPL-MCNC: 20 MG/DL
CALCIUM SERPL-MCNC: 10.4 MG/DL
CANCER AG19-9 SERPL-ACNC: 42 U/ML
CEA SERPL-MCNC: 3.6 NG/ML
CERULOPLASMIN SERPL-MCNC: 29 MG/DL
CHLORIDE SERPL-SCNC: 104 MMOL/L
CHOLEST SERPL-MCNC: 218 MG/DL
CHOLEST/HDLC SERPL: 2.6 RATIO
CO2 SERPL-SCNC: 24 MMOL/L
CREAT SERPL-MCNC: 0.94 MG/DL
EOSINOPHIL # BLD AUTO: 0.17 K/UL
EOSINOPHIL NFR BLD AUTO: 1.9 %
ERYTHROCYTE [SEDIMENTATION RATE] IN BLOOD BY WESTERGREN METHOD: 26 MM/HR
FERRITIN SERPL-MCNC: 173 NG/ML
GGT SERPL-CCNC: 14 U/L
GLIADIN IGA SER QL: <5 UNITS
GLIADIN IGG SER QL: <5 UNITS
GLIADIN PEPTIDE IGA SER-ACNC: NEGATIVE
GLIADIN PEPTIDE IGG SER-ACNC: NEGATIVE
GLUCOSE SERPL-MCNC: 118 MG/DL
HBV CORE IGG+IGM SER QL: NONREACTIVE
HBV CORE IGM SER QL: NONREACTIVE
HBV E AB SER QL: NEGATIVE
HBV E AG SER QL: NEGATIVE
HBV SURFACE AB SER QL: NONREACTIVE
HBV SURFACE AG SER QL: NONREACTIVE
HCT VFR BLD CALC: 44.5 %
HCV AB SER QL: NONREACTIVE
HCV S/CO RATIO: 0.07 S/CO
HDLC SERPL-MCNC: 83 MG/DL
HEPATITIS A IGG ANTIBODY: REACTIVE
HGB BLD-MCNC: 13.9 G/DL
IGA SER QL IEP: 205 MG/DL
IMM GRANULOCYTES NFR BLD AUTO: 0.6 %
INR PPP: 0.94 RATIO
IRON SATN MFR SERPL: 29 %
IRON SERPL-MCNC: 97 UG/DL
LDLC SERPL CALC-MCNC: 108 MG/DL
LPL SERPL-CCNC: 46 U/L
LYMPHOCYTES # BLD AUTO: 3.41 K/UL
LYMPHOCYTES NFR BLD AUTO: 39 %
MAN DIFF?: NORMAL
MCHC RBC-ENTMCNC: 30.5 PG
MCHC RBC-ENTMCNC: 31.2 GM/DL
MCV RBC AUTO: 97.6 FL
MITOCHONDRIA AB SER IF-ACNC: NORMAL
MONOCYTES # BLD AUTO: 0.68 K/UL
MONOCYTES NFR BLD AUTO: 7.8 %
NEUTROPHILS # BLD AUTO: 4.39 K/UL
NEUTROPHILS NFR BLD AUTO: 50.2 %
PLATELET # BLD AUTO: 346 K/UL
POTASSIUM SERPL-SCNC: 4 MMOL/L
PROT SERPL-MCNC: 7.9 G/DL
PT BLD: 11.1 SEC
RBC # BLD: 4.56 M/UL
RBC # FLD: 12.8 %
RHEUMATOID FACT SER QL: 14 IU/ML
SARS-COV-2 IGG SERPL IA-ACNC: 0.09 INDEX
SARS-COV-2 IGG SERPL QL IA: NEGATIVE
SMOOTH MUSCLE AB SER QL IF: NORMAL
SODIUM SERPL-SCNC: 143 MMOL/L
TIBC SERPL-MCNC: 335 UG/DL
TRIGL SERPL-MCNC: 135 MG/DL
TSH SERPL-ACNC: 2.1 UIU/ML
TTG IGA SER IA-ACNC: <1.2 U/ML
TTG IGA SER-ACNC: NEGATIVE
TTG IGG SER IA-ACNC: 1.4 U/ML
TTG IGG SER IA-ACNC: NEGATIVE
UIBC SERPL-MCNC: 238 UG/DL
WBC # FLD AUTO: 8.74 K/UL

## 2020-11-06 ENCOUNTER — APPOINTMENT (OUTPATIENT)
Dept: GASTROENTEROLOGY | Facility: CLINIC | Age: 85
End: 2020-11-06
Payer: MEDICAID

## 2020-11-06 VITALS
DIASTOLIC BLOOD PRESSURE: 79 MMHG | WEIGHT: 143 LBS | SYSTOLIC BLOOD PRESSURE: 152 MMHG | BODY MASS INDEX: 28.07 KG/M2 | HEIGHT: 60 IN | HEART RATE: 81 BPM | OXYGEN SATURATION: 99 % | TEMPERATURE: 97.6 F

## 2020-11-06 PROCEDURE — 99213 OFFICE O/P EST LOW 20 MIN: CPT

## 2020-11-06 PROCEDURE — 99072 ADDL SUPL MATRL&STAF TM PHE: CPT

## 2020-11-06 RX ORDER — SIMETHICONE 180 MG
180 CAPSULE ORAL 4 TIMES DAILY
Qty: 120 | Refills: 3 | Status: ACTIVE | COMMUNITY
Start: 2020-11-06 | End: 1900-01-01

## 2020-11-06 NOTE — ASSESSMENT
[FreeTextEntry1] : Dyspepsia: The patient complains of dyspeptic symptoms.  The patient was advised to abide by an anti-gas (low FOD-MAP) diet.  The patient was given a pamphlet for anti-gas.  The patient and I reviewed the anti-gas diet at length. The patient is to start on a trial of Phazyme one tablet 3 times a day p.r.n. abdominal pain and gas.\par Abnormal Imaging Studies: The CAT scan of the abdomen and pelvis with IV contrast performed on December 9, 2019 revealed a 3 mm distal CBD calcific density possibly a ductal calculus. Also noted was colonic diverticulosis without CT evidence of acute diverticulitis and no urolithiasis or hydronephrosis. The MRCP performed on December 30, 2019 revealed s/p cholecystectomy with stable 9 mm dilated CBD. There are now 2 small distal CBD calculi measuring 4 mm. Also noted was a stable 1 cm maximal longitudinal dimension medial pancreatic head cyst. Also noted was hepatic steatosis. 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 \par Choledocholithiasis: The patient was found to have an abnormal study suggestive of choledocholithiasis. I recommend to an EUS/ ERCP with possible therapeutics to assess for choledocholithiasis. The patient was told of the risks and benefits of the procedure. The patient was told of the risks of perforation, emergency surgery, bleeding, infection, missed lesions and pancreatitis. The patient agreed and will follow up for the procedure. The patient is to follow-up with Dr. Teresa Jefferson at [facility] in [facility] for the ERCP. The procedure was not performed because of the COVID 19 pandemic. The patient was advised to follow up in my office after evaluation for the EUS/ERCP.\par Fatty Liver: The patient had an imaging study suggestive of fatty infiltration of the liver. The patient denies any jaundice or pruritus. The patient denies any alcohol use. The patient denies taking large doses of nonsteroidal anti-inflammatory drugs or acetaminophen. The findings are suggestive of fatty liver. The patient and I had a long discussion regarding the risks of fatty liver and possible progression to cirrhosis. The patient was told of the possible increased risk of developing liver failure, cirrhosis, ascites, GI bleeding secondary to varices, hepatic encephalopathy, bleeding tendencies and liver cancer. The patient was told of the importance of follow-up. The patient was advised to follow up every 6 months for blood work and imaging studies. The patient agreed and will follow up. The patient was advised to lose weight. I recommend a trial of vitamin E supplementation for the fatty liver. If the liver enzymes remain elevated, the patient may require a trial of Pioglitazone for the fatty liver. I recommend avoid alcohol and hepato-toxic agents. The patient was also advised to avoid NSAIDs, Acetaminophen and any other hepatotoxic drugs. The patient was also advised not to share needles, razors, scissors, nail clippers, etc.. The patient is to continue close follow-up in our office for blood work and exams. If the liver enzymes remain elevated, the patient may require a CT guided liver biopsy to assess the liver parenchyma for possible treatment. We had a long discussion regarding the risks and benefits of the procedure. The patient was told of the risks of bleeding, perforation, infections, emergency surgery and missing lesions. The patient agreed and will follow-up to reassess the symptoms. \par Pancreatic Cystic Lesion: The patient was recently diagnosed with a pancreatic cystic lesion on recent MRCP. The patient complained of prior back pain. She denies any jaundice, pruritus. The patient currently denies any abdominal pain. The patient denies any prior history of EtOH abuse. I recommend a repeat MRI of the pancreas in one year to reassess the pancreatic cystic lesions. The patient and I had a long discussion regarding the potential risks of pancreatic cysts progressing to pancreatic cancer. If the MRI reveals a change in the pancreatic cystic lesion, the patient may require an endoscopic ultrasound of pancreas with possible fine-needle aspiration to better assess the pancreatic cystic lesions. The patient is aware of the importance for followup. The patient agrees and will followup.\par Prior Endoscopic Procedures: The patient had a prior upper endoscopy and colonoscopy performed by another gastroenterologist. I will try to obtain the prior upper endoscopy and colonoscopy reports. The patient is to sign a record release to obtain those records.\par Follow-up: The patient is to follow-up in the office in 4 weeks to reassess the symptoms. The patient was told to call the office if any further problems. \par \par

## 2020-11-06 NOTE — HISTORY OF PRESENT ILLNESS
[None] : had no significant interval events [Heartburn] : denies heartburn [Nausea] : denies nausea [Vomiting] : denies vomiting [Diarrhea] : denies diarrhea [Constipation] : denies constipation [Yellow Skin Or Eyes (Jaundice)] : denies jaundice [Abdominal Pain] : denies abdominal pain [Rectal Pain] : denies rectal pain [Abdominal Swelling] : abdominal swelling [Cholelithiasis] : cholelithiasis [Cholecystectomy] : cholecystectomy [Wt Gain ___ Lbs] : no recent weight gain [Wt Loss ___ Lbs] : no recent weight loss [GERD] : no gastroesophageal reflux disease [Hiatus Hernia] : no hiatus hernia [Peptic Ulcer Disease] : no peptic ulcer disease [Pancreatitis] : no pancreatitis [Kidney Stone] : no kidney stone [Inflammatory Bowel Disease] : no inflammatory bowel disease [Irritable Bowel Syndrome] : no irritable bowel syndrome [Diverticulitis] : no diverticulitis [Alcohol Abuse] : no alcohol abuse [de-identified] : The patient states that she is feeling better. The patient denies any jaundice or pruritus.  The patient denies any chronic lower back pain. The patient denies any abdominal pain.  The patient complains of abdominal gas and bloating.  The patient denies any nausea or vomiting.  The patient denies any gastroesophageal reflux disease or dysphagia.  The patient denies any atypical chest pain, shortness of breath or palpitations.  The patient denies any diaphoresis. The patient denies any constipation or diarrhea.  The patient has 1 bowel movement every 1 to 2 days.  The patient denies a change in bowel habits.  The patient denies a change in caliber of stool.  The patient denies having mucus discharge with the bowel movements.  The patient denies any bright red blood per rectum, melena or hematemesis.  The patient denies any rectal pain or rectal pruritus.  The patient denies any weight loss or anorexia.  She denies any fevers or chills.  The blood work performed on October 9, 2020 revealed an elevated CA 19-9 of 42 U/ml, and elevated blood glucose of 118 mg/dL, an elevated ferritin level of 173 ng/mL, an elevated cholesterol level of 218 mg/dL, an elevated rheumatoid factor of 14 IU/ml, and elevated ESR of 26 mm/hr and reactive hepatitis A IgG antibody. The COVID 19 IgG antibody was negative with a 0.09 index.  The patient was recently evaluated at the [facility] [place] emergency room on December 9, 2019 for back pain. The patient had blood work and imaging studies to assess the symptoms. The blood work performed was remarkable for an elevated BUN of 21 mg/dl and normal liver enzymes. The CAT scan of the abdomen and pelvis with IV contrast performed on December 9, 2019 revealed a 3mm distal CBD calcific density possibly a ductal calculus. Also noted was colonic diverticulosis without CT evidence of acute diverticulitis and no urolithiasis or hydronephrosis. The patient was observed with resolution of the symptoms and was discharged to followup in the office. I reviewed the blood work and imaging studies performed in the emergency room. The MRCP performed on December 30, 2019 revealed s/p cholecystectomy with stable 9 mm dilated CBD. There are now 2 small distal CBD calculi measuring 4 mm. Also noted was a stable 1 cm maximal longitudinal dimension medial pancreatic head cyst. Also noted was hepatic steatosis. 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 The patient admits to having a prior upper endoscopy and colonoscopy performed by another gastroenterologist. According to the patient, the upper endoscopic findings revealed gastritis. The colonoscopic findings were unknown to the patient. The patient admits to a family history of GI problems. The patient’s mother had a history of pancreatic cancer at age 65.  [de-identified] : (-) smoking, (-) ETOH, (-) IVDA\par

## 2020-11-11 ENCOUNTER — NON-APPOINTMENT (OUTPATIENT)
Age: 85
End: 2020-11-11

## 2020-11-12 ENCOUNTER — NON-APPOINTMENT (OUTPATIENT)
Age: 85
End: 2020-11-12

## 2020-11-14 ENCOUNTER — APPOINTMENT (OUTPATIENT)
Dept: DISASTER EMERGENCY | Facility: CLINIC | Age: 85
End: 2020-11-14

## 2020-11-17 ENCOUNTER — APPOINTMENT (OUTPATIENT)
Dept: GASTROENTEROLOGY | Facility: HOSPITAL | Age: 85
End: 2020-11-17

## 2020-12-10 ENCOUNTER — OUTPATIENT (OUTPATIENT)
Dept: OUTPATIENT SERVICES | Facility: HOSPITAL | Age: 85
LOS: 1 days | End: 2020-12-10

## 2020-12-10 ENCOUNTER — APPOINTMENT (OUTPATIENT)
Dept: GASTROENTEROLOGY | Facility: CLINIC | Age: 85
End: 2020-12-10
Payer: MEDICAID

## 2020-12-10 VITALS
BODY MASS INDEX: 28.47 KG/M2 | HEIGHT: 60 IN | OXYGEN SATURATION: 99 % | DIASTOLIC BLOOD PRESSURE: 80 MMHG | SYSTOLIC BLOOD PRESSURE: 140 MMHG | WEIGHT: 145 LBS | HEART RATE: 85 BPM

## 2020-12-10 VITALS — TEMPERATURE: 98.2 F

## 2020-12-10 DIAGNOSIS — R10.13 EPIGASTRIC PAIN: ICD-10-CM

## 2020-12-10 DIAGNOSIS — Z96.659 PRESENCE OF UNSPECIFIED ARTIFICIAL KNEE JOINT: Chronic | ICD-10-CM

## 2020-12-10 DIAGNOSIS — Z98.890 OTHER SPECIFIED POSTPROCEDURAL STATES: Chronic | ICD-10-CM

## 2020-12-10 DIAGNOSIS — K59.00 CONSTIPATION, UNSPECIFIED: ICD-10-CM

## 2020-12-10 DIAGNOSIS — K80.50 CALCULUS OF BILE DUCT WITHOUT CHOLANGITIS OR CHOLECYSTITIS WITHOUT OBSTRUCTION: ICD-10-CM

## 2020-12-10 DIAGNOSIS — Z90.49 ACQUIRED ABSENCE OF OTHER SPECIFIED PARTS OF DIGESTIVE TRACT: Chronic | ICD-10-CM

## 2020-12-10 PROCEDURE — 99215 OFFICE O/P EST HI 40 MIN: CPT | Mod: GC

## 2020-12-10 RX ORDER — LORATADINE 5 MG
17 TABLET,CHEWABLE ORAL
Qty: 1 | Refills: 0 | Status: ACTIVE | COMMUNITY
Start: 2020-12-10 | End: 1900-01-01

## 2020-12-10 NOTE — ASSESSMENT
[FreeTextEntry1] : 86yo F with PMH of dyspepsia, cholecystectomy, HTN who presents for ERCP/EUS for choledocholithiasis. \par \par # Bloating - worse when constipated, counseled on low FODMAP diet. Added Miralax for constipation.\par # Choledocholithasis/pancreatic cyst - will schedule for EUS/ERCP as previously recommended by Dr. Romo. F/u with Dr. Romo.\par \par Recommendations:\par - ERCP/EUS\par - Miralax PRN\par - f/u with Dr. Romo\par \par Discussed with Dr. Eaton.\par \par Jasvir Kurtz\par PGY-4

## 2020-12-10 NOTE — HISTORY OF PRESENT ILLNESS
[de-identified] : Ms. Eason is an 86yo F with PMH of dyspepsia, cholecystectomy, HTN who presents for ERCP/EUS.\par \par #[id number]\par \par Patient was previously followed by Dr. Romo, now establishing care in our GI clinic due to insurance issues. Patient has several ongoing GI problems:\par 1. Dyspepsia: Patient was recommended low FODMAP diet for abdominal distention and bloating, however did not try it yet. Reports constipation, on fibers but still feels like she has to strain. Her bloating is better when she is not constipated.\par 2. Abnormal Imaging Studies: CT A&P 9/19 revealed a 3 mm distal CBD calcific density possibly a ductal calculus. MRCP 12/19 revealed s/p cholecystectomy with stable 9 mm dilated CBD. There are now 2 small distal CBD calculi measuring 4 mm. Also noted was a stable 1 cm maximal longitudinal dimension medial pancreatic head cyst. She was recommended an EUS/ERCP for choledocholithiasis - which was not performed yet. Was told that she cannot get the ERCP done due to insurance issues. She use to have intermittent epigastric pain, but no pain for the past year.\par 3. Fatty Liver: Hepatic steatosis seen on imaging. Normal liver tests 10/20. Negative hepatitis panel, no EtOH use. No family history of liver disease.\par 4. Pancreatic Cyst: As seen on MRCP 12/19. No epigastric pain, no jaundice, pruritus. Reports normal stool color and consistency.\par 5. Patient reports normal colonoscopy and endoscopy with gastritis (2016). No report on file.

## 2020-12-10 NOTE — PHYSICAL EXAM
[General Appearance - Alert] : alert [General Appearance - In No Acute Distress] : in no acute distress [Neck Appearance] : the appearance of the neck was normal [Heart Rate And Rhythm] : heart rate was normal and rhythm regular [Bowel Sounds] : normal bowel sounds [Abdomen Soft] : soft [Abdomen Tenderness] : non-tender [Abdomen Mass (___ Cm)] : no abdominal mass palpated [Deep Tendon Reflexes (DTR)] : deep tendon reflexes were 2+ and symmetric [Sensation] : the sensory exam was normal to light touch and pinprick [No Focal Deficits] : no focal deficits [Oriented To Time, Place, And Person] : oriented to person, place, and time [Impaired Insight] : insight and judgment were intact [Affect] : the affect was normal [Involuntary Movements] : no involuntary movements were seen [Skin Color & Pigmentation] : normal skin color and pigmentation [Skin Turgor] : normal skin turgor [] : no rash

## 2020-12-10 NOTE — END OF VISIT
[] : Fellow [Time Spent: ___ minutes] : I have spent [unfilled] minutes of time on the encounter. [>50% of the face to face encounter time was spent on counseling and/or coordination of care for ___] : Greater than 50% of the face to face encounter time was spent on counseling and/or coordination of care for [unfilled] [FreeTextEntry3] : Patient follows with Dr. Romo, referred to Dr. Dennys Jefferson for EUS/ERCP for choledocholithiasis; given her insurance, needs appointment first in GI clinic prior to procedures. \par Additionally, patient with complaints of bloating and abdominal discomfort. Some improvement after BM, which she notes are often daily, but sometimes hard and sometimes only a "smear" on paper. Bowel habits are better with fiber, but still has intermittent dark stools. Will trial Miralax. May also need to consider escalation of Miralax and discontinuation of fiber as fiber may be worsening patient's bloating. At this time, however, will defer further management of general GI complaints to patient's primary GI, Dr. Romo.

## 2021-01-08 ENCOUNTER — APPOINTMENT (OUTPATIENT)
Dept: GASTROENTEROLOGY | Facility: CLINIC | Age: 86
End: 2021-01-08
Payer: MEDICAID

## 2021-01-08 VITALS
OXYGEN SATURATION: 97 % | SYSTOLIC BLOOD PRESSURE: 132 MMHG | TEMPERATURE: 97.9 F | HEART RATE: 84 BPM | DIASTOLIC BLOOD PRESSURE: 73 MMHG | WEIGHT: 144 LBS | BODY MASS INDEX: 28.27 KG/M2 | HEIGHT: 60 IN

## 2021-01-08 PROCEDURE — 99072 ADDL SUPL MATRL&STAF TM PHE: CPT

## 2021-01-08 PROCEDURE — 99213 OFFICE O/P EST LOW 20 MIN: CPT

## 2021-01-08 NOTE — ASSESSMENT
[FreeTextEntry1] : Dyspepsia: The patient complains of dyspeptic symptoms.  The patient was advised to abide by an anti-gas diet.  The patient was given a pamphlet for anti-gas.  The patient and I reviewed the anti-gas diet at length. The patient is to start on a trial of Phazyme one tablet 3 times a day p.r.n. abdominal pain and gas.\par Abnormal Imaging Studies: The CAT scan of the abdomen and pelvis with IV contrast performed on December 9, 2019 revealed a 3 mm distal CBD calcific density possibly a ductal calculus. Also noted was colonic diverticulosis without CT evidence of acute diverticulitis and no urolithiasis or hydronephrosis. The MRCP performed on December 30, 2019 revealed s/p cholecystectomy with stable 9 mm dilated CBD. There are now 2 small distal CBD calculi measuring 4 mm. Also noted was a stable 1 cm maximal longitudinal dimension medial pancreatic head cyst. Also noted was hepatic steatosis. 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 \par Choledocholithiasis: The patient was found to have an abnormal study suggestive of choledocholithiasis. I recommend to an EUS/ ERCP with possible therapeutics to assess for choledocholithiasis. The patient was told of the risks and benefits of the procedure. The patient was told of the risks of perforation, emergency surgery, bleeding, infection, missed lesions and pancreatitis. The patient agreed and will follow up for the procedure. The patient is to follow-up with Dr. Teresa Jefferson at [facility] in [facility] for the endoscopic ultrasound and ERCP on January 21, 2021. The patient was advised to follow up in my office after evaluation for the EUS/ERCP.\par Fatty Liver: The patient had an imaging study suggestive of fatty infiltration of the liver. The patient denies any jaundice or pruritus. The patient denies any alcohol use. The patient denies taking large doses of nonsteroidal anti-inflammatory drugs or acetaminophen. The findings are suggestive of fatty liver. The patient and I had a long discussion regarding the risks of fatty liver and possible progression to cirrhosis. The patient was told of the possible increased risk of developing liver failure, cirrhosis, ascites, GI bleeding secondary to varices, hepatic encephalopathy, bleeding tendencies and liver cancer. The patient was told of the importance of follow-up. The patient was advised to follow up every 6 months for blood work and imaging studies. The patient agreed and will follow up. The patient was advised to lose weight. I recommend a trial of vitamin E supplementation for the fatty liver. If the liver enzymes remain elevated, the patient may require a trial of Pioglitazone for the fatty liver. I recommend avoid alcohol and hepato-toxic agents. The patient was also advised to avoid NSAIDs, Acetaminophen and any other hepatotoxic drugs. The patient was also advised not to share needles, razors, scissors, nail clippers, etc.. The patient is to continue close follow-up in our office for blood work and exams. If the liver enzymes remain elevated, the patient may require a CT guided liver biopsy to assess the liver parenchyma for possible treatment. We had a long discussion regarding the risks and benefits of the procedure. The patient was told of the risks of bleeding, perforation, infections, emergency surgery and missing lesions. The patient agreed and will follow-up to reassess the symptoms. \par Pancreatic Cystic Lesion: The patient was recently diagnosed with a pancreatic cystic lesion on recent MRCP. The patient complained of prior back pain. She denies any jaundice, pruritus. The patient currently denies any abdominal pain. The patient denies any prior history of EtOH abuse. I recommend a repeat MRI of the pancreas in one year to reassess the pancreatic cystic lesions. The patient and I had a long discussion regarding the potential risks of pancreatic cysts progressing to pancreatic cancer. If the MRI reveals a change in the pancreatic cystic lesion, the patient may require an endoscopic ultrasound of pancreas with possible fine-needle aspiration to better assess the pancreatic cystic lesions. The patient is aware of the importance for followup. The patient agrees and will followup.\par Prior Endoscopic Procedures: The patient had a prior upper endoscopy and colonoscopy performed by another gastroenterologist. I will try to obtain the prior upper endoscopy and colonoscopy reports. The patient is to sign a record release to obtain those records.\par Follow-up: The patient is to follow-up in the office in 4 weeks to reassess the symptoms. The patient was told to call the office if any further problems. \par \par

## 2021-01-08 NOTE — HISTORY OF PRESENT ILLNESS
[None] : had no significant interval events [Heartburn] : denies heartburn [Nausea] : denies nausea [Vomiting] : denies vomiting [Diarrhea] : denies diarrhea [Constipation] : denies constipation [Yellow Skin Or Eyes (Jaundice)] : denies jaundice [Abdominal Pain] : denies abdominal pain [Rectal Pain] : denies rectal pain [Abdominal Swelling] : abdominal swelling [Cholelithiasis] : cholelithiasis [Cholecystectomy] : cholecystectomy [Wt Gain ___ Lbs] : no recent weight gain [Wt Loss ___ Lbs] : no recent weight loss [GERD] : no gastroesophageal reflux disease [Hiatus Hernia] : no hiatus hernia [Peptic Ulcer Disease] : no peptic ulcer disease [Pancreatitis] : no pancreatitis [Kidney Stone] : no kidney stone [Inflammatory Bowel Disease] : no inflammatory bowel disease [Irritable Bowel Syndrome] : no irritable bowel syndrome [Diverticulitis] : no diverticulitis [Alcohol Abuse] : no alcohol abuse [Malignancy] : no malignancy [Abdominal Surgery] : no abdominal surgery [Appendectomy] : no appendectomy [de-identified] : The patient states that she is feeling uncomfortable. The patient denies any jaundice or pruritus.  The patient denies any chronic lower back pain. The patient denies any abdominal pain.  The patient complains of abdominal gas and bloating.  The patient denies any nausea or vomiting.  The patient denies any gastroesophageal reflux disease or dysphagia.  The patient denies any atypical chest pain, shortness of breath or palpitations. The patient denies any diaphoresis. The patient denies any constipation or diarrhea.  The patient has 1 bowel movement every 1 to 2 days.  The patient denies a change in bowel habits.  The patient denies a change in caliber of stool.  The patient denies having mucus discharge with the bowel movements.  The patient denies any bright red blood per rectum, melena or hematemesis.  The patient denies any rectal pain or rectal pruritus.  The patient denies any weight loss or anorexia.  She denies any fevers or chills.  The patient is scheduled for endoscopuc ultrasound and possible ERCP with Dr. Dennys Jefferson on January 21, 2021.  The blood work performed on October 9, 2020 revealed an elevated CA 19-9 of 42 U/ml, and elevated blood glucose of 118 mg/dL, an elevated ferritin level of 173 ng/mL, an elevated cholesterol level of 218 mg/dL, an elevated rheumatoid factor of 14 IU/ml, and elevated ESR of 26 mm/hr and reactive hepatitis A IgG antibody. The COVID 19 IgG antibody was negative with a 0.09 index. The patient was recently evaluated at the [facility] [place] emergency room on December 9, 2019 for back pain. The patient had blood work and imaging studies to assess the symptoms. The blood work performed was remarkable for an elevated BUN of 21 mg/dl and normal liver enzymes. The CAT scan of the abdomen and pelvis with IV contrast performed on December 9, 2019 revealed a 3mm distal CBD calcific density possibly a ductal calculus. Also noted was colonic diverticulosis without CT evidence of acute diverticulitis and no urolithiasis or hydronephrosis. The patient was observed with resolution of the symptoms and was discharged to followup in the office. I reviewed the blood work and imaging studies performed in the emergency room. The MRCP performed on December 30, 2019 revealed s/p cholecystectomy with stable 9 mm dilated CBD. There are now 2 small distal CBD calculi measuring 4 mm. Also noted was a stable 1 cm maximal longitudinal dimension medial pancreatic head cyst. Also noted was hepatic steatosis. 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 The patient admits to having a prior upper endoscopy and colonoscopy performed by another gastroenterologist. According to the patient, the upper endoscopic findings revealed gastritis. The colonoscopic findings were unknown to the patient. The patient admits to a family history of GI problems. The patient’s mother had a history of pancreatic cancer at age 65.  [de-identified] : (-) smoking, (-) ETOH, (-) IVDA\par

## 2021-01-18 DIAGNOSIS — Z01.818 ENCOUNTER FOR OTHER PREPROCEDURAL EXAMINATION: ICD-10-CM

## 2021-01-19 ENCOUNTER — APPOINTMENT (OUTPATIENT)
Dept: DISASTER EMERGENCY | Facility: CLINIC | Age: 86
End: 2021-01-19

## 2021-01-20 LAB — SARS-COV-2 N GENE NPH QL NAA+PROBE: NOT DETECTED

## 2021-01-21 ENCOUNTER — RESULT REVIEW (OUTPATIENT)
Age: 86
End: 2021-01-21

## 2021-01-21 ENCOUNTER — OUTPATIENT (OUTPATIENT)
Dept: OUTPATIENT SERVICES | Facility: HOSPITAL | Age: 86
LOS: 1 days | Discharge: HOME | End: 2021-01-21
Payer: MEDICAID

## 2021-01-21 VITALS
DIASTOLIC BLOOD PRESSURE: 70 MMHG | HEIGHT: 63.39 IN | OXYGEN SATURATION: 100 % | SYSTOLIC BLOOD PRESSURE: 143 MMHG | TEMPERATURE: 98 F | RESPIRATION RATE: 21 BRPM | HEART RATE: 80 BPM | WEIGHT: 143.96 LBS

## 2021-01-21 VITALS
DIASTOLIC BLOOD PRESSURE: 72 MMHG | OXYGEN SATURATION: 100 % | SYSTOLIC BLOOD PRESSURE: 166 MMHG | HEART RATE: 68 BPM | RESPIRATION RATE: 12 BRPM

## 2021-01-21 DIAGNOSIS — Z96.659 PRESENCE OF UNSPECIFIED ARTIFICIAL KNEE JOINT: Chronic | ICD-10-CM

## 2021-01-21 DIAGNOSIS — K80.50 CALCULUS OF BILE DUCT WITHOUT CHOLANGITIS OR CHOLECYSTITIS WITHOUT OBSTRUCTION: ICD-10-CM

## 2021-01-21 DIAGNOSIS — Z98.890 OTHER SPECIFIED POSTPROCEDURAL STATES: Chronic | ICD-10-CM

## 2021-01-21 DIAGNOSIS — Z90.49 ACQUIRED ABSENCE OF OTHER SPECIFIED PARTS OF DIGESTIVE TRACT: Chronic | ICD-10-CM

## 2021-01-21 PROCEDURE — 88312 SPECIAL STAINS GROUP 1: CPT | Mod: 26

## 2021-01-21 PROCEDURE — 88305 TISSUE EXAM BY PATHOLOGIST: CPT | Mod: 26

## 2021-01-21 PROCEDURE — 43264 ERCP REMOVE DUCT CALCULI: CPT | Mod: GC

## 2021-01-21 PROCEDURE — 43239 EGD BIOPSY SINGLE/MULTIPLE: CPT | Mod: GC

## 2021-01-21 PROCEDURE — 74328 X-RAY BILE DUCT ENDOSCOPY: CPT | Mod: 26,GC

## 2021-01-21 PROCEDURE — 43262 ENDO CHOLANGIOPANCREATOGRAPH: CPT | Mod: GC

## 2021-01-21 RX ORDER — LOSARTAN POTASSIUM 100 MG/1
1 TABLET, FILM COATED ORAL
Qty: 0 | Refills: 0 | DISCHARGE

## 2021-01-21 RX ORDER — SIMVASTATIN 20 MG/1
1 TABLET, FILM COATED ORAL
Qty: 0 | Refills: 0 | DISCHARGE

## 2021-01-21 RX ORDER — ASPIRIN/CALCIUM CARB/MAGNESIUM 324 MG
1 TABLET ORAL
Qty: 0 | Refills: 0 | DISCHARGE

## 2021-01-21 NOTE — ASU PATIENT PROFILE, ADULT - PMH
Calculus of bile duct    Fatty liver    GERD (gastroesophageal reflux disease)    Glaucoma    H/O cerebellar ataxia    HLD (hyperlipidemia)    HTN (hypertension)    Migraine    Renal cyst    Vertigo  h/o

## 2021-01-27 ENCOUNTER — APPOINTMENT (OUTPATIENT)
Dept: UROLOGY | Facility: CLINIC | Age: 86
End: 2021-01-27

## 2021-02-02 ENCOUNTER — NON-APPOINTMENT (OUTPATIENT)
Age: 86
End: 2021-02-02

## 2021-02-11 NOTE — H&P ADULT - PROBLEM SELECTOR PROBLEM 1
Comes in to night c/o weakness fever and headache , has been exposed to someone with covid and wants tested.
Dizziness

## 2021-04-09 ENCOUNTER — APPOINTMENT (OUTPATIENT)
Dept: GASTROENTEROLOGY | Facility: CLINIC | Age: 86
End: 2021-04-09
Payer: MEDICAID

## 2021-04-09 VITALS
SYSTOLIC BLOOD PRESSURE: 180 MMHG | OXYGEN SATURATION: 100 % | WEIGHT: 142 LBS | BODY MASS INDEX: 27.88 KG/M2 | DIASTOLIC BLOOD PRESSURE: 84 MMHG | TEMPERATURE: 97.7 F | HEART RATE: 75 BPM | HEIGHT: 60 IN

## 2021-04-09 DIAGNOSIS — R93.89 ABNORMAL FINDINGS ON DIAGNOSTIC IMAGING OF OTHER SPECIFIED BODY STRUCTURES: ICD-10-CM

## 2021-04-09 DIAGNOSIS — L29.0 PRURITUS ANI: ICD-10-CM

## 2021-04-09 PROCEDURE — 99214 OFFICE O/P EST MOD 30 MIN: CPT

## 2021-04-09 PROCEDURE — 99072 ADDL SUPL MATRL&STAF TM PHE: CPT

## 2021-04-09 RX ORDER — SIMETHICONE 180 MG
180 CAPSULE ORAL 4 TIMES DAILY
Qty: 120 | Refills: 3 | Status: ACTIVE | COMMUNITY
Start: 2021-04-09 | End: 1900-01-01

## 2021-04-09 RX ORDER — HYDROCORTISONE 25 MG/G
2.5 CREAM TOPICAL
Qty: 2 | Refills: 3 | Status: ACTIVE | COMMUNITY
Start: 2021-04-09 | End: 1900-01-01

## 2021-04-09 RX ORDER — HYDROCORTISONE ACETATE 25 MG/1
25 SUPPOSITORY RECTAL
Qty: 30 | Refills: 3 | Status: ACTIVE | COMMUNITY
Start: 2021-04-09 | End: 1900-01-01

## 2021-04-09 NOTE — HISTORY OF PRESENT ILLNESS
[None] : had no significant interval events [Heartburn] : denies heartburn [Nausea] : denies nausea [Vomiting] : denies vomiting [Diarrhea] : denies diarrhea [Constipation] : denies constipation [Yellow Skin Or Eyes (Jaundice)] : denies jaundice [Abdominal Pain] : denies abdominal pain [Rectal Pain] : denies rectal pain [Wt Gain ___ Lbs] : recent [unfilled] ~Upound(s) weight gain [Abdominal Swelling] : abdominal swelling [Cholelithiasis] : cholelithiasis [Cholecystectomy] : cholecystectomy [Wt Loss ___ Lbs] : no recent weight loss [GERD] : no gastroesophageal reflux disease [Hiatus Hernia] : no hiatus hernia [Peptic Ulcer Disease] : no peptic ulcer disease [Pancreatitis] : no pancreatitis [Kidney Stone] : no kidney stone [Inflammatory Bowel Disease] : no inflammatory bowel disease [Irritable Bowel Syndrome] : no irritable bowel syndrome [Diverticulitis] : no diverticulitis [Alcohol Abuse] : no alcohol abuse [Malignancy] : no malignancy [Abdominal Surgery] : no abdominal surgery [Appendectomy] : no appendectomy [de-identified] : The patient states that she is feeling better. The patient denies any jaundice or pruritus.  The patient complains of chronic lower back pain. The patient denies any abdominal pain.  The patient complains of abdominal gas and bloating.  The patient denies any nausea or vomiting.  The patient denies any gastroesophageal reflux disease or dysphagia.  The patient denies any atypical chest pain, shortness of breath or palpitations.   The patient is to be evaluated by a cardiologist on Monday.  The patient denies any diaphoresis. The patient denies any constipation or diarrhea.  The patient has 1 bowel movement every other day.  The patient denies a change in bowel habits.  The patient denies a change in caliber of stool.  The patient denies having mucus discharge with the bowel movements.  The patient denies any bright red blood per rectum, melena or hematemesis.  The patient denies any rectal pain or rectal pruritus.  The patient denies any weight loss or anorexia.  The patient admits to gaining weight recently.  She denies any fevers or chills.  The patient is s/p upper endoscopy and ERCP performed by Dr. Dennys Jefferson on January 21, 2021. The upper endoscopy with biopsy performed on January 21, 2021 revealed gastritis and duodenal bulb and duodenum with clean-based ulcers. The pathology revealed antral mucosa with chronic inactive gastritis and was negative for Helicobacter pylori. The ERCP performed on January 21, 2021 revealed choledocholithiasis. The patient is status post ERCP with biliary sphincterotomy and stone removal. The patient tolerated the procedure well.  The blood work performed on October 9, 2020 revealed an elevated CA 19-9 of 42 U/ml, and elevated blood glucose of 118 mg/dL, an elevated ferritin level of 173 ng/mL, an elevated cholesterol level of 218 mg/dL, an elevated rheumatoid factor of 14 IU/ml, and elevated ESR of 26 mm/hr and reactive hepatitis A IgG antibody. The COVID 19 IgG antibody was negative with a 0.09 index. The patient was recently evaluated at the [facility] emergency room on December 9, 2019 for back pain. The patient had blood work and imaging studies to assess the symptoms. The blood work performed was remarkable for an elevated BUN of 21 mg/dl and normal liver enzymes. The CAT scan of the abdomen and pelvis with IV contrast performed on December 9, 2019 revealed a 3mm distal CBD calcific density possibly a ductal calculus. Also noted was colonic diverticulosis without CT evidence of acute diverticulitis and no urolithiasis or hydronephrosis. The patient was observed with resolution of the symptoms and was discharged to followup in the office. I reviewed the blood work and imaging studies performed in the emergency room. The MRCP performed on December 30, 2019 revealed s/p cholecystectomy with stable 9 mm dilated CBD. There are now 2 small distal CBD calculi measuring 4 mm. Also noted was a stable 1 cm maximal longitudinal dimension medial pancreatic head cyst. Also noted was hepatic steatosis. 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 The patient admits to having a prior upper endoscopy and colonoscopy performed by another gastroenterologist. According to the patient, the upper endoscopic findings revealed gastritis. The colonoscopic findings were unknown to the patient. The patient admits to a family history of GI problems. The patient’s mother had a history of pancreatic cancer at age 65.  [de-identified] : (-) smoking, (-) ETOH, (-) IVDA\par

## 2021-04-09 NOTE — ASSESSMENT
[FreeTextEntry1] : Dyspepsia: The patient complains of dyspeptic symptoms.  The patient was advised to abide by an anti-gas (low FOD-MAP) diet.  The patient was given a pamphlet for anti-gas.  The patient and I reviewed the anti-gas diet at length. The patient is to start on a trial of Phazyme one tablet 3 times a day p.r.n. abdominal pain and gas.\par Blood Work: I recommend blood work to assess the patient's symptoms. I recommend a CBC, SMA 24, amylase, lipase, ESR, TFTs, ADRY, rheumatoid factor, celiac sprue panel, IgA, profile for hepatitis A, B, C. ,iron, TIBC, ferritin level and lipid profile.  I also recommend obtaining the recent blood work performed by the patient's PMD.\par Anal Pruritus: The patient had episodes of anal pruritus.  The etiology of the anal pruritus is unclear.  I recommend a trial of Anusol HC suppositories one per rectum QHS and Anusol HC 2.5% cream apply to affected area twice a day PRN hemorrhoidal bleeding or pain.  I recommend a trial of Calmoseptine cream apply to affected area twice a day for rectal discomfort. I recommend Tucks pads for the hemorrhoids.  I recommend starting Sitz baths twice a day for the hemorrhoids.  I recommend avoid wearing tight underwear and use boxers. I recommend avoid touching the perineal area. The patient agrees and will follow up to assess the symptoms.\par Abnormal Imaging Studies: The CAT scan of the abdomen and pelvis with IV contrast performed on December 9, 2019 revealed a 3 mm distal CBD calcific density possibly a ductal calculus. Also noted was colonic diverticulosis without CT evidence of acute diverticulitis and no urolithiasis or hydronephrosis. The MRCP performed on December 30, 2019 revealed s/p cholecystectomy with stable 9 mm dilated CBD. There are now 2 small distal CBD calculi measuring 4 mm. Also noted was a stable 1 cm maximal longitudinal dimension medial pancreatic head cyst. Also noted was hepatic steatosis. 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 \par Choledocholithiasis: The patient was found to have an abnormal study suggestive of choledocholithiasis. The patient is status post upper endoscopy and ERCP performed by Dr. Dennys Jefferson on January 21, 2021. The upper endoscopy with biopsy performed on January 21, 2021 revealed gastritis and duodenal bulb and duodenum with clean-based ulcers. The pathology revealed antral mucosa with chronic inactive gastritis and was negative for Helicobacter pylori. The ERCP performed on January 21, 2021 revealed choledocholithiasis. The patient is status post ERCP with biliary sphincterotomy and stone removal. The patient tolerated the procedure well. \par Fatty Liver: The patient had an imaging study suggestive of fatty infiltration of the liver. The MRCP performed on December 30, 2019 revealed s/p cholecystectomy with stable 9 mm dilated CBD. There are now 2 small distal CBD calculi measuring 4 mm. Also noted was a stable 1 cm maximal longitudinal dimension medial pancreatic head cyst. Also noted was hepatic steatosis. The patient denies any jaundice or pruritus. The patient denies any alcohol use. The patient denies taking large doses of nonsteroidal anti-inflammatory drugs or acetaminophen. The findings are suggestive of fatty liver. The patient and I had a long discussion regarding the risks of fatty liver and possible progression to cirrhosis. The patient was told of the possible increased risk of developing liver failure, cirrhosis, ascites, GI bleeding secondary to varices, hepatic encephalopathy, bleeding tendencies and liver cancer. The patient was told of the importance of follow-up. The patient was advised to follow up every 6 months for blood work and imaging studies. The patient agreed and will follow up. The patient was advised to lose weight. I recommend a trial of vitamin E supplementation for the fatty liver. If the liver enzymes remain elevated, the patient may require a trial of Pioglitazone for the fatty liver. I recommend avoid alcohol and hepato-toxic agents. The patient was also advised to avoid NSAIDs, Acetaminophen and any other hepatotoxic drugs. The patient was also advised not to share needles, razors, scissors, nail clippers, etc.. The patient is to continue close follow-up in our office for blood work and exams. If the liver enzymes remain elevated, the patient may require a CT guided liver biopsy to assess the liver parenchyma for possible treatment. We had a long discussion regarding the risks and benefits of the procedure. The patient was told of the risks of bleeding, perforation, infections, emergency surgery and missing lesions. The patient agreed and will follow-up to reassess the symptoms. \par Pancreatic Cystic Lesion: The patient was recently diagnosed with a pancreatic cystic lesion on recent MRCP. The patient complained of prior back pain. She denies any jaundice, pruritus. The patient currently denies any abdominal pain. The patient denies any prior history of EtOH abuse. The MRCP performed on December 30, 2019 revealed s/p cholecystectomy with stable 9 mm dilated CBD. There are now 2 small distal CBD calculi measuring 4 mm. Also noted was a stable 1 cm maximal longitudinal dimension medial pancreatic head cyst. Also noted was hepatic steatosis. The patient is status post upper endoscopy and ERCP performed by Dr. Dennys Jefferson on January 21, 2021. The upper endoscopy with biopsy performed on January 21, 2021 revealed gastritis and duodenal bulb and duodenum with clean-based ulcers. The pathology revealed antral mucosa with chronic inactive gastritis and was negative for Helicobacter pylori. The ERCP performed on January 21, 2021 revealed choledocholithiasis. The patient is status post ERCP with biliary sphincterotomy and stone removal. The patient tolerated the procedure well. \par Prior Endoscopic Procedures: The patient had a prior upper endoscopy and colonoscopy performed by another gastroenterologist. I will try to obtain the prior upper endoscopy and colonoscopy reports. The patient is to sign another record release to obtain those records.\par Follow-up: The patient is to follow-up in the office in 6 months to reassess the symptoms. The patient was told to call the office if any further problems. \par

## 2021-04-12 ENCOUNTER — NON-APPOINTMENT (OUTPATIENT)
Age: 86
End: 2021-04-12

## 2021-04-12 ENCOUNTER — APPOINTMENT (OUTPATIENT)
Dept: CARDIOLOGY | Facility: CLINIC | Age: 86
End: 2021-04-12
Payer: MEDICAID

## 2021-04-12 VITALS
DIASTOLIC BLOOD PRESSURE: 84 MMHG | RESPIRATION RATE: 16 BRPM | OXYGEN SATURATION: 98 % | SYSTOLIC BLOOD PRESSURE: 145 MMHG | BODY MASS INDEX: 27.88 KG/M2 | TEMPERATURE: 97.4 F | HEART RATE: 104 BPM | HEIGHT: 60 IN | WEIGHT: 142 LBS

## 2021-04-12 PROCEDURE — 93000 ELECTROCARDIOGRAM COMPLETE: CPT

## 2021-04-12 PROCEDURE — 99072 ADDL SUPL MATRL&STAF TM PHE: CPT

## 2021-04-12 PROCEDURE — 99205 OFFICE O/P NEW HI 60 MIN: CPT

## 2021-04-13 LAB
ALBUMIN SERPL ELPH-MCNC: 4.7 G/DL
ALP BLD-CCNC: 152 U/L
ALT SERPL-CCNC: 19 U/L
AMYLASE/CREAT SERPL: 100 U/L
ANION GAP SERPL CALC-SCNC: 15 MMOL/L
APTT BLD: 30.3 SEC
AST SERPL-CCNC: 24 U/L
BASOPHILS # BLD AUTO: 0.03 K/UL
BASOPHILS NFR BLD AUTO: 0.4 %
BILIRUB SERPL-MCNC: 0.3 MG/DL
BUN SERPL-MCNC: 20 MG/DL
CALCIUM SERPL-MCNC: 10.2 MG/DL
CHLORIDE SERPL-SCNC: 107 MMOL/L
CO2 SERPL-SCNC: 22 MMOL/L
CREAT SERPL-MCNC: 0.87 MG/DL
EOSINOPHIL # BLD AUTO: 0.2 K/UL
EOSINOPHIL NFR BLD AUTO: 2.4 %
ERYTHROCYTE [SEDIMENTATION RATE] IN BLOOD BY WESTERGREN METHOD: 19 MM/HR
FERRITIN SERPL-MCNC: 93 NG/ML
GGT SERPL-CCNC: 14 U/L
GLUCOSE SERPL-MCNC: 99 MG/DL
HCT VFR BLD CALC: 42.7 %
HGB BLD-MCNC: 12.8 G/DL
IMM GRANULOCYTES NFR BLD AUTO: 0.6 %
INR PPP: 0.89 RATIO
IRON SATN MFR SERPL: 16 %
IRON SERPL-MCNC: 51 UG/DL
LPL SERPL-CCNC: 56 U/L
LYMPHOCYTES # BLD AUTO: 3.42 K/UL
LYMPHOCYTES NFR BLD AUTO: 41.2 %
MAN DIFF?: NORMAL
MCHC RBC-ENTMCNC: 29.3 PG
MCHC RBC-ENTMCNC: 30 GM/DL
MCV RBC AUTO: 97.7 FL
MONOCYTES # BLD AUTO: 0.75 K/UL
MONOCYTES NFR BLD AUTO: 9 %
NEUTROPHILS # BLD AUTO: 3.85 K/UL
NEUTROPHILS NFR BLD AUTO: 46.4 %
PLATELET # BLD AUTO: 345 K/UL
POTASSIUM SERPL-SCNC: 4 MMOL/L
PROT SERPL-MCNC: 7.5 G/DL
PT BLD: 10.6 SEC
RBC # BLD: 4.37 M/UL
RBC # FLD: 13.3 %
SODIUM SERPL-SCNC: 143 MMOL/L
TIBC SERPL-MCNC: 328 UG/DL
UIBC SERPL-MCNC: 277 UG/DL
WBC # FLD AUTO: 8.3 K/UL

## 2021-04-24 NOTE — ED PROVIDER NOTE - MDM ORDERS SUBMITTED SELECTION
55 yr old  man , non smoker with  moody 1990s presented 3/14 with x9 days worsening cough, subjective fevers, and SOB, with x2-3 days dysuria and central, non-radiating, constant CP. Admitted to medicine unit  for acute hypoxic respiratory failure secondary to pna from covid-19 infection .     Assessment:  1. Acute hypoxic respiratory failure  2. Covid-19 infection   3. Transaminitis  4. Prediabetes  5. Bilateral pneumothorax  6. Septic shock     Plan   -S/p tracheostomy placement 4/21   - S/p G-tube 4/23  - Surgery f/u .. can use G-tube for feeding   -Completed antibiotics  -thoracic f/u noted   -adjust vent as per ABG, repeat ABG improved  -permissive hypercapnia   -S/p diamox as patient with metabolic alkalosis  -PTX  improved post chest tube placement, place chest tube to water seal  -Cont. versed/fentanyl as patient asynchronous with the vent  -Cont. midodrine  -taper off pressors   -monitor biomarkers daily, trending down   -Tube feedings held as plan for PEG today  -dvt/gi prophy  -hemodynamic monitoring   -decrease sedadtion slowly   -Prognosis is guarded . Imaging Studies/Labs/Medications

## 2021-05-17 ENCOUNTER — APPOINTMENT (OUTPATIENT)
Dept: CARDIOLOGY | Facility: CLINIC | Age: 86
End: 2021-05-17
Payer: MEDICAID

## 2021-05-17 VITALS
TEMPERATURE: 97.8 F | BODY MASS INDEX: 27.88 KG/M2 | HEART RATE: 77 BPM | HEIGHT: 60 IN | SYSTOLIC BLOOD PRESSURE: 125 MMHG | OXYGEN SATURATION: 95 % | RESPIRATION RATE: 16 BRPM | DIASTOLIC BLOOD PRESSURE: 80 MMHG | WEIGHT: 142 LBS

## 2021-05-17 PROCEDURE — 99214 OFFICE O/P EST MOD 30 MIN: CPT

## 2021-05-17 RX ORDER — ATORVASTATIN CALCIUM 80 MG/1
TABLET, FILM COATED ORAL
Refills: 0 | Status: DISCONTINUED | COMMUNITY
End: 2021-05-17

## 2021-08-16 ENCOUNTER — APPOINTMENT (OUTPATIENT)
Dept: CARDIOLOGY | Facility: CLINIC | Age: 86
End: 2021-08-16

## 2021-08-31 ENCOUNTER — APPOINTMENT (OUTPATIENT)
Dept: RHEUMATOLOGY | Facility: CLINIC | Age: 86
End: 2021-08-31
Payer: MEDICAID

## 2021-08-31 VITALS
WEIGHT: 139 LBS | TEMPERATURE: 97.9 F | SYSTOLIC BLOOD PRESSURE: 147 MMHG | HEART RATE: 86 BPM | DIASTOLIC BLOOD PRESSURE: 80 MMHG | OXYGEN SATURATION: 98 % | BODY MASS INDEX: 27.29 KG/M2 | RESPIRATION RATE: 16 BRPM | HEIGHT: 60 IN

## 2021-08-31 DIAGNOSIS — M17.10 UNILATERAL PRIMARY OSTEOARTHRITIS, UNSPECIFIED KNEE: ICD-10-CM

## 2021-08-31 DIAGNOSIS — M89.8X9 OTHER SPECIFIED DISORDERS OF BONE, UNSPECIFIED SITE: ICD-10-CM

## 2021-08-31 DIAGNOSIS — Z00.00 ENCOUNTER FOR GENERAL ADULT MEDICAL EXAMINATION W/OUT ABNORMAL FINDINGS: ICD-10-CM

## 2021-08-31 PROCEDURE — 99205 OFFICE O/P NEW HI 60 MIN: CPT | Mod: 25

## 2021-08-31 PROCEDURE — 20611 DRAIN/INJ JOINT/BURSA W/US: CPT | Mod: LT

## 2021-08-31 RX ORDER — METHYLPRED ACET/NACL,ISO-OS/PF 40 MG/ML
40 VIAL (ML) INJECTION
Qty: 1 | Refills: 0 | Status: COMPLETED | OUTPATIENT
Start: 2021-08-31

## 2021-08-31 RX ORDER — LIDOCAINE HYDROCHLORIDE 10 MG/ML
1 INJECTION, SOLUTION INFILTRATION; PERINEURAL
Qty: 0 | Refills: 0 | Status: COMPLETED | OUTPATIENT
Start: 2021-08-31

## 2021-08-31 RX ADMIN — LIDOCAINE HYDROCHLORIDE 0 %: 10 INJECTION, SOLUTION INFILTRATION; PERINEURAL at 00:00

## 2021-08-31 RX ADMIN — METHYLPREDNISOLONE ACETATE 0 MG/ML: 40 INJECTION, SUSPENSION INTRA-ARTICULAR; INTRALESIONAL; INTRAMUSCULAR; SOFT TISSUE at 00:00

## 2021-09-02 PROBLEM — M17.10 ARTHRITIS OF KNEE: Status: ACTIVE | Noted: 2021-08-31

## 2021-09-02 PROBLEM — M89.8X9 BONE PAIN: Status: ACTIVE | Noted: 2021-09-02

## 2021-09-02 PROBLEM — Z00.00 ENCOUNTER FOR PREVENTIVE HEALTH EXAMINATION: Status: ACTIVE | Noted: 2019-09-26

## 2021-09-02 NOTE — ASSESSMENT
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  US guided IAC given for pain relief.  Patient will benefit from physical therapy to help with joint mobility and muscle strengthening.  Referral given.  Quadriceps strengthening exercises demonstrated in the office.  Weight loss has been encouraged to reduce load over the medial joint line.  Viscosupplementation has been encouraged to provide additional lubrication and joint support.  In the interim, Diclofenac gel rec.\par Core strengthening exercises recommended for improved lumbar mobility. Spine films requested as well as a bone density scan to assess bone health. She will continue on Calcium and VitD at the recommended doses of 1200mg and 800IU daily, respectively, whether through foods or supplements.  We reviewed calcium and VitD enriched foods as well.  Food plan discussed for improved weight loss efforts.\par She is in agreement with the above plan and will return in one months' time.\par \par \par

## 2021-09-02 NOTE — REVIEW OF SYSTEMS
[Difficulty Walking] : difficulty walking [Fever] : no fever [Chills] : no chills [Sore Throat] : no sore throat [Hoarseness] : no hoarseness [Chest Pain] : no chest pain [Palpitations] : no palpitations [Cough] : no cough [SOB on Exertion] : no shortness of breath during exertion [Joint Swelling] : no joint swelling [Skin Lesions] : no skin lesions [Depression] : no depression [Feelings Of Weakness] : no feelings of weakness [Easy Bleeding] : no tendency for easy bleeding [Easy Bruising] : no tendency for easy bruising

## 2021-09-02 NOTE — HISTORY OF PRESENT ILLNESS
[Arthralgias] : arthralgias [Decreased ROM] : decreased range of motion [Morning Stiffness] : morning stiffness [Difficulty Standing] : difficulty standing [Difficulty Walking] : difficulty walking [Muscle Cramping] : muscle cramping [FreeTextEntry1] : Patient presents with pain in the lower back as well as in the left knee. She reports increased pain in the lumbar spine especially upon prolonged sitting or standing. She reports walking many blocks before feeling symptoms of stiffness of the left knee similar to what she felt in the right knee before TKR 10 years prior. She explains laxity symptoms especially after walking 7-8 blocks  as well as climbing stairs. Despite complaints she continues to remain active. She is apprehensive of initiating physical therapy secondary to the pandemic. She otherwise denies accompanied joint swelling,rash or systemic symptoms. She otherwise denies systemic symptoms or motor sensory disturbances. [Weight Loss] : no weight loss [Malaise] : no malaise [Fever] : no fever [Fatigue] : no fatigue [Malar Facial Rash] : no malar facial rash [Skin Lesions] : no lesions [Skin Nodules] : no skin nodules [Oral Ulcers] : no oral ulcers [Cough] : no cough [Dry Mouth] : no dry mouth [Shortness of Breath] : no shortness of breath [Chest Pain] : no chest pain [Falls] : no falls [Muscle Weakness] : no muscle weakness [Muscle Spasms] : no muscle spasms [Eye Redness] : no eye redness [Dry Eyes] : no dry eyes

## 2021-09-02 NOTE — PHYSICAL EXAM
[General Appearance - In No Acute Distress] : in no acute distress [General Appearance - Alert] : alert [Sclera] : the sclera and conjunctiva were normal [Neck Appearance] : the appearance of the neck was normal [Auscultation Breath Sounds / Voice Sounds] : lungs were clear to auscultation bilaterally [Edema] : there was no peripheral edema [Musculoskeletal - Swelling] : no joint swelling seen [Motor Tone] : muscle strength and tone were normal [] : no rash [Skin Lesions] : no skin lesions [Oriented To Time, Place, And Person] : oriented to person, place, and time [Impaired Insight] : insight and judgment were intact [FreeTextEntry1] : antalgic gait; no active synovitis

## 2021-09-02 NOTE — PROCEDURE
[Today's Date:] : Date: [unfilled] [Patient] : the patient [Risks] : risks [Benefits] : benefits [Consent Obtained] : written consent was obtained prior to the procedure and is detailed in the patient's record [Diagnostic] : diagnostic  [#1 Site: ______] : #1 site identified in the [unfilled] [___ml 1% Lidocaine] : [unfilled] ml of 1% lidocaine [Depomedrol ___ mg] : Depomedrol [unfilled] mg [Tolerated Well] : the patient tolerated the procedure well [No Complications] : there were no complications [Patient Instructed to Call] : patient was instructed to call if redness at site, a decrease in range of motion or an increase in pain is noted after procedure. [de-identified] : 22guage x 2.5in needle inserted  [FreeTextEntry1] : Study Date: 8/31/2021\par Study Type: Guidance of needle placement \par Indication: Pain in left knee joint ; obesity\par Study Site: LT knee\par \par Equipment: [employer] e 4-12MHz linear transducer  \par \par Findings: Orthogonal views of the anterior, posterior, medial and lateral\par aspects of the knee were obtained in grey scale.\par \par Mild hypoechoic pocket in the suprapatellar fossa in long and short  axis with \par cortical irregularities of hyperechoic linear structure  , marginal osteophytes and joint space narrowing;  thickening of the medial collateral ligament with abutting of the medial meniscus.  Patellar tendon with fine alternating parallel lines with speckled pattern on short axis.  No muscle or tendon or tendon sheath abnormalities seen in the anterior or posterior compartments.  No fluid identified in popliteal fossa.\par \par The use of a [employer] e 12 MHz linear transducer with live ultrasound guidance of the knee was necessary given the patient's BMI and local body habitus overlying and obscuring the accurate identification of normal body bony anatomy used to identify the injection site and the depth of soft tissue space;the location of the needle tip and intra-articular delivery of the medication while avoiding neurovascular structures confirmed. Orthogonal views of the suprapatellar synovial pouch was taken with US reflecting mild effusion .  After prepping the lateral knee with betadine, a 22 x2.0guage needle was advanced to the synovial cavity under direct US visualization using in-plane technique.  10cc of clear fluid was aspirated followed by 40mg depomedrol and 1% lidocaine injectate. \par \par Impressions: Successful aspiration and injection of the LT knee using US guidance.

## 2021-10-05 ENCOUNTER — APPOINTMENT (OUTPATIENT)
Dept: RHEUMATOLOGY | Facility: CLINIC | Age: 86
End: 2021-10-05

## 2021-10-12 ENCOUNTER — APPOINTMENT (OUTPATIENT)
Dept: RHEUMATOLOGY | Facility: CLINIC | Age: 86
End: 2021-10-12
Payer: MEDICAID

## 2021-10-12 VITALS
WEIGHT: 140 LBS | BODY MASS INDEX: 27.48 KG/M2 | SYSTOLIC BLOOD PRESSURE: 147 MMHG | HEIGHT: 60 IN | TEMPERATURE: 95.1 F | HEART RATE: 88 BPM | DIASTOLIC BLOOD PRESSURE: 81 MMHG | OXYGEN SATURATION: 98 % | RESPIRATION RATE: 16 BRPM

## 2021-10-12 DIAGNOSIS — I83.90 ASYMPTOMATIC VARICOSE VEINS OF UNSPECIFIED LOWER EXTREMITY: ICD-10-CM

## 2021-10-12 PROCEDURE — 96372 THER/PROPH/DIAG INJ SC/IM: CPT

## 2021-10-12 PROCEDURE — 99213 OFFICE O/P EST LOW 20 MIN: CPT | Mod: 25

## 2021-10-12 RX ORDER — DENOSUMAB 60 MG/ML
60 INJECTION SUBCUTANEOUS
Qty: 1 | Refills: 0 | Status: COMPLETED | OUTPATIENT
Start: 2021-10-12

## 2021-10-12 RX ADMIN — DENOSUMAB 0 MG/ML: 60 INJECTION SUBCUTANEOUS at 00:00

## 2021-10-13 NOTE — REVIEW OF SYSTEMS
[Fever] : no fever [Chills] : no chills [Sore Throat] : no sore throat [Hoarseness] : no hoarseness [Chest Pain] : no chest pain [Palpitations] : no palpitations [Cough] : no cough [SOB on Exertion] : no shortness of breath during exertion [Joint Swelling] : no joint swelling [Skin Lesions] : no skin lesions [Difficulty Walking] : difficulty walking [Depression] : no depression [Feelings Of Weakness] : no feelings of weakness [Easy Bleeding] : no tendency for easy bleeding [Easy Bruising] : no tendency for easy bruising

## 2021-10-13 NOTE — ASSESSMENT
[FreeTextEntry1] : Patient with lumbar and knee DJD; OP:\par \par Patient will benefit from continued physical therapy to help with joint mobility and muscle strengthening.  Quadriceps strengthening exercises demonstrated in the office.  Weight loss has been encouraged to reduce load over the medial joint line.  Viscosupplementation has been encouraged to provide additional lubrication and joint support.  In the interim, Diclofenac gel rec.\par Core strengthening exercises recommended for improved lumbar mobility. She will continue on Calcium and VitD at the recommended doses of 1200mg and 800IU daily, respectively, whether through foods or supplements.  We reviewed calcium and VitD enriched foods as well.  Food plan discussed for improved weight loss efforts.  \par Denosumab injection given; next dose 4/2022.   Discussed the side effects including myalgias and hypocalcemia that may arise from injection.  Blood tests scheduled at PCP in the next two months. \par Leg elvation and compression wear encouraged, scripts given.\par \par She is in agreement with the above plan and will return in one months' time.\par \par \par

## 2021-10-13 NOTE — PHYSICAL EXAM
[General Appearance - Alert] : alert [General Appearance - In No Acute Distress] : in no acute distress [Sclera] : the sclera and conjunctiva were normal [Neck Appearance] : the appearance of the neck was normal [Auscultation Breath Sounds / Voice Sounds] : lungs were clear to auscultation bilaterally [Edema] : there was no peripheral edema [Musculoskeletal - Swelling] : no joint swelling seen [Motor Tone] : muscle strength and tone were normal [FreeTextEntry1] : antalgic gait; no active synovitis  [] : no rash [Skin Lesions] : no skin lesions [Oriented To Time, Place, And Person] : oriented to person, place, and time [Impaired Insight] : insight and judgment were intact

## 2021-10-13 NOTE — HISTORY OF PRESENT ILLNESS
[FreeTextEntry1] : Patient returns for follow up with recent bone density of the L-spine reflecting T score of -2.7 and at the hip of \par -2.0 the patient denies recent falls or fractures. She explains the left knee pain has improved moderately with her intra-articular cortisone injection given at the end of August as well as physical therapy that she takes once weekly. She explains not having had the chance of getting compression socks however is trying to elevate legs in the setting of varicosities. She otherwise denies joint swelling or systemic symptoms.

## 2021-10-15 ENCOUNTER — APPOINTMENT (OUTPATIENT)
Dept: GASTROENTEROLOGY | Facility: CLINIC | Age: 86
End: 2021-10-15

## 2021-10-15 NOTE — ED ADULT NURSE NOTE - CAS EDN DISCHARGE ASSESSMENT
Outpatient Physical Therapy Ortho Treatment Note  [facility]     Patient Name: Susy Hanson  : 1958  MRN: [id number]  Today's Date: 10/15/2021      Visit Date: 10/15/2021    Visit Dx:    ICD-10-CM ICD-9-CM   1. At risk for lymphedema  Z91.89 V49.89   2. Breast pain  N64.4 611.71   3. Muscle tightness  M62.89 728.9       Patient Active Problem List   Diagnosis   • Encounter for general adult medical examination without abnormal findings   • Essential hypertension   • Irritable bowel syndrome   • Mixed anxiety depressive disorder   • Mixed hyperlipidemia   • Obesity   • Encounter for screening for malignant neoplasm of colon   • Type 2 diabetes mellitus with other diabetic neurological complication (HCC)   • Vitamin D deficiency   • Hypertensive urgency   • Peripheral neuropathy   • ANDREAS (obstructive sleep apnea)   • Snoring   • Hypersomnia   • Chest pain, atypical   • Breast neoplasm, Tis (DCIS), left        Past Medical History:   Diagnosis Date   • Anxiety    • Breast cancer (HCC)     LEFT BREAST 2021   • Depression    • DM (diabetes mellitus) (HCC)    • Hyperlipidemia    • Hypertension     SAW CARDIO/ STRESS TEST  - NOW MEDS MANAGED BY PCP    • Irritable bowel syndrome    • Peripheral neuropathy    • PONV (postoperative nausea and vomiting)    • Sleep apnea     WEARS CPAP        Past Surgical History:   Procedure Laterality Date   • BLADDER REPAIR     • BREAST BIOPSY     • BREAST RECONSTRUCTION Bilateral 2021    Procedure: BILATERAL PREPECTORALPLACMENT TISSUE EXPANDERS AND ALLODERM;  Surgeon: Heri Arreaga MD;  Location: [facility];  Service: Plastics;  Laterality: Bilateral;   • CARDIOVASCULAR STRESS TEST     • HYSTERECTOMY     • MASTECTOMY W/ SENTINEL NODE BIOPSY Bilateral 2021    Procedure: bilateral total mastectomy, left axillary sentinel lymph node biopsy, possible reconstruction.;  Surgeon: Asia Perkins MD;  Location: [facility];   Service: General;  Laterality: Bilateral;   • TUBAL ABDOMINAL LIGATION  1981                        PT Assessment/Plan     Row Name 10/15/21 1002          PT Assessment    Assessment Comments Pt with reduced pain and tightness today, improved PROM and improved tissue extensibility. Continued manual therapy and PROM. Inc tissue thickening/scar tissue L > R breast with indentation L lateral incision end. Pt to have exchange surgery 12/23/21.  -LB            PT Plan    PT Plan Comments Continue STM/scar massage to L > R breast.  -LB           User Key  (r) = Recorded By, (t) = Taken By, (c) = Cosigned By    Initials Name Provider Type    Edita Chicas, PT Physical Therapist                   OP Exercises     Row Name 10/15/21 0900             Subjective Comments    Subjective Comments I am doing pretty well. I think it is helping. I have the exchange scheduled but they could only do it 12/23/21.  -LB              Subjective Pain    Able to rate subjective pain? yes  -LB      Pre-Treatment Pain Level 3  -LB              Total Minutes    48634 - PT Therapeutic Exercise Minutes 10  -LB      49685 - PT Manual Therapy Minutes 30  -LB              Exercise 1    Exercise Name 1 B flexion supine  -LB      Reps 1 10  -LB      Additional Comments hands clasped  -LB              Exercise 2    Exercise Name 2 wall wash flexion  -LB      Reps 2 10  -LB              Exercise 3    Exercise Name 3 supine butterfly stretch  -LB      Reps 3 3  -LB      Time 3 20  -LB            User Key  (r) = Recorded By, (t) = Taken By, (c) = Cosigned By    Initials Name Provider Type    Edita Chicas, PT Physical Therapist                         Manual Rx (last 36 hours)     Manual Treatments     Row Name 10/15/21 0900             Total Minutes    38969 - PT Manual Therapy Minutes 30  -LB              Manual Rx 1    Manual Rx 1 Location STM to B axilla, breast, avoiding incisions  -LB      Manual Rx 1 Type STM; PROM to B shoulders, pec  stretch, GH posterior/inferior mobs  -LB      Manual Rx 1 Duration 30  -LB            User Key  (r) = Recorded By, (t) = Taken By, (c) = Cosigned By    Initials Name Provider Type    Edita Chicas, PT Physical Therapist                 PT OP Goals     Row Name 10/15/21 1000          PT Short Term Goals    STG Date to Achieve 10/15/21  -LB     STG 1 Pt will initiate self massage to address discomfort and begin resensitization process.  -LB     STG 1 Progress Ongoing  -LB     STG 2 Pt will find bra that fits appropriately and does not irritate.  -LB     STG 2 Progress Ongoing  -LB     STG 3 Pt will demonstrate 160 deg BUE flexion without discomfort.  -LB     STG 3 Progress Progressing  -LB            Long Term Goals    LTG Date to Achieve 10/31/21  -LB     LTG 1 Pt will tolerate work duties without inc B breast discomfort.  -LB     LTG 1 Progress Ongoing  -LB     LTG 2 Pt will report dec overall disability per [employer] from 48% disability to 30% or less.  -LB     LTG 2 Progress Ongoing  -LB     LTG 3 Pt will attend bra fitting and acquire appropriately fitted bra for continued support and comfort.  -LB     LTG 3 Progress Ongoing  -LB           User Key  (r) = Recorded By, (t) = Taken By, (c) = Cosigned By    Initials Name Provider Type    Edita Chicas PT Physical Therapist                Therapy Education  Education Details: reviewed self scar massage  Given: Symptoms/condition management, Posture/body mechanics, Edema management  Program: Reinforced  How Provided: Verbal, Demonstration  Provided to: Patient  Level of Understanding: Teach back education performed, Verbalized, Demonstrated              Time Calculation:   Start Time: 0930  Stop Time: 1015  Time Calculation (min): 45 min  Total Timed Code Minutes- PT: 42 minute(s)  Timed Charges  00817 - PT Therapeutic Exercise Minutes: 10  60716 - PT Manual Therapy Minutes: 30  Total Minutes  Timed Charges Total Minutes: 40   Total Minutes: 40  Therapy  Charges for Today     Code Description Service Date Service Provider Modifiers Qty    29724443673 HC PT THER PROC EA 15 MIN 10/15/2021 Edita Reynaga, PT GP 1    31443230315 HC PT MANUAL THERAPY EA 15 MIN 10/15/2021 Edita Reynaga, PT GP 2                    Edita Reynaga, PT  10/15/2021      Alert and oriented to person, place and time

## 2021-10-25 ENCOUNTER — APPOINTMENT (OUTPATIENT)
Dept: CARDIOLOGY | Facility: CLINIC | Age: 86
End: 2021-10-25
Payer: MEDICAID

## 2021-10-25 ENCOUNTER — NON-APPOINTMENT (OUTPATIENT)
Age: 86
End: 2021-10-25

## 2021-10-25 VITALS
WEIGHT: 140 LBS | OXYGEN SATURATION: 96 % | HEIGHT: 60 IN | HEART RATE: 87 BPM | RESPIRATION RATE: 16 BRPM | DIASTOLIC BLOOD PRESSURE: 67 MMHG | TEMPERATURE: 97.2 F | BODY MASS INDEX: 27.48 KG/M2 | SYSTOLIC BLOOD PRESSURE: 114 MMHG

## 2021-10-25 PROCEDURE — 99214 OFFICE O/P EST MOD 30 MIN: CPT

## 2021-10-25 PROCEDURE — 93000 ELECTROCARDIOGRAM COMPLETE: CPT

## 2021-11-09 NOTE — PROGRESS NOTE ADULT - SUBJECTIVE AND OBJECTIVE BOX
-- DO NOT REPLY / DO NOT REPLY ALL --  -- Message is from the [facility]--      Patient is requesting a medication refill - medication is on active list    Was Medication Pended? Yes.    Rx Name and Dose:  Victoza 18 MG/3ML pen-injector     Duration: 90 days    Pharmacy  [id number] - [place], [place] - [address] [facility] [address]    Patient confirmed the above pharmacy as correct?  Yes    Does this request need an existing or new prescription at a pharmacy to be sent to a new pharmacy location?   No    Caller Information       Type Contact Phone    11/09/2021 12:01 PM CST Phone (Incoming) Kayli Heaton (Self) [phone] (M)          Alternative phone number: na    Turnaround time given to caller:   \"This message will be sent to [state Provider's name]. The clinical team will fulfill your request as soon as they review your message.\"   CHIEF COMPLAINT:Patient is a 84y old  Female who presents with a chief complaint of dizziness and upper abdominal pain.Pt appears comfortable.    	  REVIEW OF SYSTEMS:  CONSTITUTIONAL: No fever, weight loss, or fatigue  EYES: No eye pain, visual disturbances, or discharge  ENT:  No difficulty hearing, tinnitus, vertigo; No sinus or throat pain  NECK: No pain or stiffness  RESPIRATORY: No cough, wheezing, chills or hemoptysis; No Shortness of Breath  CARDIOVASCULAR: No chest pain, palpitations, passing out, dizziness, or leg swelling  GASTROINTESTINAL: No abdominal or epigastric pain. No nausea, vomiting, or hematemesis; No diarrhea or constipation. No melena or hematochezia.  GENITOURINARY: No dysuria, frequency, hematuria, or incontinence  NEUROLOGICAL: No headaches, memory loss, loss of strength, numbness, or tremors  SKIN: No itching, burning, rashes, or lesions   LYMPH Nodes: No enlarged glands  ENDOCRINE: No heat or cold intolerance; No hair loss  MUSCULOSKELETAL: No joint pain or swelling; No muscle, back, or extremity pain  PSYCHIATRIC: No depression, anxiety, mood swings, or difficulty sleeping  HEME/LYMPH: No easy bruising, or bleeding gums  ALLERGY AND IMMUNOLOGIC: No hives or eczema	      PHYSICAL EXAM:  T(C): 36.8 (05-12-19 @ 08:00), Max: 36.9 (05-11-19 @ 23:41)  HR: 73 (05-12-19 @ 08:00) (63 - 73)  BP: 185/71 (05-12-19 @ 08:00) (145/61 - 185/71)  RR: 17 (05-12-19 @ 08:00) (16 - 18)  SpO2: 100% (05-12-19 @ 08:00) (97% - 100%)  Wt(kg): --  I&O's Summary      Appearance: Normal	  HEENT:   Normal oral mucosa, PERRL, EOMI	  Lymphatic: No lymphadenopathy  Cardiovascular: Normal S1 S2, No JVD, No murmurs, No edema  Respiratory: Lungs clear to auscultation	  Psychiatry: A & O x 3, Mood & affect appropriate  Gastrointestinal:  Soft, Non-tender, + BS	  Skin: No rashes, No ecchymoses, No cyanosis	  Neurologic: Non-focal  Extremities: Normal range of motion, No clubbing, cyanosis or edema  Vascular: Peripheral pulses palpable 2+ bilaterally    MEDICATIONS  (STANDING):  aspirin enteric coated 81 milliGRAM(s) Oral daily  atorvastatin 80 milliGRAM(s) Oral at bedtime  cefTRIAXone   IVPB 1 Gram(s) IV Intermittent every 24 hours  cefTRIAXone   IVPB      cholecalciferol 1000 Unit(s) Oral daily  donepezil 10 milliGRAM(s) Oral at bedtime  enoxaparin Injectable 40 milliGRAM(s) SubCutaneous daily  ergocalciferol 82189 Unit(s) Oral <User Schedule>  losartan 25 milliGRAM(s) Oral two times a day  meclizine 25 milliGRAM(s) Oral every 8 hours  memantine 10 milliGRAM(s) Oral daily  pantoprazole  Injectable 40 milliGRAM(s) IV Push daily      TELEMETRY: sr 40's	      	  LABS:	 	                       13.2   8.31  )-----------( 289      ( 12 May 2019 07:18 )             41.3     05-12    142  |  107  |  15  ----------------------------<  119<H>  4.3   |  28  |  0.92    Ca    9.0      12 May 2019 07:18  Phos  3.3     05-12  Mg     2.5     05-12        Lipid Profile: Cholesterol 198    HDL 71      HgA1c: Hemoglobin A1C, Whole Blood: 5.8 % (05-10 @ 09:22)    TSH: Thyroid Stimulating Hormone, Serum: 1.07 uU/mL (05-10 @ 06:04)      	  OBSERVATIONS:  Mitral Valve: Mitral annular calcification, and calcified  mitral leaflets with normal diastolic opening. Mild to  moderate mitral regurgitation.  Aortic Root: Normal aortic root.  Aortic Valve: Aortic valve leaflet morphology not well  visualized, appears calcified with normal opening. Mild  aortic regurgitation.  Left Atrium: Normal left atrium.  LA volume index = 31  cc/m2.  Left Ventricle: Normal left ventricular systolic function.  Mild diastolic dysfunction (stage I). Normal left  ventricular internal dimensions and wall thicknesses.  Right Heart: Normal right atrium. Normal right ventricular  size and function. There is mild tricuspid regurgitation.  There is trace pulmonic regurgitation.  Pericardium/PleuraNormal pericardium with no pericardial  effusion.  Hemodynamic: RA Pressure is 8 mm Hg. RV systolic pressure  is 37 mm Hg.

## 2021-12-21 ENCOUNTER — RX RENEWAL (OUTPATIENT)
Age: 86
End: 2021-12-21

## 2022-01-06 ENCOUNTER — APPOINTMENT (OUTPATIENT)
Dept: RHEUMATOLOGY | Facility: CLINIC | Age: 87
End: 2022-01-06
Payer: MEDICAID

## 2022-01-06 VITALS
RESPIRATION RATE: 16 BRPM | DIASTOLIC BLOOD PRESSURE: 79 MMHG | TEMPERATURE: 97.1 F | HEART RATE: 72 BPM | WEIGHT: 142 LBS | BODY MASS INDEX: 27.73 KG/M2 | SYSTOLIC BLOOD PRESSURE: 127 MMHG | OXYGEN SATURATION: 98 %

## 2022-01-06 PROCEDURE — 99214 OFFICE O/P EST MOD 30 MIN: CPT

## 2022-01-06 NOTE — HISTORY OF PRESENT ILLNESS
[FreeTextEntry1] : Patient returns for follow up and explains moderate improvement in mobility and ability to perform ADLs.   She explains the left knee pain has improved moderately with her intra-articular cortisone injection given at the end of August as well as physical therapy that she takes once weekly. She has been wearing compression socks daily and has noted considerable improvement in LE swelling.   Patient with  bone density of the L-spine reflecting T score of -2.7 and at the hip of -2.0;  last Prolia injection in October 2021 without complication;  the patient denies recent falls or fractures. \par  \par She otherwise denies joint swelling or systemic symptoms.

## 2022-01-06 NOTE — ASSESSMENT
[FreeTextEntry1] : Patient with lumbar and knee DJD; OP:\par \par Patient will benefit from continued physical therapy to help with joint mobility and muscle strengthening.  Quadriceps strengthening exercises demonstrated in the office.  Weight loss has been encouraged to reduce load over the medial joint line.  Viscosupplementation has been encouraged to provide additional lubrication and joint support.  In the interim, Diclofenac gel rec.\par Core strengthening exercises recommended for improved lumbar mobility. She will continue on Calcium and VitD at the recommended doses of 1200mg and 800IU daily, respectively, whether through foods or supplements.  We reviewed calcium and VitD enriched foods as well.  Food plan discussed for improved weight loss efforts.  \par Denosumab next dose:  4/2022.   Discussed the side effects including myalgias and hypocalcemia that may arise from injection.  \par Leg elevation and compression socks continued.\par \par She is in agreement with the above plan and will return in two months' time.\par \par \par

## 2022-04-12 ENCOUNTER — APPOINTMENT (OUTPATIENT)
Dept: RHEUMATOLOGY | Facility: CLINIC | Age: 87
End: 2022-04-12
Payer: MEDICAID

## 2022-04-12 VITALS
TEMPERATURE: 98.2 F | WEIGHT: 137 LBS | HEART RATE: 88 BPM | BODY MASS INDEX: 26.9 KG/M2 | HEIGHT: 60 IN | OXYGEN SATURATION: 98 % | DIASTOLIC BLOOD PRESSURE: 83 MMHG | RESPIRATION RATE: 16 BRPM | SYSTOLIC BLOOD PRESSURE: 130 MMHG

## 2022-04-12 PROCEDURE — 20611 DRAIN/INJ JOINT/BURSA W/US: CPT | Mod: LT

## 2022-04-12 RX ORDER — METHYLPRED ACET/NACL,ISO-OS/PF 40 MG/ML
40 VIAL (ML) INJECTION
Qty: 1 | Refills: 0 | Status: COMPLETED | OUTPATIENT
Start: 2022-04-12

## 2022-04-12 RX ORDER — LIDOCAINE HYDROCHLORIDE 10 MG/ML
1 INJECTION, SOLUTION INFILTRATION; PERINEURAL
Qty: 0 | Refills: 0 | Status: COMPLETED | OUTPATIENT
Start: 2022-04-12

## 2022-04-12 RX ADMIN — METHYLPREDNISOLONE ACETATE 0 MG/ML: 40 INJECTION, SUSPENSION INTRA-ARTICULAR; INTRALESIONAL; INTRAMUSCULAR; INTRASYNOVIAL; SOFT TISSUE at 00:00

## 2022-04-12 RX ADMIN — LIDOCAINE HYDROCHLORIDE 0 %: 10 INJECTION, SOLUTION EPIDURAL; INFILTRATION; INTRACAUDAL; PERINEURAL at 00:00

## 2022-04-16 NOTE — PROCEDURE
[Other Date:___] : Date: [unfilled] [Therapeutic] : therapeutic [#1 Site: ______] : #1 site identified in the [unfilled] [Patient] : the patient [Risks] : risks [Benefits] : benefits [Consent Obtained] : written consent was obtained prior to the procedure and is detailed in the patient's record [Betadine] : betadine solution [___ml 1% Lidocaine] : [unfilled] ml of 1% lidocaine [Depomedrol ___ mg] : Depomedrol [unfilled] mg [Tolerated Well] : the patient tolerated the procedure well [No Complications] : there were no complications [Patient Instructed to Call] : patient was instructed to call if redness at site, a decrease in range of motion or an increase in pain is noted after procedure. [de-identified] : 22 g x 2 in inserted  [FreeTextEntry1] : Study Date: 4/12/2022\par Study Type: Guidance of needle placement \par Indication: Pain in left knee joint ; obesity\par Study Site: Left knee\par Equipment: "[employer] e 4-12MHz linear transducer  \par \par Findings: The use of a [employer] e 12 MHz linear transducer with live ultrasound guidance of the knee was necessary given the patient's BMI and local body habitus overlying and obscuring the accurate identification of normal body bony anatomy used to identify the injection site and the depth of soft tissue space;the location of the needle tip and intra-articular delivery of the medication while avoiding neurovascular structures confirmed. Orthogonal views of the suprapatellar synovial pouch was taken with US reflecting mild effusion .  After prepping the lateral knee with betadine, a 22 x2.0guage needle was advanced to the synovial cavity under direct US visualization using in-plane technique with 40mg of methylprednisolone and 1% lidocaine was injected.\par \par Impressions: Successful injection of the LT knee using US guidance.\par

## 2022-04-16 NOTE — ASSESSMENT
[FreeTextEntry1] : Patient with LT knee pain; OP:\par \par Patient to refrain from nsaid tx;  Tylenol prn.  US guided IAC given for pain relief.  Patient will benefit from physical therapy to help with joint mobility and muscle strengthening.  Quadriceps strengthening exercises demonstrated in the office.  Weight loss has been encouraged to reduce load over the medial joint line.  Viscosupplementation has been encouraged to provide additional lubrication and joint support.  In the interim, Diclofenac gel encouraged.  \par She will continue on Calcium and VitD at the recommended doses of 1200mg and 800IU daily, respectively, whether through foods or supplements.  We reviewed calcium and VitD enriched foods as well.  \par \par She is in agreement with the above plan and will return in one months' time.\par \par

## 2022-04-25 ENCOUNTER — NON-APPOINTMENT (OUTPATIENT)
Age: 87
End: 2022-04-25

## 2022-04-25 ENCOUNTER — APPOINTMENT (OUTPATIENT)
Dept: CARDIOLOGY | Facility: CLINIC | Age: 87
End: 2022-04-25
Payer: MEDICAID

## 2022-04-25 VITALS
HEART RATE: 104 BPM | OXYGEN SATURATION: 99 % | DIASTOLIC BLOOD PRESSURE: 74 MMHG | WEIGHT: 135 LBS | BODY MASS INDEX: 26.5 KG/M2 | SYSTOLIC BLOOD PRESSURE: 128 MMHG | RESPIRATION RATE: 16 BRPM | HEIGHT: 60 IN | TEMPERATURE: 97.8 F

## 2022-04-25 PROCEDURE — 93000 ELECTROCARDIOGRAM COMPLETE: CPT

## 2022-04-25 PROCEDURE — 99214 OFFICE O/P EST MOD 30 MIN: CPT

## 2022-05-11 ENCOUNTER — APPOINTMENT (OUTPATIENT)
Dept: GASTROENTEROLOGY | Facility: CLINIC | Age: 87
End: 2022-05-11
Payer: MEDICAID

## 2022-05-11 ENCOUNTER — LABORATORY RESULT (OUTPATIENT)
Age: 87
End: 2022-05-11

## 2022-05-11 VITALS
TEMPERATURE: 97.3 F | HEART RATE: 91 BPM | DIASTOLIC BLOOD PRESSURE: 77 MMHG | WEIGHT: 135 LBS | HEIGHT: 60 IN | OXYGEN SATURATION: 95 % | SYSTOLIC BLOOD PRESSURE: 145 MMHG | BODY MASS INDEX: 26.5 KG/M2

## 2022-05-11 DIAGNOSIS — K59.00 CONSTIPATION, UNSPECIFIED: ICD-10-CM

## 2022-05-11 PROCEDURE — 99214 OFFICE O/P EST MOD 30 MIN: CPT

## 2022-05-11 NOTE — HISTORY OF PRESENT ILLNESS
[None] : had no significant interval events [Heartburn] : denies heartburn [Nausea] : denies nausea [Vomiting] : denies vomiting [Diarrhea] : denies diarrhea [Yellow Skin Or Eyes (Jaundice)] : denies jaundice [Abdominal Pain] : denies abdominal pain [Rectal Pain] : denies rectal pain [Constipation] : constipation [Abdominal Swelling] : abdominal swelling [Cholelithiasis] : cholelithiasis [Cholecystectomy] : cholecystectomy [Wt Gain ___ Lbs] : no recent weight gain [Wt Loss ___ Lbs] : no recent weight loss [GERD] : no gastroesophageal reflux disease [Hiatus Hernia] : no hiatus hernia [Peptic Ulcer Disease] : no peptic ulcer disease [Pancreatitis] : no pancreatitis [Kidney Stone] : no kidney stone [Inflammatory Bowel Disease] : no inflammatory bowel disease [Irritable Bowel Syndrome] : no irritable bowel syndrome [Diverticulitis] : no diverticulitis [Alcohol Abuse] : no alcohol abuse [Malignancy] : no malignancy [Abdominal Surgery] : no abdominal surgery [Appendectomy] : no appendectomy [de-identified] : The patient states that she is feeling uncomfortable x 6 months. The patient denies any jaundice or pruritus.  The patient denies any chronic lower back pain. The patient denies any abdominal pain.  The patient complains of abdominal gas and bloating.  The patient denies any nausea or vomiting.  The patient denies any gastroesophageal reflux disease or dysphagia.  The patient denies any atypical chest pain, shortness of breath or palpitations.  The patient denies any diaphoresis. The patient complains of constipation but denies any diarrhea.  The patient has 1 bowel movement every 1 to 2 days. The patient complains of a change in bowel habits.  The patient complains of a change in caliber of stool.  The patient denies having mucus discharge with the bowel movements.  The patient denies any bright red blood per rectum, melena or hematemesis.  The patient denies any rectal pain or rectal pruritus.  The patient complains of weight loss and anorexia.  The patient admits to losing 5 pounds over the past 6 months. She denies any fevers or chills.  The patient is s/p upper endoscopy and ERCP performed by Dr. Dennys Jefferson on January 21, 2021. The upper endoscopy with biopsy performed on January 21, 2021 revealed gastritis and duodenal bulb and duodenum with clean-based ulcers. The pathology revealed antral mucosa with chronic inactive gastritis and was negative for Helicobacter pylori. The ERCP performed on January 21, 2021 revealed choledocholithiasis. The patient is status post ERCP with biliary sphincterotomy and stone removal. The patient tolerated the procedure well. The blood work performed on April 9, 2021 revealed a normal hemoglobin/hematocrit level of 12.8/42.7, respectively, a normal total bilirubin of 0.3 mg/dL, an elevated alkaline phosphatase of 152 U/L, a normal AST/ALT/GGTP of 24/19/14 U/L, respectively. The patient was previously evaluated at the [facility] emergency room on December 9, 2019 for back pain. The patient had blood work and imaging studies to assess the symptoms. The blood work performed was remarkable for an elevated BUN of 21 mg/dl and normal liver enzymes. The CAT scan of the abdomen and pelvis with IV contrast performed on December 9, 2019 revealed a 3mm distal CBD calcific density possibly a ductal calculus. Also noted was colonic diverticulosis without CT evidence of acute diverticulitis and no urolithiasis or hydronephrosis. The patient was observed with resolution of the symptoms and was discharged to followup in the office. I reviewed the blood work and imaging studies performed in the emergency room. The MRCP performed on December 30, 2019 revealed s/p cholecystectomy with stable 9 mm dilated CBD. There are now 2 small distal CBD calculi measuring 4 mm. Also noted was a stable 1 cm maximal longitudinal dimension medial pancreatic head cyst. Also noted was hepatic steatosis. 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 The patient admits to having a prior upper endoscopy and colonoscopy performed by another gastroenterologist. According to the patient, the upper endoscopic findings revealed gastritis. The colonoscopic findings were unknown to the patient. The patient admits to a family history of GI problems. The patient’s mother had a history of pancreatic cancer at age 65.  [de-identified] : (-) smoking, (-) ETOH, (-) IVDA\par

## 2022-05-12 ENCOUNTER — NON-APPOINTMENT (OUTPATIENT)
Age: 87
End: 2022-05-12

## 2022-05-13 ENCOUNTER — NON-APPOINTMENT (OUTPATIENT)
Age: 87
End: 2022-05-13

## 2022-05-16 ENCOUNTER — NON-APPOINTMENT (OUTPATIENT)
Age: 87
End: 2022-05-16

## 2022-05-16 LAB
25(OH)D3 SERPL-MCNC: 45.6 NG/ML
A1AT SERPL-MCNC: 111 MG/DL
AFP-TM SERPL-MCNC: 2.6 NG/ML
ALBUMIN SERPL ELPH-MCNC: 4.8 G/DL
ALP BLD-CCNC: 89 U/L
ALT SERPL-CCNC: 14 U/L
AMYLASE/CREAT SERPL: 96 U/L
ANA SER IF-ACNC: NEGATIVE
ANION GAP SERPL CALC-SCNC: 20 MMOL/L
AST SERPL-CCNC: 19 U/L
BASOPHILS # BLD AUTO: 0.04 K/UL
BASOPHILS NFR BLD AUTO: 0.5 %
BILIRUB SERPL-MCNC: 0.2 MG/DL
BUN SERPL-MCNC: 15 MG/DL
CALCIUM SERPL-MCNC: 10.5 MG/DL
CANCER AG19-9 SERPL-ACNC: 41 U/ML
CEA SERPL-MCNC: 3.7 NG/ML
CHLORIDE SERPL-SCNC: 100 MMOL/L
CHOLEST SERPL-MCNC: 234 MG/DL
CO2 SERPL-SCNC: 23 MMOL/L
CREAT SERPL-MCNC: 0.81 MG/DL
EGFR: 71 ML/MIN/1.73M2
EOSINOPHIL # BLD AUTO: 0.16 K/UL
EOSINOPHIL NFR BLD AUTO: 2.1 %
ERYTHROCYTE [SEDIMENTATION RATE] IN BLOOD BY WESTERGREN METHOD: 30 MM/HR
FERRITIN SERPL-MCNC: 102 NG/ML
GGT SERPL-CCNC: 13 U/L
GLIADIN IGA SER QL: <5 UNITS
GLIADIN IGG SER QL: <5 UNITS
GLIADIN PEPTIDE IGA SER-ACNC: NEGATIVE
GLIADIN PEPTIDE IGG SER-ACNC: NEGATIVE
GLUCOSE SERPL-MCNC: 112 MG/DL
HBV CORE IGG+IGM SER QL: NONREACTIVE
HBV CORE IGM SER QL: NONREACTIVE
HBV E AB SER QL: NONREACTIVE
HBV E AG SER QL: NONREACTIVE
HBV SURFACE AB SER QL: NONREACTIVE
HBV SURFACE AG SER QL: NONREACTIVE
HCT VFR BLD CALC: 44.1 %
HCV AB SER QL: NONREACTIVE
HCV S/CO RATIO: 0.11 S/CO
HDLC SERPL-MCNC: 77 MG/DL
HEPATITIS A IGG ANTIBODY: REACTIVE
HGB BLD-MCNC: 13.6 G/DL
IGA SER QL IEP: 214 MG/DL
IMM GRANULOCYTES NFR BLD AUTO: 0.3 %
IRON SATN MFR SERPL: 14 %
IRON SERPL-MCNC: 45 UG/DL
LDLC SERPL CALC-MCNC: 126 MG/DL
LPL SERPL-CCNC: 57 U/L
LYMPHOCYTES # BLD AUTO: 2.97 K/UL
LYMPHOCYTES NFR BLD AUTO: 38.9 %
MAN DIFF?: NORMAL
MCHC RBC-ENTMCNC: 29.5 PG
MCHC RBC-ENTMCNC: 30.8 GM/DL
MCV RBC AUTO: 95.7 FL
MITOCHONDRIA AB SER IF-ACNC: NORMAL
MONOCYTES # BLD AUTO: 0.73 K/UL
MONOCYTES NFR BLD AUTO: 9.6 %
NEUTROPHILS # BLD AUTO: 3.71 K/UL
NEUTROPHILS NFR BLD AUTO: 48.6 %
NONHDLC SERPL-MCNC: 157 MG/DL
PLATELET # BLD AUTO: 363 K/UL
POTASSIUM SERPL-SCNC: 4 MMOL/L
PROT SERPL-MCNC: 7.7 G/DL
RBC # BLD: 4.61 M/UL
RBC # FLD: 12.9 %
RHEUMATOID FACT SER QL: 12 IU/ML
SMOOTH MUSCLE AB SER QL IF: NORMAL
SODIUM SERPL-SCNC: 142 MMOL/L
TIBC SERPL-MCNC: 314 UG/DL
TRIGL SERPL-MCNC: 157 MG/DL
TSH SERPL-ACNC: 1.56 UIU/ML
TTG IGA SER IA-ACNC: <1.2 U/ML
TTG IGA SER-ACNC: NEGATIVE
TTG IGG SER IA-ACNC: <1.2 U/ML
TTG IGG SER IA-ACNC: NEGATIVE
UIBC SERPL-MCNC: 269 UG/DL
WBC # FLD AUTO: 7.63 K/UL

## 2022-05-20 ENCOUNTER — EMERGENCY (EMERGENCY)
Facility: HOSPITAL | Age: 87
LOS: 1 days | Discharge: HOME | End: 2022-05-20
Attending: EMERGENCY MEDICINE
Payer: MEDICAID

## 2022-05-20 VITALS
RESPIRATION RATE: 16 BRPM | HEIGHT: 61 IN | WEIGHT: 138.01 LBS | DIASTOLIC BLOOD PRESSURE: 73 MMHG | TEMPERATURE: 98 F | OXYGEN SATURATION: 98 % | SYSTOLIC BLOOD PRESSURE: 125 MMHG | HEART RATE: 90 BPM

## 2022-05-20 VITALS
HEART RATE: 85 BPM | SYSTOLIC BLOOD PRESSURE: 124 MMHG | TEMPERATURE: 98 F | OXYGEN SATURATION: 98 % | DIASTOLIC BLOOD PRESSURE: 74 MMHG | RESPIRATION RATE: 17 BRPM

## 2022-05-20 DIAGNOSIS — Z96.659 PRESENCE OF UNSPECIFIED ARTIFICIAL KNEE JOINT: Chronic | ICD-10-CM

## 2022-05-20 DIAGNOSIS — Z98.890 OTHER SPECIFIED POSTPROCEDURAL STATES: Chronic | ICD-10-CM

## 2022-05-20 DIAGNOSIS — Z90.49 ACQUIRED ABSENCE OF OTHER SPECIFIED PARTS OF DIGESTIVE TRACT: Chronic | ICD-10-CM

## 2022-05-20 PROCEDURE — 99284 EMERGENCY DEPT VISIT MOD MDM: CPT | Mod: 25

## 2022-05-20 PROCEDURE — 93971 EXTREMITY STUDY: CPT | Mod: 26,LT

## 2022-05-20 PROCEDURE — 93971 EXTREMITY STUDY: CPT

## 2022-05-20 PROCEDURE — 99284 EMERGENCY DEPT VISIT MOD MDM: CPT

## 2022-05-20 NOTE — ED PROVIDER NOTE - NSFOLLOWUPINSTRUCTIONS_ED_ALL_ED_FT
Musculoskeletal Pain      Musculoskeletal pain refers to aches and pains in your bones, joints, muscles, and the tissues that surround them. This pain can occur in any part of the body. It can last for a short time (acute) or a long time (chronic).    A physical exam, lab tests, and imaging studies may be done to find the cause of your musculoskeletal pain.      Follow these instructions at home:    Lifestyle   •Try to control or lower your stress levels. Stress increases muscle tension and can worsen musculoskeletal pain. It is important to recognize when you are anxious or stressed and learn ways to manage it. This may include:  •Meditation or yoga.      •Cognitive or behavioral therapy.      •Acupuncture or massage therapy.        •You may continue all activities unless the activities cause more pain. When the pain gets better, slowly resume your normal activities. Gradually increase the intensity and duration of your activities or exercise.        Managing pain, stiffness, and swelling                   •Treatment may include medicines for pain and inflammation that are taken by mouth or applied to the skin. Take over-the-counter and prescription medicines only as told by your health care provider.      •When your pain is severe, bed rest may be helpful. Lie or sit in any position that is comfortable, but get out of bed and walk around at least every couple of hours.    •If directed, apply heat to the affected area as often as told by your health care provider. Use the heat source that your health care provider recommends, such as a moist heat pack or a heating pad.  •Place a towel between your skin and the heat source.      •Leave the heat on for 20–30 minutes.      •Remove the heat if your skin turns bright red. This is especially important if you are unable to feel pain, heat, or cold. You may have a greater risk of getting burned.      •If directed, put ice on the painful area. To do this:  •Put ice in a plastic bag.      •Place a towel between your skin and the bag.      •Leave the ice on for 20 minutes, 2–3 times a day.      •Remove the ice if your skin turns bright red. This is very important. If you cannot feel pain, heat, or cold, you have a greater risk of damage to the area.        General instructions     •Your health care provider may recommend that you see a physical therapist. This person can help you come up with a safe exercise program.      •If told by your health care provider, do physical therapy exercises to improve movement and strength in the affected area.      •Keep all follow-up visits. This is important. This includes any physical therapy visits.        Contact a health care provider if:    •Your pain gets worse.      •Medicines do not help ease your pain.      •You cannot use the part of your body that hurts, such as your arm, leg, or neck.      •You have trouble sleeping.      •You have trouble doing your normal activities.        Get help right away if:    •You have a new injury and your pain is worse or different.      •You feel numb or you have tingling in the painful area.        Summary    •Musculoskeletal pain refers to aches and pains in your bones, joints, muscles, and the tissues that surround them.      •This pain can occur in any part of the body.      •Your health care provider may recommend that you see a physical therapist. This person can help you come up with a safe exercise program. Do any exercises as told by your physical therapist.      •Lower your stress level. Stress can worsen musculoskeletal pain. Ways to lower stress may include meditation, yoga, cognitive or behavioral therapy, acupuncture, and massage therapy.      This information is not intended to replace advice given to you by your health care provider. Make sure you discuss any questions you have with your health care provider.      Document Revised: 04/22/2021 Document Reviewed: 03/31/2021    Elsevier Patient Education © 2022 [employer]

## 2022-05-20 NOTE — ED PROVIDER NOTE - OBJECTIVE STATEMENT
85 y/o female Italian speaking and is here with daughter presenting for lower left extremity swelling and pain since Monday. She endorses a lot of walking and denies any trauma, chest pain and shortness of breath. She went to her PMD today who referred her to the ER to rule out DVT. LEXY.

## 2022-05-20 NOTE — ED PROVIDER NOTE - NSICDXPASTMEDICALHX_GEN_ALL_CORE_FT
PAST MEDICAL HISTORY:  Calculus of bile duct     Fatty liver     GERD (gastroesophageal reflux disease)     Glaucoma     H/O cerebellar ataxia     HLD (hyperlipidemia)     HTN (hypertension)     Migraine     Renal cyst     Vertigo h/o

## 2022-05-20 NOTE — ED PROVIDER NOTE - PATIENT PORTAL LINK FT
You can access the FollowMyHealth Patient Portal offered by [facility] by registering at the following website: [email or web address]. By joining [facility]’s FollowMyHealth portal, you will also be able to view your health information using other applications (apps) compatible with our system.

## 2022-05-20 NOTE — ED PROVIDER NOTE - MUSCULOSKELETAL, MLM
Mostly left anterior calf tenderness and no erythema. Spine appears normal, range of motion is not limited.

## 2022-08-15 ENCOUNTER — APPOINTMENT (OUTPATIENT)
Dept: NEUROLOGY | Facility: CLINIC | Age: 87
End: 2022-08-15

## 2022-08-15 VITALS
SYSTOLIC BLOOD PRESSURE: 140 MMHG | WEIGHT: 137 LBS | HEART RATE: 92 BPM | HEIGHT: 60 IN | OXYGEN SATURATION: 97 % | TEMPERATURE: 97.3 F | DIASTOLIC BLOOD PRESSURE: 85 MMHG | BODY MASS INDEX: 26.9 KG/M2

## 2022-08-15 DIAGNOSIS — R41.3 OTHER AMNESIA: ICD-10-CM

## 2022-08-15 DIAGNOSIS — F03.90 UNSPECIFIED DEMENTIA W/OUT BEHAVIORAL DISTURBANCE: ICD-10-CM

## 2022-08-15 PROCEDURE — 99215 OFFICE O/P EST HI 40 MIN: CPT

## 2022-08-15 NOTE — HISTORY OF PRESENT ILLNESS
[FreeTextEntry1] : Repeats herself; very forgetful; needs help and reminders for appointments, schedules tasks. Denies headache.

## 2022-08-15 NOTE — PHYSICAL EXAM
[Registration Intact] : recent registration memory intact [Repeating Phrases] : no difficulty repeating a phrase [Comprehension] : comprehension intact [Cranial Nerves Optic (II)] : visual acuity intact bilaterally,  visual fields full to confrontation, pupils equal round and reactive to light [Cranial Nerves Oculomotor (III)] : extraocular motion intact [Cranial Nerves Trigeminal (V)] : facial sensation intact symmetrically [Cranial Nerves Facial (VII)] : face symmetrical [Cranial Nerves Vestibulocochlear (VIII)] : hearing was intact bilaterally [Cranial Nerves Glossopharyngeal (IX)] : tongue and palate midline [Cranial Nerves Accessory (XI - Cranial And Spinal)] : head turning and shoulder shrug symmetric [Cranial Nerves Hypoglossal (XII)] : there was no tongue deviation with protrusion [Limited Balance] : the patient's balance was impaired [Dysdiadochokinesia Bilaterally] : present bilaterally [2+] : Triceps left 2+ [1+] : Ankle jerk left 1+ [Sclera] : the sclera and conjunctiva were normal [Extraocular Movements] : extraocular movements were intact [PERRL With Normal Accommodation] : pupils were equal in size, round, reactive to light, with normal accommodation [Outer Ear] : the ears and nose were normal in appearance [Oropharynx] : the oropharynx was normal [Neck Appearance] : the appearance of the neck was normal [Neck Cervical Mass (___cm)] : no neck mass was observed [Jugular Venous Distention Increased] : there was no jugular-venous distention [Thyroid Diffuse Enlargement] : the thyroid was not enlarged [Thyroid Nodule] : there were no palpable thyroid nodules [Short Term Intact] : short term memory impaired [Span Intact] : the attention span was decreased [Naming Objects] : difficulty naming common objects [Fluency] : fluency not intact [Vocabulary] : inadequate range of vocabulary [Paresis Pronator Drift Right-Sided] : no pronator drift on the right [Paresis Pronator Drift Left-Sided] : no pronator drift on the left [Motor Strength Upper Extremities Bilaterally] : strength was normal in both upper extremities [Motor Strength Lower Extremities Bilaterally] : strength was normal in both lower extremities [Coordination - Dysmetria Impaired Finger-to-Nose Bilateral] : not present [Coordination - Dysmetria Impaired Heel-to-Shin Bilateral] : not present [Plantar Reflex Right Only] : normal on the right [Plantar Reflex Left Only] : normal on the left

## 2022-08-15 NOTE — DISCUSSION/SUMMARY
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 She will not be able to complete cognitive memory dependent  and textual parts of immigration and naturalization services tests \par

## 2022-08-18 ENCOUNTER — APPOINTMENT (OUTPATIENT)
Dept: RHEUMATOLOGY | Facility: CLINIC | Age: 87
End: 2022-08-18

## 2022-08-18 VITALS
SYSTOLIC BLOOD PRESSURE: 134 MMHG | DIASTOLIC BLOOD PRESSURE: 85 MMHG | TEMPERATURE: 98.4 F | OXYGEN SATURATION: 98 % | WEIGHT: 137 LBS | BODY MASS INDEX: 26.9 KG/M2 | HEIGHT: 60 IN | HEART RATE: 78 BPM | RESPIRATION RATE: 16 BRPM

## 2022-08-18 DIAGNOSIS — S93.103A: ICD-10-CM

## 2022-08-18 DIAGNOSIS — M47.816 SPONDYLOSIS W/OUT MYELOPATHY OR RADICULOPATHY, LUMBAR REGION: ICD-10-CM

## 2022-08-18 PROCEDURE — 20610 DRAIN/INJ JOINT/BURSA W/O US: CPT | Mod: LT

## 2022-08-18 PROCEDURE — 99214 OFFICE O/P EST MOD 30 MIN: CPT | Mod: 25

## 2022-08-18 RX ORDER — LIDOCAINE HYDROCHLORIDE 10 MG/ML
1 INJECTION, SOLUTION INFILTRATION; PERINEURAL
Qty: 0 | Refills: 0 | Status: COMPLETED | OUTPATIENT
Start: 2022-08-18

## 2022-08-18 RX ORDER — METHYLPRED ACET/NACL,ISO-OS/PF 40 MG/ML
40 VIAL (ML) INJECTION
Qty: 1 | Refills: 0 | Status: COMPLETED | OUTPATIENT
Start: 2022-08-18

## 2022-08-18 RX ADMIN — METHYLPREDNISOLONE ACETATE 0 MG/ML: 40 INJECTION, SUSPENSION INTRA-ARTICULAR; INTRALESIONAL; INTRAMUSCULAR; INTRASYNOVIAL; SOFT TISSUE at 00:00

## 2022-08-18 RX ADMIN — LIDOCAINE HYDROCHLORIDE 0 %: 10 INJECTION, SOLUTION EPIDURAL; INFILTRATION; INTRACAUDAL; PERINEURAL at 00:00

## 2022-08-19 LAB
ERYTHROCYTE [SEDIMENTATION RATE] IN BLOOD BY WESTERGREN METHOD: 24 MM/HR
FOLATE SERPL-MCNC: 7.5 NG/ML
RPR SER-TITR: NORMAL
VIT B12 SERPL-MCNC: 487 PG/ML

## 2022-08-21 PROBLEM — S93.103A: Status: ACTIVE | Noted: 2022-08-18

## 2022-08-21 PROBLEM — M47.816 DEGENERATIVE JOINT DISEASE (DJD) OF LUMBAR SPINE: Status: ACTIVE | Noted: 2021-09-02

## 2022-08-21 NOTE — PHYSICAL EXAM
[General Appearance - Alert] : alert [General Appearance - In No Acute Distress] : in no acute distress [Sclera] : the sclera and conjunctiva were normal [Neck Appearance] : the appearance of the neck was normal [Auscultation Breath Sounds / Voice Sounds] : lungs were clear to auscultation bilaterally [Edema] : there was no peripheral edema [Musculoskeletal - Swelling] : no joint swelling seen [Motor Tone] : muscle strength and tone were normal [] : no rash [Skin Lesions] : no skin lesions [Oriented To Time, Place, And Person] : oriented to person, place, and time [Impaired Insight] : insight and judgment were intact [FreeTextEntry1] : antalgic gait; tender over LT pes anserine bursa; mild tenderness over medial joint lines bilaterally; subluxation of the second MP joint over the first on the left [Motor Exam] : the motor exam was normal

## 2022-08-21 NOTE — ASSESSMENT
[FreeTextEntry1] : Patient with lumbar and knee DJD; OP; pes anserine bursitis:\par \par Patient will benefit from continued physical therapy to help with joint mobility and muscle strengthening.  Quadriceps strengthening exercises demonstrated in the office.  Weight loss has been encouraged to reduce load over the medial joint line.  Cortisone injection given to the LT pes anserine bursa for pain relief.  Viscosupplementation has been encouraged to provide additional lubrication and joint support.  In the interim, Diclofenac gel rec.\par Core strengthening exercises recommended for improved lumbar mobility. She will continue on Calcium and VitD at the recommended doses of 1200mg and 800IU daily, respectively, whether through foods or supplements.  We reviewed calcium and VitD enriched foods as well.  Food plan discussed for improved weight loss efforts.  \par Patient due for Denosumab dosing for OP.\par Leg elevation and compression socks continued.  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

## 2022-08-21 NOTE — HISTORY OF PRESENT ILLNESS
[FreeTextEntry1] : Patient returns for follow up and explains worsening pain over the left knee.  She reports pain inferior to the kneecap with accompanied swelling which limits her ability to go up and down the stairs ; she is unable to walk r more than a few blocks which she can normally do independently.  Last PT was May of this year.  She has been wearing compression socks daily and has noted considerable improvement in LE swelling.   Patient with  bone density of the L-spine reflecting T score of -2.7 and at the hip of -2.0;  last Prolia injection in October 2021 without complication;  the patient denies recent falls or fractures. \par  \par She otherwise denies joint swelling or systemic symptoms.

## 2022-08-31 LAB
AMPA-R ABCBA: NEGATIVE
AMPHIPHYSIN IGG TITR SER IF: NEGATIVE TITER
CASPR2-IGG CBA, S: NEGATIVE
CV2 IGG TITR SER: NEGATIVE TITER
GABA-B ABCBA: NEGATIVE
GAD65 AB SER-MCNC: 0 NMOL/L
GLIAL NUC TYPE 1 AB TITR SER: NEGATIVE TITER
HU1 AB TITR SER: NEGATIVE TITER
HU2 AB TITR SER IF: NEGATIVE TITER
HU3 AB TITR SER: NEGATIVE TITER
IGLON5 IFA, S: NEGATIVE
IMMUNOLOGIST REVIEW: NORMAL
LGI1-IGG CBA, S: NEGATIVE
NIF IFA, S: NEGATIVE
NMDA-R ABCBA: NEGATIVE
PCA-1 AB TITR SER: NEGATIVE TITER
PCA-2 AB TITR SER: NEGATIVE TITER
PCA-TR AB TITR SER: NEGATIVE TITER
REFLEX ADDED: NORMAL

## 2022-09-07 ENCOUNTER — APPOINTMENT (OUTPATIENT)
Dept: MRI IMAGING | Facility: CLINIC | Age: 87
End: 2022-09-07

## 2022-09-07 ENCOUNTER — OUTPATIENT (OUTPATIENT)
Dept: OUTPATIENT SERVICES | Facility: HOSPITAL | Age: 87
LOS: 1 days | End: 2022-09-07
Payer: MEDICAID

## 2022-09-07 DIAGNOSIS — Z98.890 OTHER SPECIFIED POSTPROCEDURAL STATES: Chronic | ICD-10-CM

## 2022-09-07 DIAGNOSIS — Z96.659 PRESENCE OF UNSPECIFIED ARTIFICIAL KNEE JOINT: Chronic | ICD-10-CM

## 2022-09-07 DIAGNOSIS — R41.3 OTHER AMNESIA: ICD-10-CM

## 2022-09-07 DIAGNOSIS — Z90.49 ACQUIRED ABSENCE OF OTHER SPECIFIED PARTS OF DIGESTIVE TRACT: Chronic | ICD-10-CM

## 2022-09-07 PROCEDURE — 70551 MRI BRAIN STEM W/O DYE: CPT

## 2022-09-07 PROCEDURE — 70551 MRI BRAIN STEM W/O DYE: CPT | Mod: 26

## 2022-10-03 ENCOUNTER — APPOINTMENT (OUTPATIENT)
Dept: CARDIOLOGY | Facility: CLINIC | Age: 87
End: 2022-10-03

## 2022-11-01 NOTE — H&P PST ADULT - DOES PATIENT HAVE ADVANCE DIRECTIVE
11/01/2022  Tej Gonzalez is a 63 y.o., male here for TENZIN/cardioversion for atrial fibrillation with RVR.  H/o PFO.  Hep C from a blood transfusion as a teenager, has been treated, no other medical problems.      Past Medical History:   Diagnosis Date    Blood transfusion     Hepatitis C     over one year ago; treated and now resolved; sees hepatology at     Hypertension     PFO (patent foramen ovale)        Past Surgical History:   Procedure Laterality Date    COLONOSCOPY N/A 5/24/2017    Procedure: COLONOSCOPY;  Surgeon: LUCY Cruz MD;  Location: [facility] ([address] [place] [address]);  Service: Endoscopy;  Laterality: N/A;  Patient OK with Dr. Cruz as Surgeon.     shattered leg      teenager    SINUS SURGERY      TONSILLECTOMY      UVULECTOMY           Pre-op Assessment    I have reviewed the Patient Summary Reports.   I have reviewed the NPO Status.   I have reviewed the Medications.     Review of Systems  Anesthesia Hx:  No problems with previous Anesthesia  History of prior surgery of interest to airway management or planning: Denies Family Hx of Anesthesia complications.   Denies Personal Hx of Anesthesia complications.   Cardiovascular:   Exercise tolerance: good Denies Hypertension.  Denies MI.    Denies Angina.  Denies YANG. ECG has been reviewed. Held Xarelto   Pulmonary:  Pulmonary Normal    Hepatic/GI:   Hepatitis (s/p treated), C    Neurological:  Neurology Normal        Physical Exam  General:  Obesity, Well nourished, Cooperative and Alert      Airway/Jaw/Neck:  Airway Findings: Mouth Opening: Normal   Tongue: Normal   General Airway Assessment: Adult Mallampati: II  TM Distance: Normal, at least 6 cm   Neck ROM: Normal ROM       Dental:  Dental Findings: In tact     Chest/Lungs:  Chest/Lungs Findings: Normal Respiratory Rate      Heart/Vascular:  Heart Findings: Rate: Normal        Mental  Status:  Mental Status Findings:  Alert and Oriented         Anesthesia Plan  Type of Anesthesia, risks & benefits discussed:  Anesthesia Type:  Gen Natural Airway    Patient's Preference:   Plan Factors:          Intra-op Monitoring Plan: Standard ASA Monitors  Intra-op Monitoring Plan Comments:   Post Op Pain Control Plan: multimodal analgesia  Post Op Pain Control Plan Comments:     Induction:   IV  Beta Blocker:  Patient is on a Beta-Blocker and has received one dose within the past 24 hours (No further documentation required).       Informed Consent: Informed consent signed with the Patient and all parties understand the risks and agree with anesthesia plan.  All questions answered.  Anesthesia consent signed with patient.  ASA Score: 3     Day of Surgery Review of History & Physical:              Ready For Surgery From Anesthesia Perspective.           Physical Exam  General: Obesity, Well nourished, Cooperative and Alert    Airway:  Mallampati: II   Mouth Opening: Normal  TM Distance: Normal, at least 6 cm  Tongue: Normal  Neck ROM: Normal ROM    Dental:  In tact    Chest/Lungs:  Normal Respiratory Rate    Heart:  Rate: Normal          Anesthesia Plan  Type of Anesthesia, risks & benefits discussed:    Anesthesia Type: Gen Natural Airway  Intra-op Monitoring Plan: Standard ASA Monitors  Post Op Pain Control Plan: multimodal analgesia  Induction:  IV  Informed Consent: Informed consent signed with the Patient and all parties understand the risks and agree with anesthesia plan.  All questions answered.   ASA Score: 3    Ready For Surgery From Anesthesia Perspective.       .       HCP provided and explained/No

## 2022-11-09 ENCOUNTER — APPOINTMENT (OUTPATIENT)
Dept: GASTROENTEROLOGY | Facility: CLINIC | Age: 87
End: 2022-11-09

## 2022-11-09 VITALS
WEIGHT: 138 LBS | BODY MASS INDEX: 27.09 KG/M2 | SYSTOLIC BLOOD PRESSURE: 156 MMHG | OXYGEN SATURATION: 98 % | HEART RATE: 79 BPM | HEIGHT: 60 IN | TEMPERATURE: 95.5 F | DIASTOLIC BLOOD PRESSURE: 78 MMHG

## 2022-11-09 DIAGNOSIS — K86.2 CYST OF PANCREAS: ICD-10-CM

## 2022-11-09 DIAGNOSIS — K80.50 CALCULUS OF BILE DUCT W/OUT CHOLANGITIS OR CHOLECYSTITIS W/OUT OBSTRUCTION: ICD-10-CM

## 2022-11-09 DIAGNOSIS — R10.13 EPIGASTRIC PAIN: ICD-10-CM

## 2022-11-09 DIAGNOSIS — K76.0 FATTY (CHANGE OF) LIVER, NOT ELSEWHERE CLASSIFIED: ICD-10-CM

## 2022-11-09 PROCEDURE — 99214 OFFICE O/P EST MOD 30 MIN: CPT

## 2022-11-09 RX ORDER — LATANOPROST/PF 0.005 %
0.01 DROPS OPHTHALMIC (EYE)
Qty: 2 | Refills: 0 | Status: ACTIVE | COMMUNITY
Start: 2022-08-24

## 2022-11-09 RX ORDER — SIMETHICONE 125 MG/1
125 TABLET, CHEWABLE ORAL
Qty: 120 | Refills: 3 | Status: ACTIVE | COMMUNITY
Start: 2022-11-09 | End: 1900-01-01

## 2022-11-09 NOTE — HISTORY OF PRESENT ILLNESS
[de-identified] : The CAT scan of the abdomen and pelvis with IV contrast performed on December 9, 2019 revealed a 3mm distal CBD calcific density possibly a ductal calculus. Also noted was colonic diverticulosis without CT evidence of acute diverticulitis and no urolithiasis or hydronephrosis. \par  [FreeTextEntry1] : The MRCP performed on December 30, 2019 revealed s/p cholecystectomy with stable 9 mm dilated CBD. There are now 2 small distal CBD calculi measuring 4 mm. Also noted was a stable 1 cm maximal longitudinal dimension medial pancreatic head cyst. Also noted was hepatic steatosis. 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

## 2022-11-09 NOTE — ASSESSMENT
[FreeTextEntry1] : Dyspepsia: The patient complains of dyspeptic symptoms.  The patient was advised to continue to abide by an anti-gas (low FOD-MAP) diet.  The patient was previously given a pamphlet for anti-gas (low FOD-MAP).  The patient and I reviewed the anti-gas (low FOD-MAP)diet at length again. The patient is to continue on a trial of Simethicone one tablet 4 times a day p.r.n. abdominal pain and gas.\par Blood Work: I recommend blood work to assess the liver. I recommend a CBC, SMA 24, amylase, lipase, ESR, TFTs, ADRY, rheumatoid factor, celiac sprue panel, IgA, profile for hepatitis A, B, C. ,AFP, alpha 1 anti-trypsin  antibody, ceruloplasmin level, iron, TIBC, ferritin level, AMA, anti smooth muscle antibody and PT/INR/PTT.  I also recommend obtaining the recent blood work performed by the patient's PMD.\par Abnormal Imaging Studies: The CAT scan of the abdomen and pelvis with IV contrast performed on December 9, 2019 revealed a 3 mm distal CBD calcific density possibly a ductal calculus. Also noted was colonic diverticulosis without CT evidence of acute diverticulitis and no urolithiasis or hydronephrosis. The MRCP performed on December 30, 2019 revealed s/p cholecystectomy with stable 9 mm dilated CBD. There are now 2 small distal CBD calculi measuring 4 mm. Also noted was a stable 1 cm maximal longitudinal dimension medial pancreatic head cyst. Also noted was hepatic steatosis. 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 \par Choledocholithiasis: The patient was found to have an abnormal study suggestive of choledocholithiasis. The patient is status post upper endoscopy and ERCP performed by Dr. Dennys Jefferson on January 21, 2021. The upper endoscopy with biopsy performed on January 21, 2021 revealed gastritis and duodenal bulb and duodenum with clean-based ulcers. The pathology revealed antral mucosa with chronic inactive gastritis and was negative for Helicobacter pylori. The ERCP performed on January 21, 2021 revealed choledocholithiasis. The patient is status post ERCP with biliary sphincterotomy and stone removal. The patient tolerated the procedure well. \par Fatty Liver: The patient had an imaging study suggestive of fatty infiltration of the liver. The MRCP performed on December 30, 2019 revealed s/p cholecystectomy with stable 9 mm dilated CBD. There are now 2 small distal CBD calculi measuring 4 mm. Also noted was a stable 1 cm maximal longitudinal dimension medial pancreatic head cyst. Also noted was hepatic steatosis. The patient denies any jaundice or pruritus. The patient denies any alcohol use. The patient denies taking large doses of nonsteroidal anti-inflammatory drugs or acetaminophen. The findings are suggestive of fatty liver. The patient and I had a long discussion regarding the risks of fatty liver and possible progression to cirrhosis. The patient was told of the possible increased risk of developing liver failure, cirrhosis, ascites, GI bleeding secondary to varices, hepatic encephalopathy, bleeding tendencies and liver cancer. The patient was told of the importance of follow-up. The patient was advised to follow up every 6 months for blood work and imaging studies. The patient agreed and will follow up. The patient was advised to lose weight. I recommend a trial of vitamin E supplementation for the fatty liver. If the liver enzymes remain elevated, the patient may require a trial of Pioglitazone for the fatty liver. I recommend avoid alcohol and hepato-toxic agents. The patient was also advised to avoid NSAIDs, Acetaminophen and any other hepatotoxic drugs. The patient was also advised not to share needles, razors, scissors, nail clippers, etc.. The patient is to continue close follow-up in our office for blood work and exams. If the liver enzymes remain elevated, the patient may require a CT guided liver biopsy to assess the liver parenchyma for possible treatment. We had a long discussion regarding the risks and benefits of the procedure. The patient was told of the risks of bleeding, perforation, infections, emergency surgery and missing lesions. The patient agreed and will follow-up to reassess the symptoms. \par Pancreatic Cystic Lesion: The patient was recently diagnosed with a pancreatic cystic lesion on recent MRCP. The patient complained of prior back pain. She denies any jaundice, pruritus. The patient currently denies any abdominal pain. The patient denies any prior history of EtOH abuse. The MRCP performed on December 30, 2019 revealed s/p cholecystectomy with stable 9 mm dilated CBD. There are now 2 small distal CBD calculi measuring 4 mm. Also noted was a stable 1 cm maximal longitudinal dimension medial pancreatic head cyst. Also noted was hepatic steatosis. The patient is status post upper endoscopy and ERCP performed by Dr. Dennys Jefferson on January 21, 2021. The upper endoscopy with biopsy performed on January 21, 2021 revealed gastritis and duodenal bulb and duodenum with clean-based ulcers. The pathology revealed antral mucosa with chronic inactive gastritis and was negative for Helicobacter pylori. The ERCP performed on January 21, 2021 revealed choledocholithiasis. The patient is status post ERCP with biliary sphincterotomy and stone removal. The patient tolerated the procedure well. \par Family History of GI Malignancy: The patient admits to a family history of GI problems. The patient’s mother had a history of pancreatic cancer at age 65.\par Prior Endoscopic Procedures: The patient had a prior upper endoscopy and colonoscopy performed by another gastroenterologist. I will try to obtain the prior upper endoscopy and colonoscopy reports. The patient is to sign another record release to obtain those records.\par Follow-up: The patient is to follow-up in the office in 6 months to reassess the symptoms. The patient was told to call the office if any further problems. \par

## 2022-11-10 ENCOUNTER — NON-APPOINTMENT (OUTPATIENT)
Age: 87
End: 2022-11-10

## 2022-11-10 ENCOUNTER — APPOINTMENT (OUTPATIENT)
Dept: RHEUMATOLOGY | Facility: CLINIC | Age: 87
End: 2022-11-10

## 2022-11-10 VITALS
HEIGHT: 60 IN | BODY MASS INDEX: 27.68 KG/M2 | DIASTOLIC BLOOD PRESSURE: 79 MMHG | RESPIRATION RATE: 16 BRPM | WEIGHT: 141 LBS | HEART RATE: 73 BPM | OXYGEN SATURATION: 95 % | TEMPERATURE: 98.1 F | SYSTOLIC BLOOD PRESSURE: 148 MMHG

## 2022-11-10 DIAGNOSIS — M25.562 PAIN IN LEFT KNEE: ICD-10-CM

## 2022-11-10 DIAGNOSIS — R21 RASH AND OTHER NONSPECIFIC SKIN ERUPTION: ICD-10-CM

## 2022-11-10 LAB
AFP-TM SERPL-MCNC: 2.8 NG/ML
ALBUMIN SERPL ELPH-MCNC: 4.5 G/DL
ALP BLD-CCNC: 90 U/L
ALT SERPL-CCNC: 13 U/L
AMYLASE/CREAT SERPL: 101 U/L
ANION GAP SERPL CALC-SCNC: 15 MMOL/L
AST SERPL-CCNC: 20 U/L
BASOPHILS # BLD AUTO: 0.05 K/UL
BASOPHILS NFR BLD AUTO: 0.6 %
BILIRUB SERPL-MCNC: 0.4 MG/DL
BUN SERPL-MCNC: 14 MG/DL
CALCIUM SERPL-MCNC: 9.9 MG/DL
CANCER AG19-9 SERPL-ACNC: 37 U/ML
CEA SERPL-MCNC: 3.7 NG/ML
CHLORIDE SERPL-SCNC: 101 MMOL/L
CHOLEST SERPL-MCNC: 193 MG/DL
CO2 SERPL-SCNC: 24 MMOL/L
CREAT SERPL-MCNC: 0.81 MG/DL
EGFR: 70 ML/MIN/1.73M2
EOSINOPHIL # BLD AUTO: 0.1 K/UL
EOSINOPHIL NFR BLD AUTO: 1.2 %
ERYTHROCYTE [SEDIMENTATION RATE] IN BLOOD BY WESTERGREN METHOD: 27 MM/HR
FERRITIN SERPL-MCNC: 64 NG/ML
GGT SERPL-CCNC: 13 U/L
GLUCOSE SERPL-MCNC: 94 MG/DL
HCT VFR BLD CALC: 38.1 %
HDLC SERPL-MCNC: 83 MG/DL
HGB BLD-MCNC: 12.2 G/DL
IMM GRANULOCYTES NFR BLD AUTO: 0.5 %
IRON SATN MFR SERPL: 19 %
IRON SERPL-MCNC: 62 UG/DL
LDLC SERPL CALC-MCNC: 83 MG/DL
LPL SERPL-CCNC: 56 U/L
LYMPHOCYTES # BLD AUTO: 3.32 K/UL
LYMPHOCYTES NFR BLD AUTO: 39.8 %
MAN DIFF?: NORMAL
MCHC RBC-ENTMCNC: 30.3 PG
MCHC RBC-ENTMCNC: 32 GM/DL
MCV RBC AUTO: 94.8 FL
MONOCYTES # BLD AUTO: 0.85 K/UL
MONOCYTES NFR BLD AUTO: 10.2 %
NEUTROPHILS # BLD AUTO: 3.99 K/UL
NEUTROPHILS NFR BLD AUTO: 47.7 %
NONHDLC SERPL-MCNC: 110 MG/DL
PLATELET # BLD AUTO: 323 K/UL
POTASSIUM SERPL-SCNC: 3.7 MMOL/L
PROT SERPL-MCNC: 7.2 G/DL
RBC # BLD: 4.02 M/UL
RBC # FLD: 13.2 %
RHEUMATOID FACT SER QL: 13 IU/ML
SODIUM SERPL-SCNC: 140 MMOL/L
TIBC SERPL-MCNC: 336 UG/DL
TRIGL SERPL-MCNC: 135 MG/DL
UIBC SERPL-MCNC: 274 UG/DL
WBC # FLD AUTO: 8.35 K/UL

## 2022-11-10 PROCEDURE — 99213 OFFICE O/P EST LOW 20 MIN: CPT | Mod: 25

## 2022-11-10 PROCEDURE — 20610 DRAIN/INJ JOINT/BURSA W/O US: CPT | Mod: LT

## 2022-11-10 RX ORDER — HYDROCORTISONE 25 MG/G
2.5 CREAM TOPICAL
Qty: 1 | Refills: 1 | Status: ACTIVE | COMMUNITY
Start: 2022-11-10 | End: 1900-01-01

## 2022-11-10 RX ORDER — DICLOFENAC SODIUM 1% 10 MG/G
1 GEL TOPICAL
Qty: 100 | Refills: 3 | Status: ACTIVE | OUTPATIENT
Start: 2021-10-12

## 2022-11-10 RX ORDER — LIDOCAINE HYDROCHLORIDE 10 MG/ML
1 INJECTION, SOLUTION INFILTRATION; PERINEURAL
Qty: 0 | Refills: 0 | Status: COMPLETED | OUTPATIENT
Start: 2022-11-10

## 2022-11-10 RX ORDER — METHYLPRED ACET/NACL,ISO-OS/PF 80 MG/ML
80 VIAL (ML) INJECTION
Qty: 1 | Refills: 0 | Status: COMPLETED | OUTPATIENT
Start: 2022-11-10

## 2022-11-10 RX ADMIN — METHYLPREDNISOLONE ACETATE 0 MG/ML: 80 INJECTION, SUSPENSION INTRA-ARTICULAR; INTRALESIONAL; INTRAMUSCULAR; SOFT TISSUE at 00:00

## 2022-11-10 RX ADMIN — LIDOCAINE HYDROCHLORIDE 0 %: 10 INJECTION, SOLUTION EPIDURAL; INFILTRATION; INTRACAUDAL; PERINEURAL at 00:00

## 2022-11-11 ENCOUNTER — NON-APPOINTMENT (OUTPATIENT)
Age: 87
End: 2022-11-11

## 2022-11-11 NOTE — PHYSICAL EXAM
[General Appearance - Alert] : alert [General Appearance - In No Acute Distress] : in no acute distress [Sclera] : the sclera and conjunctiva were normal [Neck Appearance] : the appearance of the neck was normal [] : no respiratory distress [Auscultation Breath Sounds / Voice Sounds] : lungs were clear to auscultation bilaterally [Edema] : there was no peripheral edema [Musculoskeletal - Swelling] : no joint swelling seen [Motor Tone] : muscle strength and tone were normal [Motor Exam] : the motor exam was normal [Oriented To Time, Place, And Person] : oriented to person, place, and time [Impaired Insight] : insight and judgment were intact [FreeTextEntry1] : Erythematous macule over the center portion of the hairline

## 2022-11-11 NOTE — HISTORY OF PRESENT ILLNESS
[FreeTextEntry1] : Patient returns for follow up and explains worsening pain over the left knee.  She reports pain inferior to the kneecap with accompanied swelling which limits her ability to go up and down the stairs ; she is unable to walk r more than a few blocks which she can normally do independently.  Last PT was earlier in the month.  She has been wearing compression socks daily and has noted considerable improvement in LE swelling.   Patient with  bone density of the L-spine reflecting T score of -2.7 and at the hip of -2.0;  last Prolia injection in October 2021 without complication;  the patient denies recent falls or fractures. \par She notes increased pruritus and has been trialed with antihistamines.  The most discomforting is the rash over the scalp line.\par  \par She otherwise denies joint swelling or systemic symptoms.

## 2022-11-11 NOTE — ASSESSMENT
[FreeTextEntry1] : Patient with lumbar and knee DJD; OP; pes anserine bursitis:\par \par Patient will benefit from continued physical therapy to help with joint mobility and muscle strengthening.  Quadriceps strengthening exercises demonstrated in the office.  Weight loss has been encouraged to reduce load over the medial joint line.  Cortisone injection given to the LT pes anserine bursa for pain relief.  Viscosupplementation has been encouraged to provide additional lubrication and joint support.  In the interim, Diclofenac gel rec.\par Core strengthening exercises recommended for improved lumbar mobility. She will continue on Calcium and VitD at the recommended doses of 1200mg and 800IU daily, respectively, whether through foods or supplements.  We reviewed calcium and VitD enriched foods as well.  Food plan discussed for improved weight loss efforts.  \par Patient due for Denosumab dosing for OP; authorization requested.\par Leg elevation and compression socks continued. \par Topical CS therapy given for facial rash\par \par She is in agreement with the above plan and will return in two months' time.\par \par \par

## 2022-11-21 ENCOUNTER — APPOINTMENT (OUTPATIENT)
Dept: CARDIOLOGY | Facility: CLINIC | Age: 87
End: 2022-11-21

## 2022-11-21 ENCOUNTER — NON-APPOINTMENT (OUTPATIENT)
Age: 87
End: 2022-11-21

## 2022-11-21 VITALS
TEMPERATURE: 98.7 F | SYSTOLIC BLOOD PRESSURE: 165 MMHG | DIASTOLIC BLOOD PRESSURE: 82 MMHG | BODY MASS INDEX: 27.68 KG/M2 | HEIGHT: 60 IN | HEART RATE: 82 BPM | WEIGHT: 141 LBS | OXYGEN SATURATION: 99 %

## 2022-11-21 PROCEDURE — 93000 ELECTROCARDIOGRAM COMPLETE: CPT

## 2022-11-21 PROCEDURE — 99214 OFFICE O/P EST MOD 30 MIN: CPT

## 2022-11-26 NOTE — ED ADULT NURSE NOTE - SUICIDE SCREENING QUESTION 1
SUBJECTIVE  Faby Amos is a 58 year old female with hx of NIDDM, Morbid obesity, Hypertension, GERD, Hyperlipidemia, MARIAH, morbid obesity, and urinary incontinence presents with generalized weakness and concerns for UTI as she has hx due to frequency.    OBJECTIVE  Patient still complaining of constant pain 9/10, worse with movement, afebrile, on 4L NC        I/O's    Intake/Output Summary (Last 24 hours) at 11/26/2022 1550  Last data filed at 11/26/2022 1000  Gross per 24 hour   Intake 1057.91 ml   Output 2000 ml   Net -942.09 ml       Last Recorded Vitals  Temp:  [98.2 °F (36.8 °C)-98.8 °F (37.1 °C)] 98.6 °F (37 °C)  Heart Rate:  [73-80] 73  Resp:  [16-18] 18  BP: (101-132)/(59-84) 132/84   Body mass index is 66.9 kg/m².    Review of Systems   Review of Systems   Constitutional: Negative for activity change, chills, diaphoresis, fatigue and fever.   HENT: Negative for congestion, sinus pressure, sinus pain, sneezing, sore throat, tinnitus and voice change.    Respiratory: Negative for cough, choking, chest tightness, shortness of breath, wheezing and stridor.    Cardiovascular: Negative for chest pain, palpitations and leg swelling.   Gastrointestinal: Negative for abdominal distention, abdominal pain, blood in stool, constipation, diarrhea, nausea and vomiting.   Genitourinary: Positive for dysuria and frequency. Negative for difficulty urinating, flank pain, hematuria and urgency.   Musculoskeletal: +left leg pain, redness Negative for arthralgias, back pain, gait problem, joint swelling, , neck pain and neck stiffness.   Skin: Negative for color change, pallor, and wound. Redness on left leg  Neurological: Negative for dizziness, syncope, weakness, light-headedness and headaches.   Psychiatric/Behavioral: Negative for agitation, behavioral problems and confusion.     Physical Exam  Physical Exam  Vitals and nursing note reviewed.   Constitutional:       Appearance: Normal appearance.  She is obese. She is not ill-appearing.   HENT:      Head: Normocephalic and atraumatic.      Nose: Nose normal. No congestion or rhinorrhea.      Mouth/Throat:      Mouth: Mucous membranes are moist.      Pharynx: No oropharyngeal exudate or posterior oropharyngeal erythema.      Neck: Normal range of motion and neck supple. No rigidity or tenderness.   Eyes:      General: No scleral icterus.     Extraocular Movements: Extraocular movements intact.      Pupils: Pupils are equal, round, and reactive to light.   Cardiovascular:      Rate and Rhythm: Normal rate and regular rhythm.      Pulses: Normal pulses.      Heart sounds: Normal heart sounds. No murmur heard.    No friction rub.   Pulmonary:      Effort: Pulmonary effort is normal. No respiratory distress.      Breath sounds: Normal breath sounds. No stridor. No wheezing or rhonchi.   Abdominal:      General: Abdomen is flat. Bowel sounds are normal. There is no distension.      Palpations: There is no mass.      Tenderness: There is no abdominal tenderness.      Hernia: No hernia is present.   Musculoskeletal:         General: No swelling, tenderness, deformity or signs of injury. Normal range of motion.   Skin:     General: Skin is warm.      Capillary Refill: Capillary refill takes less than 2 seconds.      Coloration: Skin red on left leg     Findings: No bruising    Neurological:      General: No focal deficit present.      Mental Status: She is alert. Mental status is at baseline.      Cranial Nerves: No cranial nerve deficit.   Psychiatric:         Mood and Affect: Mood normal.   Labs     Recent Results (from the past 24 hour(s))   GLUCOSE, BEDSIDE - POINT OF CARE    Collection Time: 11/25/22  5:34 PM   Result Value Ref Range    GLUCOSE, BEDSIDE - POINT OF CARE 103 (H) 70 - 99 mg/dL   GLUCOSE, BEDSIDE - POINT OF CARE    Collection Time: 11/25/22  9:13 PM   Result Value Ref Range    GLUCOSE, BEDSIDE - POINT OF CARE 137 (H) 70 - 99 mg/dL   Comprehensive  Metabolic Panel    Collection Time: 11/26/22  4:09 AM   Result Value Ref Range    Fasting Status      Sodium 132 (L) 135 - 145 mmol/L    Potassium 4.8 3.4 - 5.1 mmol/L    Chloride 101 97 - 110 mmol/L    Carbon Dioxide 26 21 - 32 mmol/L    Anion Gap 10 7 - 19 mmol/L    Glucose 114 (H) 70 - 99 mg/dL    BUN 35 (H) 6 - 20 mg/dL    Creatinine 0.96 (H) 0.51 - 0.95 mg/dL    Glomerular Filtration Rate 69 >=60    BUN/ Creatinine Ratio 36 (H) 7 - 25    Calcium 9.0 8.4 - 10.2 mg/dL    Bilirubin, Total 0.3 0.2 - 1.0 mg/dL    GOT/AST 23 <=37 Units/L    GPT/ALT 24 <64 Units/L    Alkaline Phosphatase 78 45 - 117 Units/L    Albumin 1.6 (L) 3.6 - 5.1 g/dL    Protein, Total 6.7 6.4 - 8.2 g/dL    Globulin 5.1 (H) 2.0 - 4.0 g/dL    A/G Ratio 0.3 (L) 1.0 - 2.4   Magnesium    Collection Time: 11/26/22  4:09 AM   Result Value Ref Range    Magnesium 2.3 1.7 - 2.4 mg/dL   Procalcitonin    Collection Time: 11/26/22  4:09 AM   Result Value Ref Range    Procalcitonin 2.57 (H) <=0.09 ng/mL   NT proBNP    Collection Time: 11/26/22  4:09 AM   Result Value Ref Range    NT-proBNP 932 (H) <=125 pg/mL   CBC with Automated Differential (performable only)    Collection Time: 11/26/22  4:09 AM   Result Value Ref Range    WBC 12.7 (H) 4.2 - 11.0 K/mcL    RBC 3.48 (L) 4.00 - 5.20 mil/mcL    HGB 9.0 (L) 12.0 - 15.5 g/dL    HCT 29.2 (L) 36.0 - 46.5 %    MCV 83.9 78.0 - 100.0 fl    MCH 25.9 (L) 26.0 - 34.0 pg    MCHC 30.8 (L) 32.0 - 36.5 g/dL    RDW-CV 17.0 (H) 11.0 - 15.0 %    RDW-SD 51.7 (H) 39.0 - 50.0 fL     140 - 450 K/mcL    NRBC 0 <=0 /100 WBC    Neutrophil, Percent 66 %    Lymphocytes, Percent 11 %    Mono, Percent 11 %    Eosinophils, Percent 4 %    Basophils, Percent 1 %    Immature Granulocytes 7 %    Absolute Neutrophils 8.5 (H) 1.8 - 7.7 K/mcL    Absolute Lymphocytes 1.3 1.0 - 4.0 K/mcL    Absolute Monocytes 1.4 (H) 0.3 - 0.9 K/mcL    Absolute Eosinophils  0.5 0.0 - 0.5 K/mcL    Absolute Basophils 0.1 0.0 - 0.3 K/mcL    Absolute Immmature  Granulocytes 0.8 (H) 0.0 - 0.2 K/mcL   Manual Differential    Collection Time: 11/26/22  4:09 AM   Result Value Ref Range    WBC Morphology Normal Normal    Macrocytosis Few     Microcytosis Few     Agranular Platelets Present     Large Platelets Present    GLUCOSE, BEDSIDE - POINT OF CARE    Collection Time: 11/26/22  7:54 AM   Result Value Ref Range    GLUCOSE, BEDSIDE - POINT OF CARE 122 (H) 70 - 99 mg/dL   GLUCOSE, BEDSIDE - POINT OF CARE    Collection Time: 11/26/22 11:31 AM   Result Value Ref Range    GLUCOSE, BEDSIDE - POINT OF CARE 117 (H) 70 - 99 mg/dL       Imaging    No results found.        Assessment and Plan   59 y/o F with hx of NIDDM, Morbid obesity, Hypertension, GERD, Hyperlipidemia, MARIAH, morbid obesity, and urinary incontinence presents with generalized weakness and concerns for UTI presents with hyponatremia, UTI and hyperkalemia        UTI with sepsis   -UA reviewed   -s/p ceftriaxone - cont. Cefepime  -Urine culture pending  -PCT 42.5, WBC 23.5, HR 95 and hypotensive on admission--->PCT 19.95 today, WBC19--->15.4---> 14.6--->12.7     Cellulitis of left lower extremity  -XR showing no signs of osteo  -Cont. Cefepime  -Venous doppler  -unsure if this is infectious or due to venous stasis - CT lower ext. Ordered - diffuse edema noted  -change pain medications to oxycodone PRN, gabapentin and scheduled acetaminophen with dilaudid PRN      EDNA with Hyperkalemia   -Cr. On admission 3.76--->3--->1.18--->0.96  -Renal u/s pending with urine lytes  -Consult nephrology  -K 6.2 on admission - calcium gluconate given along with dextrose and insulin - repeat 5.5 - resolved  -Lokelma given   -Possible ATN from hypotension and nephrotoxins  -hold ACE/ARB and diuretics    HFpEF Exacerbation  -ECHO pending  -BNP elevated  -CXR shows pulmonary edema  -stop fluids  -Lasix BID IV started     Hyponatremia  -129 on admission - 132--->134--->132  -cont. To monitor  -Nephrology continued     NIDDM  -hold  metformin  -mild SSI with AC/HS accuchecks     Hypertension   -cont. Atenolol - decrease dose   -hold other antihypertensives due to lower pressures     GERD  -cont. PPI    Morbid obesity and MARIAH  -discussed healthy lifestyle choices  -cont. CPAP at bedtime     Hyperlipdemia   -cont. Statin    Functional weakness  -Consult PT - needs SNF  -consult OT     DVT prophylaxis   -SCDs        Smoking Cessation  Counseling given: Not Answered             Carlitos Barton MD  Hospitalist  [facility]  24 Hr Hospitalist Service with Same Physician Continuity of Care  [phone] [place] 24hr Answering Service  Cell: [phone]         No

## 2022-11-30 NOTE — H&P ADULT - PROBLEM SELECTOR PLAN 6
Safe sheath inserted over the wire and split. IMPROVE VTE Individual Risk Assessment          RISK                                                          Points  [  ] Previous VTE                                                3  [  ] Thrombophilia                                             2  [  ] Lower limb paralysis                                   2        (unable to hold up >15 seconds)    [  ] Current Cancer                                             2         (within 6 months)  [  ] Immobilization > 24 hrs                              1  [  ] ICU/CCU stay > 24 hours                             1  [  ] Age > 60                                                         1    IMPROVE VTE Score: 2  will start on lovenox

## 2022-12-15 ENCOUNTER — NON-APPOINTMENT (OUTPATIENT)
Age: 87
End: 2022-12-15

## 2022-12-23 RX ORDER — ASPIRIN 81 MG/1
81 TABLET, CHEWABLE ORAL
Qty: 90 | Refills: 3 | Status: ACTIVE | COMMUNITY
Start: 2021-04-12 | End: 1900-01-01

## 2023-01-11 ENCOUNTER — APPOINTMENT (OUTPATIENT)
Dept: GASTROENTEROLOGY | Facility: CLINIC | Age: 88
End: 2023-01-11

## 2023-01-12 ENCOUNTER — RX RENEWAL (OUTPATIENT)
Age: 88
End: 2023-01-12

## 2023-01-12 RX ORDER — MELOXICAM 7.5 MG/1
7.5 TABLET ORAL
Qty: 30 | Refills: 1 | Status: ACTIVE | COMMUNITY
Start: 2022-11-10 | End: 1900-01-01

## 2023-02-28 ENCOUNTER — APPOINTMENT (OUTPATIENT)
Dept: NEUROLOGY | Facility: CLINIC | Age: 88
End: 2023-02-28
Payer: MEDICAID

## 2023-02-28 VITALS
SYSTOLIC BLOOD PRESSURE: 136 MMHG | DIASTOLIC BLOOD PRESSURE: 79 MMHG | BODY MASS INDEX: 26.7 KG/M2 | WEIGHT: 136 LBS | HEIGHT: 60 IN | HEART RATE: 104 BPM | TEMPERATURE: 98.2 F | OXYGEN SATURATION: 99 %

## 2023-02-28 PROCEDURE — 99213 OFFICE O/P EST LOW 20 MIN: CPT

## 2023-02-28 RX ORDER — DONEPEZIL HYDROCHLORIDE 5 MG/1
5 TABLET ORAL
Qty: 30 | Refills: 0 | Status: ACTIVE | COMMUNITY
Start: 2023-02-28 | End: 1900-01-01

## 2023-03-02 NOTE — DATA REVIEWED
[de-identified] : EXAM: [id number] - MR BRAIN - ORDERED BY: KYLAH COLÓN   PROCEDURE DATE: 09/07/2022    INTERPRETATION: EXAM: [id number] - MR BRAIN - ORDERED BY: JAYSON KARIMI   PROCEDURE DATE: 09/07/2022    INTERPRETATION: EXAM: MRI brain without contrast  INDICATION: Non-specific neurological symptoms. Memory loss  TECHNIQUE: MR examination of brain performed without contrast utilizing axial diffusion-weighted/ADC, axial T2 FLAIR, sagittal T2 FLAIR, coronal T2 FLAIR, axial T1, sagittal T1, coronal T1, axial susceptibility weighted sequences.  COMPARISON: October 17, 2019 MRI brain  FINDINGS: Ventricles and sulci are mildly proportionately prominent likely related to mild parenchymal volume loss. . No hydrocephalus or extra-axial fluid collection.  No abnormal restricted diffusion identified to suggest acute territorial infarction. No acute intracranial hemorrhage. Mild subcortical and periventricular-white matter T2-weighted hyperintensity, nonspecific but favored to reflect sequela of mild chronic microvascular ischemia. . No significant midline shift, mass effect or herniation. Susceptibility weighted sequences are within normal limits without evidence for chronic blood products.  Bilateral lens replacement.  Visualized proximal arterial and dural venous sinus flow voids preserved on spin-echo sequences. Mild opacification of right mastoid air cells is present. Paranasal sinuses and left mastoid air cells are well aerated.  No suspicious expansile or destructive calvarial lesion identified.  Sella and suprasellar region are unremarkable.   IMPRESSION:  No acute intracranial hemorrhage, acute infarction, extra-axial fluid collection or hydrocephalus.  If clinicians have any questions/need for clarification regarding this report, Dr. Abdullahi Dominguez can be best reached via the patient secure hospital email system    --- End of Report ---

## 2023-03-02 NOTE — PHYSICAL EXAM
[___ / 5] : Visuospatial / Executive: [unfilled] / 5 [0 / 0] : Memory: 0 / 0 [___ / 3] : Attention (Serial 7 subtraction): [unfilled] / 3 [___ / 1] : Fluency: [unfilled] / 1 [___ / 2] : Abstraction: [unfilled] / 2 [___ / 5] : Delayed Recall: [unfilled] / 5 [___ / 6] : Orientation: [unfilled] / 6 [[employer]oca[employer]] : 13

## 2023-07-10 ENCOUNTER — APPOINTMENT (OUTPATIENT)
Dept: CARDIOLOGY | Facility: CLINIC | Age: 88
End: 2023-07-10

## 2023-07-26 NOTE — PROGRESS NOTE ADULT - PROBLEM SELECTOR PROBLEM 5
HTN (hypertension)
O-L Flap Text: Due to geometric and functional constraints, a flap reconstruction was performed to reconstruct the defect. To that end, adjacent tissue was incised and carried over to close the defect in the following manner: The defect edges were debeveled with a #15 scalpel blade.  Given the location of the defect, shape of the defect and the proximity to free margins an O-L flap was deemed most appropriate.  Using a sterile surgical marker, an appropriate advancement flap was drawn incorporating the defect and placing the expected incisions within the relaxed skin tension lines where possible.    The area thus outlined was incised deep to adipose tissue with a #15 scalpel blade.  The skin margins were undermined to an appropriate distance in all directions utilizing iris scissors.

## 2023-08-16 ENCOUNTER — OUTPATIENT (OUTPATIENT)
Dept: OUTPATIENT SERVICES | Facility: HOSPITAL | Age: 88
LOS: 1 days | End: 2023-08-16
Payer: MEDICAID

## 2023-08-16 DIAGNOSIS — Z96.659 PRESENCE OF UNSPECIFIED ARTIFICIAL KNEE JOINT: Chronic | ICD-10-CM

## 2023-08-16 DIAGNOSIS — Z90.49 ACQUIRED ABSENCE OF OTHER SPECIFIED PARTS OF DIGESTIVE TRACT: Chronic | ICD-10-CM

## 2023-08-16 DIAGNOSIS — R01.1 CARDIAC MURMUR, UNSPECIFIED: ICD-10-CM

## 2023-08-16 DIAGNOSIS — Z98.890 OTHER SPECIFIED POSTPROCEDURAL STATES: Chronic | ICD-10-CM

## 2023-08-16 PROCEDURE — 93306 TTE W/DOPPLER COMPLETE: CPT | Mod: 26

## 2023-08-16 PROCEDURE — 93306 TTE W/DOPPLER COMPLETE: CPT

## 2023-09-25 ENCOUNTER — NON-APPOINTMENT (OUTPATIENT)
Age: 88
End: 2023-09-25

## 2023-09-25 ENCOUNTER — APPOINTMENT (OUTPATIENT)
Dept: CARDIOLOGY | Facility: CLINIC | Age: 88
End: 2023-09-25
Payer: MEDICAID

## 2023-09-25 VITALS
WEIGHT: 134 LBS | SYSTOLIC BLOOD PRESSURE: 153 MMHG | TEMPERATURE: 98.6 F | HEIGHT: 60 IN | DIASTOLIC BLOOD PRESSURE: 86 MMHG | HEART RATE: 80 BPM | OXYGEN SATURATION: 98 % | BODY MASS INDEX: 26.31 KG/M2

## 2023-09-25 PROCEDURE — 99214 OFFICE O/P EST MOD 30 MIN: CPT

## 2023-09-25 PROCEDURE — 93000 ELECTROCARDIOGRAM COMPLETE: CPT

## 2023-09-26 ENCOUNTER — APPOINTMENT (OUTPATIENT)
Dept: RHEUMATOLOGY | Facility: CLINIC | Age: 88
End: 2023-09-26
Payer: MEDICAID

## 2023-09-26 VITALS
DIASTOLIC BLOOD PRESSURE: 90 MMHG | SYSTOLIC BLOOD PRESSURE: 153 MMHG | HEART RATE: 83 BPM | BODY MASS INDEX: 26.11 KG/M2 | RESPIRATION RATE: 16 BRPM | WEIGHT: 133 LBS | TEMPERATURE: 98 F | HEIGHT: 60 IN | OXYGEN SATURATION: 97 %

## 2023-09-26 VITALS — SYSTOLIC BLOOD PRESSURE: 160 MMHG | DIASTOLIC BLOOD PRESSURE: 89 MMHG

## 2023-09-26 PROCEDURE — 99214 OFFICE O/P EST MOD 30 MIN: CPT | Mod: 25

## 2023-09-26 RX ORDER — LIDOCAINE HYDROCHLORIDE 10 MG/ML
1 INJECTION, SOLUTION INFILTRATION; PERINEURAL
Qty: 0 | Refills: 0 | Status: COMPLETED | OUTPATIENT
Start: 2023-09-26

## 2023-09-26 RX ADMIN — METHYLPREDNISOLONE ACETATE 0 MG/ML: 80 INJECTION, SUSPENSION INTRA-ARTICULAR; INTRALESIONAL; INTRAMUSCULAR; SOFT TISSUE at 00:00

## 2023-09-26 RX ADMIN — LIDOCAINE HYDROCHLORIDE 0 %: 10 INJECTION, SOLUTION EPIDURAL; INFILTRATION; INTRACAUDAL; PERINEURAL at 00:00

## 2023-09-27 RX ORDER — DENOSUMAB 60 MG/ML
60 INJECTION SUBCUTANEOUS
Qty: 1 | Refills: 3 | Status: ACTIVE | COMMUNITY
Start: 2021-09-28

## 2023-09-27 RX ORDER — DENOSUMAB 60 MG/ML
60 INJECTION SUBCUTANEOUS
Qty: 1 | Refills: 0 | Status: ACTIVE | COMMUNITY
Start: 2022-04-12 | End: 1900-01-01

## 2023-11-14 ENCOUNTER — RX RENEWAL (OUTPATIENT)
Age: 88
End: 2023-11-14

## 2023-11-14 RX ORDER — ASPIRIN 81 MG/1
81 TABLET, CHEWABLE ORAL
Qty: 90 | Refills: 3 | Status: ACTIVE | COMMUNITY
Start: 2023-11-14 | End: 1900-01-01

## 2023-12-29 ENCOUNTER — APPOINTMENT (OUTPATIENT)
Dept: NEUROLOGY | Facility: CLINIC | Age: 88
End: 2023-12-29

## 2024-01-02 ENCOUNTER — APPOINTMENT (OUTPATIENT)
Dept: RHEUMATOLOGY | Facility: CLINIC | Age: 89
End: 2024-01-02
Payer: MEDICAID

## 2024-01-02 VITALS
BODY MASS INDEX: 25.52 KG/M2 | DIASTOLIC BLOOD PRESSURE: 71 MMHG | HEART RATE: 84 BPM | OXYGEN SATURATION: 98 % | TEMPERATURE: 98.1 F | WEIGHT: 130 LBS | HEIGHT: 60 IN | RESPIRATION RATE: 16 BRPM | SYSTOLIC BLOOD PRESSURE: 146 MMHG

## 2024-01-02 PROCEDURE — 20610 DRAIN/INJ JOINT/BURSA W/O US: CPT | Mod: 50

## 2024-01-02 PROCEDURE — 99214 OFFICE O/P EST MOD 30 MIN: CPT | Mod: 25

## 2024-01-02 RX ORDER — LIDOCAINE HYDROCHLORIDE 10 MG/ML
1 INJECTION, SOLUTION INFILTRATION; PERINEURAL
Qty: 0 | Refills: 0 | Status: COMPLETED | OUTPATIENT
Start: 2024-01-02

## 2024-01-02 RX ORDER — METHYLPRED ACET/NACL,ISO-OS/PF 80 MG/ML
80 VIAL (ML) INJECTION
Qty: 1 | Refills: 0 | Status: COMPLETED | OUTPATIENT
Start: 2024-01-02

## 2024-01-02 RX ADMIN — LIDOCAINE HYDROCHLORIDE 0 %: 10 INJECTION, SOLUTION EPIDURAL; INFILTRATION; INTRACAUDAL; PERINEURAL at 00:00

## 2024-01-02 RX ADMIN — METHYLPREDNISOLONE ACETATE 0 MG/ML: 80 INJECTION, SUSPENSION INTRA-ARTICULAR; INTRALESIONAL; INTRAMUSCULAR; SOFT TISSUE at 00:00

## 2024-01-02 NOTE — ASSESSMENT
[FreeTextEntry1] : Patient with lumbar and knee DJD; OP; b/l pes anserine bursitis:  Patient will benefit from continued physical therapy to help with joint mobility and muscle strengthening. Quadriceps strengthening exercises demonstrated in the office. Weight loss has been encouraged to reduce load over the medial joint line. Cortisone injection given to the pes anserine bursa b/l for pain relief. Patient to continue with knee sleeve as well as compression socks. Viscosupplementation has been encouraged to provide additional lubrication and joint support. Leg elevation continued. In the interim, Diclofenac gel rec. Core strengthening exercises recommended for improved lumbar mobility. She will continue on Calcium and VitD at the recommended doses of 1200mg and 800IU daily, respectively, whether through foods or supplements. We reviewed calcium and VitD enriched foods as well. Food plan discussed for improved weight loss efforts. Patient due for Denosumab dosing for OP; authorization requested. She is in agreement with the above plan and will return in three months' time.

## 2024-01-02 NOTE — HISTORY OF PRESENT ILLNESS
[FreeTextEntry1] : Patient returns after hiatus explaining worsening pain over the knees bilaterally.  She reports pain inferior to the kneecap with accompanied swelling which limits her ability to go up and down the stairs ; she is unable to walk  more than a few blocks which she can normally do independently.  She has been wearing compression socks daily and has noted considerable improvement in LE swelling.   Patient with  bone density of the L-spine reflecting T score of -2.7 and at the hip of -2.0;  last Prolia injection in Nov 2022 without complication;  the patient denies recent falls or fractures.  She notes increased pruritus and has been trialed with antihistamines.     She otherwise denies joint swelling or systemic symptoms.

## 2024-01-02 NOTE — PHYSICAL EXAM
[General Appearance - Alert] : alert [General Appearance - In No Acute Distress] : in no acute distress [Sclera] : the sclera and conjunctiva were normal [Neck Appearance] : the appearance of the neck was normal [Auscultation Breath Sounds / Voice Sounds] : lungs were clear to auscultation bilaterally [Edema] : there was no peripheral edema [Musculoskeletal - Swelling] : no joint swelling seen [Motor Tone] : muscle strength and tone were normal [] : no rash [Skin Lesions] : no skin lesions [Motor Exam] : the motor exam was normal [Oriented To Time, Place, And Person] : oriented to person, place, and time [Impaired Insight] : insight and judgment were intact [FreeTextEntry1] : antalgic gait; right midline scar over the patella intact without discharge; tender over  pes anserine bursa b/l; mild tenderness over medial joint lines bilaterally; subluxation of the second MP joint over the first on the left

## 2024-01-02 NOTE — PROCEDURE
[Today's Date:] : Date: [unfilled] [Patient] : the patient [Risks] : risks [Benefits] : benefits [Consent Obtained] : written consent was obtained prior to the procedure and is detailed in the patient's record [Therapeutic] : therapeutic [#1 Site: ______] : #1 site identified in the [unfilled] [#2 Site: ___] : # 2 site identified in the [unfilled] [Ethyl Chloride] : ethyl chloride [Betadine] : with betadine solution [Alcohol] : alcohol [25 gauge 1.5 inch] : A 25 gauge 1.5 inch needle was used [___ml 1% Lidocaine] : [unfilled] ml of 1% lidocaine [Depomedrol ___ mg] : Depomedrol [unfilled] mg [Tolerated Well] : the patient tolerated the procedure well [No Complications] : there were no complications [Patient Instructed to Call] : patient was instructed to call if redness at site, a decrease in range of motion or an increase in pain is noted after procedure.

## 2024-04-14 PROBLEM — M31.6 TEMPORAL GIANT CELL ARTERITIS: Status: ACTIVE | Noted: 2020-01-14

## 2024-04-14 PROBLEM — Z13.6 SCREENING FOR CARDIOVASCULAR CONDITION: Status: ACTIVE | Noted: 2021-04-18

## 2024-04-14 PROBLEM — R01.1 CARDIAC MURMUR: Status: ACTIVE | Noted: 2022-11-21

## 2024-04-14 PROBLEM — I10 BENIGN HYPERTENSION: Status: ACTIVE | Noted: 2021-04-12

## 2024-04-14 PROBLEM — E78.00 ELEVATED CHOLESTEROL: Status: ACTIVE | Noted: 2019-09-26

## 2024-04-15 ENCOUNTER — APPOINTMENT (OUTPATIENT)
Dept: CARDIOLOGY | Facility: CLINIC | Age: 89
End: 2024-04-15
Payer: MEDICAID

## 2024-04-15 ENCOUNTER — NON-APPOINTMENT (OUTPATIENT)
Age: 89
End: 2024-04-15

## 2024-04-15 VITALS — DIASTOLIC BLOOD PRESSURE: 88 MMHG | SYSTOLIC BLOOD PRESSURE: 167 MMHG

## 2024-04-15 VITALS
BODY MASS INDEX: 25.98 KG/M2 | HEART RATE: 86 BPM | TEMPERATURE: 98.3 F | SYSTOLIC BLOOD PRESSURE: 190 MMHG | OXYGEN SATURATION: 98 % | DIASTOLIC BLOOD PRESSURE: 79 MMHG | WEIGHT: 133 LBS

## 2024-04-15 DIAGNOSIS — E78.00 PURE HYPERCHOLESTEROLEMIA, UNSPECIFIED: ICD-10-CM

## 2024-04-15 DIAGNOSIS — M31.6 OTHER GIANT CELL ARTERITIS: ICD-10-CM

## 2024-04-15 DIAGNOSIS — Z13.6 ENCOUNTER FOR SCREENING FOR CARDIOVASCULAR DISORDERS: ICD-10-CM

## 2024-04-15 DIAGNOSIS — I10 ESSENTIAL (PRIMARY) HYPERTENSION: ICD-10-CM

## 2024-04-15 DIAGNOSIS — I05.0 RHEUMATIC MITRAL STENOSIS: ICD-10-CM

## 2024-04-15 DIAGNOSIS — R01.1 CARDIAC MURMUR, UNSPECIFIED: ICD-10-CM

## 2024-04-15 PROCEDURE — G2211 COMPLEX E/M VISIT ADD ON: CPT | Mod: NC,1L

## 2024-04-15 PROCEDURE — 93000 ELECTROCARDIOGRAM COMPLETE: CPT

## 2024-04-15 PROCEDURE — 99214 OFFICE O/P EST MOD 30 MIN: CPT | Mod: 25

## 2024-04-17 NOTE — ED ADULT NURSE NOTE - RN DISCHARGE SIGNATURE
Giovanni Nguyen MD [facility]  Interventional & Structural Cardiology                         [facility]  [place], [place]                                                                     Interventional & Structural Heart Care Note      Patient Name: Azucena Myrick - :1943 - MRN:[id number]  Primary Cardiologist: Dr. Palacio  Consulting Cardiologist: Dr. Nguyen  PCP: Bhavana Mendoza MD     Reason for encounter: Mitral regurgitation - mitral clip today/ASD closure     Date:2024    Assessment and Plan   Mitral regurgitation / ASD- Echocardiogram 2023, EF 60 to 65% now 40-45%, severe degenerative nonrheumatic mitral regurgitation eccentric jet . Moderate tricuspid regurgitation. Atrial septal defect with significant change from left to right, especially the mitral regurgitation jet. Discussed extensively with the heart team including CT surgery.  Patient wishes to proceed with her mitral clip today, diuresis with bumex        Post proceure today- Strict I&O, IS, wts   PPM/PAF/Afib, - hold lovenox this am, on digoxin, metoprolol  currently rate controlled, monitor lytes, resume eliquis when able   Hx PAH/HTN/idiopathic hypotension- now with some soft BPs, on midodrine  Acute on Chronic HFpEF- diuresis , home GDMT on hold due to hypotension, (losartan, metoprolol, farxiga)  BNP 2805, down 7.8 L   5.  S/p ascending aortic aneurysm repair , BP control  6.  ADOLFO in setting of diuretics, HFpEF, CTS holding bumex today d/t elevated creatine  7.  Hypoxia - on    8.  Hypercholesterolemia - on statin   9.   Arthritis /spinal stenosis - pain controlled per pt, tylenol  10. GERD -diet controlled, no home meds    ______________________________________    HPI:     80-year-old very pleasant woman with history of type a aortic dissection 2015 post emergent surgical intervention with residual arch and descending aorta dissection (dissection goes to right  cardiomegaly is noted. The patient is status post median  sternotomy. Pacer leads are unchanged in position. The pulmonary vasculature is  within normal limits.    Mild pulmonary edema is noted. There are trace bilateral pleural effusions. The  visualized bones and upper abdomen are age-appropriate.    Impression  Mild pulmonary edema and trace bilateral pleural effusions.        CT Result (most recent):  CTA ABDOMEN AND PELVIS W CONTRAST 05/22/2018    Narrative  EXAM:  CTA CHEST W OR W WO CONT, CTA ABDOMEN PELV W CONT    INDICATION:  Dyspnea on exertion. Aortic dissection.    COMPARISON: CT angiography chest 5/8/2017. CT angiography chest/abdomen/pelvis  3/21/2016.    TECHNIQUE: Helical thin section chest, abdomen, and pelvis CT following  uneventful intravenous administration of nonionic contrast according to  departmental PE protocol. Coronal and sagittal reformats were performed. 3D/MIP  post processing was performed. CT dose reduction was achieved through use of a  standardized protocol tailored for this examination and automatic exposure  control for dose modulation. Adaptive statistical iterative reconstruction  (ASIR) was utilized.    FINDINGS:  CTA chest:  Surgical changes are again seen following aortic root repair. There is a stable  thoracic and abdominal aortic aneurysm extending into the right subclavian and  right brachiocephalic arteries with dilatation of the aortic arch measuring 4.8  cm in diameter, unchanged.    The dissection extends into the abdomen and right common iliac artery,  unchanged. Abdominal aorta is normal in caliber.  SMA and celiac are patent  and are fed by the true lumen. Left renal artery is patent and fed by the  true lumen. Right renal artery is patent and fed by the false lumen. ISABEL is  fed by the false lumen.    There is stable dilatation of the main pulmonary artery measuring 4.1 cm in  diameter in keeping with pulmonary artery hypertension.    The visualized thyroid gland  13-Apr-2018

## 2024-04-24 ENCOUNTER — OUTPATIENT (OUTPATIENT)
Dept: OUTPATIENT SERVICES | Facility: HOSPITAL | Age: 89
LOS: 1 days | End: 2024-04-24
Payer: MEDICARE

## 2024-04-24 ENCOUNTER — RESULT REVIEW (OUTPATIENT)
Age: 89
End: 2024-04-24

## 2024-04-24 DIAGNOSIS — Z96.659 PRESENCE OF UNSPECIFIED ARTIFICIAL KNEE JOINT: Chronic | ICD-10-CM

## 2024-04-24 DIAGNOSIS — Z90.49 ACQUIRED ABSENCE OF OTHER SPECIFIED PARTS OF DIGESTIVE TRACT: Chronic | ICD-10-CM

## 2024-04-24 DIAGNOSIS — I05.0 RHEUMATIC MITRAL STENOSIS: ICD-10-CM

## 2024-04-24 DIAGNOSIS — Z98.890 OTHER SPECIFIED POSTPROCEDURAL STATES: Chronic | ICD-10-CM

## 2024-04-24 PROCEDURE — 93306 TTE W/DOPPLER COMPLETE: CPT

## 2024-04-24 PROCEDURE — 93306 TTE W/DOPPLER COMPLETE: CPT | Mod: 26

## 2024-04-24 RX ORDER — LOSARTAN POTASSIUM 100 MG/1
100 TABLET, FILM COATED ORAL DAILY
Qty: 90 | Refills: 3 | Status: ACTIVE | COMMUNITY
Start: 1900-01-01 | End: 1900-01-01

## 2024-04-24 RX ORDER — HYDROCHLOROTHIAZIDE 25 MG/1
25 TABLET ORAL DAILY
Qty: 90 | Refills: 3 | Status: ACTIVE | COMMUNITY
Start: 2021-04-12 | End: 1900-01-01

## 2024-04-24 RX ORDER — AMLODIPINE BESYLATE 5 MG/1
5 TABLET ORAL DAILY
Qty: 90 | Refills: 3 | Status: ACTIVE | COMMUNITY
Start: 2021-04-12 | End: 1900-01-01

## 2024-05-01 ENCOUNTER — APPOINTMENT (OUTPATIENT)
Dept: RHEUMATOLOGY | Facility: CLINIC | Age: 89
End: 2024-05-01
Payer: MEDICARE

## 2024-05-01 VITALS
OXYGEN SATURATION: 96 % | RESPIRATION RATE: 16 BRPM | BODY MASS INDEX: 26.39 KG/M2 | DIASTOLIC BLOOD PRESSURE: 79 MMHG | WEIGHT: 134.4 LBS | SYSTOLIC BLOOD PRESSURE: 178 MMHG | TEMPERATURE: 97.2 F | HEART RATE: 81 BPM | HEIGHT: 60 IN

## 2024-05-01 DIAGNOSIS — M17.12 UNILATERAL PRIMARY OSTEOARTHRITIS, LEFT KNEE: ICD-10-CM

## 2024-05-01 DIAGNOSIS — M17.0 BILATERAL PRIMARY OSTEOARTHRITIS OF KNEE: ICD-10-CM

## 2024-05-01 DIAGNOSIS — M81.0 AGE-RELATED OSTEOPOROSIS W/OUT CURRENT PATHOLOGICAL FRACTURE: ICD-10-CM

## 2024-05-01 DIAGNOSIS — M70.50 OTHER BURSITIS OF KNEE, UNSPECIFIED KNEE: ICD-10-CM

## 2024-05-01 DIAGNOSIS — M17.11 UNILATERAL PRIMARY OSTEOARTHRITIS, RIGHT KNEE: ICD-10-CM

## 2024-05-01 PROCEDURE — 20610 DRAIN/INJ JOINT/BURSA W/O US: CPT | Mod: RT

## 2024-05-01 PROCEDURE — 99213 OFFICE O/P EST LOW 20 MIN: CPT | Mod: 25

## 2024-05-01 RX ADMIN — LIDOCAINE HYDROCHLORIDE 0 %: 10 INJECTION, SOLUTION INFILTRATION; PERINEURAL at 00:00

## 2024-05-01 RX ADMIN — Medication 0 MG/ML: at 00:00

## 2024-05-01 NOTE — HISTORY OF PRESENT ILLNESS
[FreeTextEntry1] : Patient returns after hiatus explaining worsening pain over the knees bilaterally, left greater than RT; patient had RT knee replacement 10 years prior though finds pain worsening especially when descending stairs.  She reports pain inferior to the kneecap with accompanied swelling which limits her ability to go up and down the stairs ; she is unable to walk  more than a few blocks which she can normally do independently.  She has been wearing compression socks daily and has noted considerable improvement in LE swelling.   She finds the patient with  bone density of the L-spine reflecting T score of -2.7 and at the hip of -2.0;  last Prolia injection in Nov 2022 without complication;  the patient denies recent falls or fractures.  Initial systolic blood pressure reading of 193/79 upon manual repeat :160/80-she has not taken antihypertensive tensive therapy; she denies headaches, visual disturbances or chest pain. She otherwise denies joint swelling or systemic symptoms.

## 2024-05-01 NOTE — ASSESSMENT
[FreeTextEntry1] : Patient with lumbar and knee DJD; OP; b/l pes anserine bursitis: RT knee arthropathy   Patient will benefit from continued physical therapy to help with joint mobility and muscle strengthening. Quadriceps strengthening exercises demonstrated in the office. Weight loss has been encouraged to reduce load over the medial joint line. Cortisone injection given to the RT pes anserine bursa for pain relief. Patient to continue with knee sleeve as well as compression socks. Viscosupplementation has been encouraged to provide additional lubrication and joint support. Leg elevation continued. In the interim, Diclofenac gel rec. Core strengthening exercises recommended for improved lumbar mobility. She will continue on Calcium and VitD at the recommended doses of 1200mg and 800IU daily, respectively, whether through foods or supplements. We reviewed calcium and VitD enriched foods as well. Food plan discussed for improved weight loss efforts. Patient due for Denosumab dosing for OP; updated bone density requested. Discussed the importance of regular antihypertensive therapy use for optimal BP control and the importance of reducing cardiovascular risk including myocardial infarction and stroke, explained to the patient in detail.  She is in agreement with the above plan and will return in three months' time.

## 2024-05-01 NOTE — PHYSICAL EXAM
[General Appearance - Alert] : alert [General Appearance - In No Acute Distress] : in no acute distress [Sclera] : the sclera and conjunctiva were normal [Neck Appearance] : the appearance of the neck was normal [Auscultation Breath Sounds / Voice Sounds] : lungs were clear to auscultation bilaterally [Edema] : there was no peripheral edema [] : no rash [Skin Lesions] : no skin lesions [Motor Exam] : the motor exam was normal [Oriented To Time, Place, And Person] : oriented to person, place, and time [Impaired Insight] : insight and judgment were intact [Musculoskeletal - Swelling] : no joint swelling seen [Motor Tone] : muscle strength and tone were normal [FreeTextEntry1] : antalgic gait; right midline scar over the patella intact without discharge; tender over  pes anserine bursa b/l; mild tenderness over medial joint lines bilaterally; subluxation of the second MP joint over the first on the left

## 2024-05-19 ENCOUNTER — NON-APPOINTMENT (OUTPATIENT)
Age: 89
End: 2024-05-19

## 2024-05-20 ENCOUNTER — APPOINTMENT (OUTPATIENT)
Dept: NEUROLOGY | Facility: CLINIC | Age: 89
End: 2024-05-20
Payer: MEDICARE

## 2024-05-20 VITALS
BODY MASS INDEX: 25.91 KG/M2 | TEMPERATURE: 97.7 F | OXYGEN SATURATION: 100 % | WEIGHT: 132 LBS | SYSTOLIC BLOOD PRESSURE: 139 MMHG | HEIGHT: 60 IN | HEART RATE: 67 BPM | DIASTOLIC BLOOD PRESSURE: 71 MMHG

## 2024-05-20 DIAGNOSIS — H05.20 UNSPECIFIED EXOPHTHALMOS: ICD-10-CM

## 2024-05-20 DIAGNOSIS — R29.898 OTHER SYMPTOMS AND SIGNS INVOLVING THE MUSCULOSKELETAL SYSTEM: ICD-10-CM

## 2024-05-20 DIAGNOSIS — E05.00 THYROTOXICOSIS WITH DIFFUSE GOITER W/OUT THYROTOXIC CRISIS OR STORM: ICD-10-CM

## 2024-05-20 PROCEDURE — 99214 OFFICE O/P EST MOD 30 MIN: CPT

## 2024-05-20 RX ORDER — DONEPEZIL HYDROCHLORIDE 10 MG/1
10 TABLET ORAL DAILY
Qty: 90 | Refills: 3 | Status: ACTIVE | COMMUNITY
Start: 2023-02-28 | End: 1900-01-01

## 2024-05-20 RX ORDER — QUETIAPINE FUMARATE 25 MG/1
25 TABLET ORAL
Qty: 180 | Refills: 3 | Status: ACTIVE | COMMUNITY
Start: 2023-02-28 | End: 1900-01-01

## 2024-05-21 LAB
ALBUMIN SERPL ELPH-MCNC: 4.7 G/DL
ALP BLD-CCNC: 109 U/L
ALT SERPL-CCNC: 13 U/L
ANION GAP SERPL CALC-SCNC: 16 MMOL/L
AST SERPL-CCNC: 17 U/L
BASOPHILS # BLD AUTO: 0.04 K/UL
BASOPHILS NFR BLD AUTO: 0.4 %
BILIRUB SERPL-MCNC: 0.3 MG/DL
BUN SERPL-MCNC: 27 MG/DL
CALCIUM SERPL-MCNC: 10.6 MG/DL
CHLORIDE SERPL-SCNC: 103 MMOL/L
CO2 SERPL-SCNC: 23 MMOL/L
CREAT SERPL-MCNC: 1.24 MG/DL
EGFR: 42 ML/MIN/1.73M2
EOSINOPHIL # BLD AUTO: 0.06 K/UL
EOSINOPHIL NFR BLD AUTO: 0.7 %
GLUCOSE SERPL-MCNC: 114 MG/DL
HCT VFR BLD CALC: 42.4 %
HGB BLD-MCNC: 13.5 G/DL
IMM GRANULOCYTES NFR BLD AUTO: 0.6 %
LYMPHOCYTES # BLD AUTO: 3.02 K/UL
LYMPHOCYTES NFR BLD AUTO: 33.2 %
MAN DIFF?: NORMAL
MCHC RBC-ENTMCNC: 30 PG
MCHC RBC-ENTMCNC: 31.8 GM/DL
MCV RBC AUTO: 94.2 FL
MONOCYTES # BLD AUTO: 0.8 K/UL
MONOCYTES NFR BLD AUTO: 8.8 %
NEUTROPHILS # BLD AUTO: 5.12 K/UL
NEUTROPHILS NFR BLD AUTO: 56.3 %
PLATELET # BLD AUTO: 326 K/UL
POTASSIUM SERPL-SCNC: 4.1 MMOL/L
PROT SERPL-MCNC: 7.8 G/DL
RBC # BLD: 4.5 M/UL
RBC # FLD: 12.6 %
SODIUM SERPL-SCNC: 142 MMOL/L
TSH SERPL-ACNC: 1.68 UIU/ML
WBC # FLD AUTO: 9.09 K/UL

## 2024-05-22 NOTE — DISCUSSION/SUMMARY
[FreeTextEntry1] : Continue donepezil and quetiapine.  I shall call her daughter for further information. I have reviewed the MRI scan from 2022 which shows parenchymal volume loss and mild T2 weighted hyperintensity signals in the periventricular mild white matter favoring Alzheimer's disease rather than vascular dementia.  I reviewed her other labs.  Encephalopathy profile B12 folate sedimentation rate not significantly abnormal.Her headaches are under control her headaches are under control and she has ptosis and no recent thyroid tests.  Of asked her to continue quetiapine and advised her to perform her thyroid-stimulating hormone test.

## 2024-05-22 NOTE — HISTORY OF PRESENT ILLNESS
[FreeTextEntry1] : Repeats herself; very forgetful; needs help and reminders for appointments, schedules tasks. Denies headache. 5/20/2024: She was brought to the office by her son-in-law, but her son-in-law did not accompany her to the appointment.  Her daughter has a main caregiver.  She continues to have worsening of her memory.  She is tolerating her medications.  She denies headache and does not have any trouble with sleep anymore.  She claims to be independent with activities of daily living, including showering, hygiene, grooming, continence, feeding.  She is ambulatory without assistance.

## 2024-05-22 NOTE — PHYSICAL EXAM
[Registration Intact] : recent registration memory intact [Repeating Phrases] : no difficulty repeating a phrase [Comprehension] : comprehension intact [___ / 5] : Visuospatial / Executive: [unfilled] / 5 [___ / 3] : Attention (Serial 7 subtraction): [unfilled] / 3 [___ / 1] : Fluency: [unfilled] / 1 [___ / 2] : Abstraction: [unfilled] / 2 [___ / 5] : Delayed Recall: [unfilled] / 5 [___ / 6] : Orientation: [unfilled] / 6 [Cranial Nerves Optic (II)] : visual acuity intact bilaterally,  visual fields full to confrontation, pupils equal round and reactive to light [Cranial Nerves Oculomotor (III)] : extraocular motion intact [Cranial Nerves Trigeminal (V)] : facial sensation intact symmetrically [Cranial Nerves Facial (VII)] : face symmetrical [Cranial Nerves Vestibulocochlear (VIII)] : hearing was intact bilaterally [Cranial Nerves Glossopharyngeal (IX)] : tongue and palate midline [Cranial Nerves Accessory (XI - Cranial And Spinal)] : head turning and shoulder shrug symmetric [Cranial Nerves Hypoglossal (XII)] : there was no tongue deviation with protrusion [Motor Strength] : muscle strength was normal in all four extremities [Sensation Tactile Decrease] : light touch was intact [Sensation Vibration Decrease] : vibration was intact [Limited Balance] : the patient's balance was impaired [Dysdiadochokinesia Bilaterally] : present bilaterally [2+] : Triceps left 2+ [1+] : Ankle jerk left 1+ [Sclera] : the sclera and conjunctiva were normal [PERRL With Normal Accommodation] : pupils were equal in size, round, reactive to light, with normal accommodation [Extraocular Movements] : extraocular movements were intact [Outer Ear] : the ears and nose were normal in appearance [Oropharynx] : the oropharynx was normal [Neck Appearance] : the appearance of the neck was normal [Neck Cervical Mass (___cm)] : no neck mass was observed [Jugular Venous Distention Increased] : there was no jugular-venous distention [Thyroid Diffuse Enlargement] : the thyroid was not enlarged [Thyroid Nodule] : there were no palpable thyroid nodules [Auscultation Breath Sounds / Voice Sounds] : lungs were clear to auscultation bilaterally [Heart Rate And Rhythm] : heart rate was normal and rhythm regular [Heart Sounds] : normal S1 and S2 [Heart Sounds Gallop] : no gallops [Murmurs] : no murmurs [Heart Sounds Pericardial Friction Rub] : no pericardial rub [Full Pulse] : the pedal pulses are present [Edema] : there was no peripheral edema [Bowel Sounds] : normal bowel sounds [Abdomen Soft] : soft [Abdomen Tenderness] : non-tender [] : no hepato-splenomegaly [Abdomen Mass (___ Cm)] : no abdominal mass palpated [Short Term Intact] : short term memory impaired [Span Intact] : the attention span was decreased [Naming Objects] : difficulty naming common objects [Fluency] : fluency not intact [Vocabulary] : inadequate range of vocabulary [Paresis Pronator Drift Right-Sided] : no pronator drift on the right [Paresis Pronator Drift Left-Sided] : no pronator drift on the left [Motor Strength Upper Extremities Bilaterally] : strength was normal in both upper extremities [Motor Strength Lower Extremities Bilaterally] : strength was normal in both lower extremities [Coordination - Dysmetria Impaired Finger-to-Nose Bilateral] : not present [Coordination - Dysmetria Impaired Heel-to-Shin Bilateral] : not present [Plantar Reflex Right Only] : normal on the right [Plantar Reflex Left Only] : normal on the left [MocaTotal] : 11

## 2024-06-07 ENCOUNTER — EMERGENCY (EMERGENCY)
Facility: HOSPITAL | Age: 89
LOS: 1 days | Discharge: HOME | End: 2024-06-07
Attending: EMERGENCY MEDICINE
Payer: COMMERCIAL

## 2024-06-07 VITALS
SYSTOLIC BLOOD PRESSURE: 155 MMHG | DIASTOLIC BLOOD PRESSURE: 71 MMHG | OXYGEN SATURATION: 95 % | RESPIRATION RATE: 18 BRPM | TEMPERATURE: 98 F | HEART RATE: 75 BPM

## 2024-06-07 VITALS
SYSTOLIC BLOOD PRESSURE: 163 MMHG | OXYGEN SATURATION: 99 % | HEIGHT: 59 IN | RESPIRATION RATE: 16 BRPM | DIASTOLIC BLOOD PRESSURE: 80 MMHG | TEMPERATURE: 98 F | WEIGHT: 132.28 LBS | HEART RATE: 74 BPM

## 2024-06-07 DIAGNOSIS — Z90.49 ACQUIRED ABSENCE OF OTHER SPECIFIED PARTS OF DIGESTIVE TRACT: Chronic | ICD-10-CM

## 2024-06-07 DIAGNOSIS — Z96.659 PRESENCE OF UNSPECIFIED ARTIFICIAL KNEE JOINT: Chronic | ICD-10-CM

## 2024-06-07 DIAGNOSIS — Z98.890 OTHER SPECIFIED POSTPROCEDURAL STATES: Chronic | ICD-10-CM

## 2024-06-07 LAB
ALBUMIN SERPL ELPH-MCNC: 3.9 G/DL — SIGNIFICANT CHANGE UP (ref 3.5–5)
ALP SERPL-CCNC: 115 U/L — SIGNIFICANT CHANGE UP (ref 40–120)
ALT FLD-CCNC: 18 U/L DA — SIGNIFICANT CHANGE UP (ref 10–60)
ANION GAP SERPL CALC-SCNC: 6 MMOL/L — SIGNIFICANT CHANGE UP (ref 5–17)
APPEARANCE UR: CLEAR — SIGNIFICANT CHANGE UP
AST SERPL-CCNC: 15 U/L — SIGNIFICANT CHANGE UP (ref 10–40)
BACTERIA # UR AUTO: ABNORMAL /HPF
BILIRUB SERPL-MCNC: 0.4 MG/DL — SIGNIFICANT CHANGE UP (ref 0.2–1.2)
BILIRUB UR-MCNC: NEGATIVE — SIGNIFICANT CHANGE UP
BUN SERPL-MCNC: 17 MG/DL — SIGNIFICANT CHANGE UP (ref 7–18)
CALCIUM SERPL-MCNC: 9.8 MG/DL — SIGNIFICANT CHANGE UP (ref 8.4–10.5)
CHLORIDE SERPL-SCNC: 108 MMOL/L — SIGNIFICANT CHANGE UP (ref 96–108)
CO2 SERPL-SCNC: 26 MMOL/L — SIGNIFICANT CHANGE UP (ref 22–31)
COLOR SPEC: YELLOW — SIGNIFICANT CHANGE UP
CREAT SERPL-MCNC: 0.86 MG/DL — SIGNIFICANT CHANGE UP (ref 0.5–1.3)
DIFF PNL FLD: NEGATIVE — SIGNIFICANT CHANGE UP
EGFR: 65 ML/MIN/1.73M2 — SIGNIFICANT CHANGE UP
EPI CELLS # UR: PRESENT
GLUCOSE SERPL-MCNC: 114 MG/DL — HIGH (ref 70–99)
GLUCOSE UR QL: NEGATIVE MG/DL — SIGNIFICANT CHANGE UP
HCT VFR BLD CALC: 41.6 % — SIGNIFICANT CHANGE UP (ref 34.5–45)
HGB BLD-MCNC: 13.6 G/DL — SIGNIFICANT CHANGE UP (ref 11.5–15.5)
HYALINE CASTS # UR AUTO: PRESENT
KETONES UR-MCNC: ABNORMAL MG/DL
LEUKOCYTE ESTERASE UR-ACNC: NEGATIVE — SIGNIFICANT CHANGE UP
MCHC RBC-ENTMCNC: 29.7 PG — SIGNIFICANT CHANGE UP (ref 27–34)
MCHC RBC-ENTMCNC: 32.7 GM/DL — SIGNIFICANT CHANGE UP (ref 32–36)
MCV RBC AUTO: 90.8 FL — SIGNIFICANT CHANGE UP (ref 80–100)
NITRITE UR-MCNC: NEGATIVE — SIGNIFICANT CHANGE UP
NRBC # BLD: 0 /100 WBCS — SIGNIFICANT CHANGE UP (ref 0–0)
PH UR: 6 — SIGNIFICANT CHANGE UP (ref 5–8)
PLATELET # BLD AUTO: 308 K/UL — SIGNIFICANT CHANGE UP (ref 150–400)
POTASSIUM SERPL-MCNC: 4.2 MMOL/L — SIGNIFICANT CHANGE UP (ref 3.5–5.3)
POTASSIUM SERPL-SCNC: 4.2 MMOL/L — SIGNIFICANT CHANGE UP (ref 3.5–5.3)
PROT SERPL-MCNC: 7.9 G/DL — SIGNIFICANT CHANGE UP (ref 6–8.3)
PROT UR-MCNC: ABNORMAL MG/DL
RBC # BLD: 4.58 M/UL — SIGNIFICANT CHANGE UP (ref 3.8–5.2)
RBC # FLD: 12.8 % — SIGNIFICANT CHANGE UP (ref 10.3–14.5)
RBC CASTS # UR COMP ASSIST: 0 /HPF — SIGNIFICANT CHANGE UP (ref 0–4)
SODIUM SERPL-SCNC: 140 MMOL/L — SIGNIFICANT CHANGE UP (ref 135–145)
SP GR SPEC: 1.02 — SIGNIFICANT CHANGE UP (ref 1–1.03)
UROBILINOGEN FLD QL: 1 MG/DL — SIGNIFICANT CHANGE UP (ref 0.2–1)
WBC # BLD: 10.79 K/UL — HIGH (ref 3.8–10.5)
WBC # FLD AUTO: 10.79 K/UL — HIGH (ref 3.8–10.5)
WBC UR QL: 2 /HPF — SIGNIFICANT CHANGE UP (ref 0–5)

## 2024-06-07 PROCEDURE — 99285 EMERGENCY DEPT VISIT HI MDM: CPT

## 2024-06-07 PROCEDURE — 81001 URINALYSIS AUTO W/SCOPE: CPT

## 2024-06-07 PROCEDURE — 80053 COMPREHEN METABOLIC PANEL: CPT

## 2024-06-07 PROCEDURE — 36415 COLL VENOUS BLD VENIPUNCTURE: CPT

## 2024-06-07 PROCEDURE — 74177 CT ABD & PELVIS W/CONTRAST: CPT | Mod: MC

## 2024-06-07 PROCEDURE — 99284 EMERGENCY DEPT VISIT MOD MDM: CPT | Mod: 25

## 2024-06-07 PROCEDURE — 85027 COMPLETE CBC AUTOMATED: CPT

## 2024-06-07 PROCEDURE — 74177 CT ABD & PELVIS W/CONTRAST: CPT | Mod: 26,MC

## 2024-06-07 NOTE — ED PROVIDER NOTE - PROGRESS NOTE DETAILS
CT results pending, signed out to Johnson for final dispo Patient pain controlled clinically well CT no acute finding return precautions and follow-up PMD  hemodynamically stable neurovasc intact on discharge

## 2024-06-07 NOTE — ED PROVIDER NOTE - NSFOLLOWUPINSTRUCTIONS_ED_ALL_ED_FT
Dolor abdominal en los adultos  Abdominal Pain, Adult  A health care provider talking to a person during a medical exam.  El dolor en el abdomen (dolor abdominal) puede tener muchas causas. En la mayoría de los casos, mejora sin tratamiento o al recibir tratamiento en el hogar. Richy en algunos casos, puede ser grave.    El médico le hará preguntas sobre [place] antecedentes médicos y le hará un examen físico para tratar de comprender la causa del dolor.    Siga estas instrucciones en dyer casa:  Medicamentos    Theodosia los medicamentos de venta jolene y los recetados solamente juan se lo haya indicado el médico.  No tome medicamentos que lo ayuden a defecar (laxantes), yohan que el médico se lo indique.  Instrucciones generales    Controle dyer afección para detectar cualquier cambio.  Beber suficiente líquido para mantener el pis (orina) de color amarillo pálido.  Comuníquese con un médico si:  El dolor cambia, empeora o dura más de lo previsto.  Tiene cólicos o distensión abdominal intensos, o vomita.  El dolor empeora con las comidas, después de comer o con determinados alimentos.  Está estreñido o tiene diarrea aguilar más de 2 o 3 días.  No tiene apetito o baja de peso sin proponérselo.  Tiene signos de deshidratación. Pueden incluir:  Orina oscura, muy escasa o falta de orina.  Labios agrietados o boca seca.  Somnolencia o debilidad.  Tiene dolor al hacer pis (orinar) o al defecar.  El dolor abdominal lo despierta de noche.  Observa prashanth en la orina.  Tiene fiebre.  Solicite ayuda de inmediato si:  No puede dejar de vomitar.  El dolor solo se localiza en harris parte del abdomen. Si se localiza en la britni derecha, posiblemente podría tratarse de apendicitis.  Produce materia fecal (heces) con prashanth, de color yomi, o con aspecto alquitranado.  Tiene dificultad para respirar.  Tiene dolor en el pecho.  Estos síntomas pueden indicar harris emergencia. Solicite ayuda de inmediato. Llame al 911.  No espere a ena si los síntomas desaparecen.  No conduzca por [place] propios medios hasta el hospital.  Esta información no tiene juan fin reemplazar el consejo del médico. Asegúrese de hacerle al médico cualquier pregunta que tenga.

## 2024-06-07 NOTE — ED PROVIDER NOTE - OBJECTIVE STATEMENT
[id number],  here with grandson.  Comes in for evaluation abdominal pain that started last night she reports a history of "stomach stones" with a gastroenterologist we are moving endoscopically shortly.  She reports a rash to her scalp that is bothersome as well as her leg with her previous knee replacement surgery occurred  that occasionally swells up and causes irritation.  No chest pain shortness of breath nausea vomit area.  Reports some belching sensation.  No fevers no chills no sick contacts non-smoker nondrinker nondrug user.  Already has an appointment with dermatologist for rash already has appoint with a gastroenterologist for stomach stones and has good access care to primary care physician

## 2024-06-07 NOTE — ED PROVIDER NOTE - PATIENT PORTAL LINK FT
You can access the FollowMyHealth Patient Portal offered by [facility] by registering at the following website: [email or web address]. By joining [facility]’s FollowMyHealth portal, you will also be able to view your health information using other applications (apps) compatible with our system.

## 2024-06-07 NOTE — ED ADULT NURSE NOTE - OBJECTIVE STATEMENT
Patient came to the ED a/o x 3 ambulates c/o generalized abdominal pain x today. +nausea, no vomiting/ diarrhea/ tenderness

## 2024-06-07 NOTE — ED ADULT NURSE NOTE - NSFALLRISK_ED_ALL_ED
1.  You were seen in the ENT Clinic today by LALY Maynard. If you have any questions or concerns after your appointment, please call [phone].    2.  Plan is to follow-up as needed.    Thank you!  Kalli Hanks       No

## 2024-06-07 NOTE — ED PROVIDER NOTE - CLINICAL SUMMARY MEDICAL DECISION MAKING FREE TEXT BOX
elderly female with stomach discomfort at home nausea and belching.  Does not sound consistent with ACS  Check labs check CT for acute pathology such as cholecystitis and/or colitis.  UA for UTI chest x-ray for pneumonia dispo per   the chest x-ray myself no acute pathology noted

## 2024-06-07 NOTE — ED PROVIDER NOTE - IV ALTEPLASE EXCL ABS HIDDEN
Wound Care: No ointment Tarsorrhaphy Text: A tarsorrhaphy was performed using Frost sutures. Tarsorrhaphy Performed?: No Consent: The rationale for Repairs was explained to the patient and consent was obtained. The risks, benefits and alternatives to therapy were discussed in detail. Specifically, the risks of infection, scarring, bleeding, prolonged wound healing, incomplete removal, allergy to anesthesia, nerve injury and recurrence were addressed. Prior to the procedure, the treatment site was clearly identified and confirmed by the patient. All components of Universal Protocol/PAUSE Rule completed. Primary Defect Length (In Cm): 2.2 Skin Substitute: Affinity Detail Level: Detailed Repair Type: Graft Primary Defect Width (In Cm): 2.1 show Estimated Blood Loss (Cc): minimal Post-Care Instructions: I reviewed with the patient in detail post-care instructions. Patient is not to engage in any heavy lifting, exercise, or swimming for the next 14 days. Should the patient develop any fevers, chills, bleeding, severe pain patient will contact the office immediately. Anesthesia Volume In Cc: 0 Dressing: Adaptic Graft Type: Skin Substitute Skin Substitute Units (Will Override Primary Defect Units If Greater Than 0): 7 Affinity Text: The defect edges were debeveled with a #15 scalpel blade.  Given the location of the defect, shape of the defect and the proximity to free margins a skin substitute graft was deemed most appropriate.  The PuraPly was trimmed to fit the size of the defect. The graft was then placed in the primary defect and oriented appropriately.

## 2024-06-07 NOTE — ED PROVIDER NOTE - PRO INTERPRETER NEED 2
[place] Azithromycin Pregnancy And Lactation Text: This medication is considered safe during pregnancy and is also secreted in breast milk.

## 2024-06-11 NOTE — ASSESSMENT
Vitamin D level is excellent.  Continue twice monthly ergocalciferol.     [FreeTextEntry1] : Dyspepsia: The patient complains of dyspeptic symptoms.  The patient was advised to continue to abide by an anti-gas (low FOD-MAP) diet.  The patient was previously given a pamphlet for anti-gas (low FOD-MAP).  The patient and I reviewed the anti-gas (low FOD-MAP)diet at length again. The patient is to continue on a trial of Simethicone one tablet 4 times a day p.r.n. abdominal pain and gas.\par Constipation: The patient complains of constipation. I recommend a high-fiber diet. I recommend a trial of a probiotic such as Align once a day. I recommend a trial of Metamucil once a day for fiber supplementation.The patient agreed and will followup to reassess the symptoms.  \par Abnormal Imaging Studies: The CAT scan of the abdomen and pelvis with IV contrast performed on December 9, 2019 revealed a 3 mm distal CBD calcific density possibly a ductal calculus. Also noted was colonic diverticulosis without CT evidence of acute diverticulitis and no urolithiasis or hydronephrosis. The MRCP performed on December 30, 2019 revealed s/p cholecystectomy with stable 9 mm dilated CBD. There are now 2 small distal CBD calculi measuring 4 mm. Also noted was a stable 1 cm maximal longitudinal dimension medial pancreatic head cyst. Also noted was hepatic steatosis. 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 \par Choledocholithiasis: The patient was found to have an abnormal study suggestive of choledocholithiasis. The patient is status post upper endoscopy and ERCP performed by Dr. Dennys Jefferson on January 21, 2021. The upper endoscopy with biopsy performed on January 21, 2021 revealed gastritis and duodenal bulb and duodenum with clean-based ulcers. The pathology revealed antral mucosa with chronic inactive gastritis and was negative for Helicobacter pylori. The ERCP performed on January 21, 2021 revealed choledocholithiasis. The patient is status post ERCP with biliary sphincterotomy and stone removal. The patient tolerated the procedure well. \par Fatty Liver: The patient had an imaging study suggestive of fatty infiltration of the liver. The MRCP performed on December 30, 2019 revealed s/p cholecystectomy with stable 9 mm dilated CBD. There are now 2 small distal CBD calculi measuring 4 mm. Also noted was a stable 1 cm maximal longitudinal dimension medial pancreatic head cyst. Also noted was hepatic steatosis. The patient denies any jaundice or pruritus. The patient denies any alcohol use. The patient denies taking large doses of nonsteroidal anti-inflammatory drugs or acetaminophen. The findings are suggestive of fatty liver. The patient and I had a long discussion regarding the risks of fatty liver and possible progression to cirrhosis. The patient was told of the possible increased risk of developing liver failure, cirrhosis, ascites, GI bleeding secondary to varices, hepatic encephalopathy, bleeding tendencies and liver cancer. The patient was told of the importance of follow-up. The patient was advised to follow up every 6 months for blood work and imaging studies. The patient agreed and will follow up. The patient was advised to lose weight. I recommend a trial of vitamin E supplementation for the fatty liver. If the liver enzymes remain elevated, the patient may require a trial of Pioglitazone for the fatty liver. I recommend avoid alcohol and hepato-toxic agents. The patient was also advised to avoid NSAIDs, Acetaminophen and any other hepatotoxic drugs. The patient was also advised not to share needles, razors, scissors, nail clippers, etc.. The patient is to continue close follow-up in our office for blood work and exams. If the liver enzymes remain elevated, the patient may require a CT guided liver biopsy to assess the liver parenchyma for possible treatment. We had a long discussion regarding the risks and benefits of the procedure. The patient was told of the risks of bleeding, perforation, infections, emergency surgery and missing lesions. The patient agreed and will follow-up to reassess the symptoms. \par Pancreatic Cystic Lesion: The patient was recently diagnosed with a pancreatic cystic lesion on recent MRCP. The patient complained of prior back pain. She denies any jaundice, pruritus. The patient currently denies any abdominal pain. The patient denies any prior history of EtOH abuse. The MRCP performed on December 30, 2019 revealed s/p cholecystectomy with stable 9 mm dilated CBD. There are now 2 small distal CBD calculi measuring 4 mm. Also noted was a stable 1 cm maximal longitudinal dimension medial pancreatic head cyst. Also noted was hepatic steatosis. The patient is status post upper endoscopy and ERCP performed by Dr. Dennys Jefferson on January 21, 2021. The upper endoscopy with biopsy performed on January 21, 2021 revealed gastritis and duodenal bulb and duodenum with clean-based ulcers. The pathology revealed antral mucosa with chronic inactive gastritis and was negative for Helicobacter pylori. The ERCP performed on January 21, 2021 revealed choledocholithiasis. The patient is status post ERCP with biliary sphincterotomy and stone removal. The patient tolerated the procedure well. \par Family History of GI Malignancy: The patient admits to a family history of GI problems. The patient’s mother had a history of pancreatic cancer at age 65.\par Prior Endoscopic Procedures: The patient had a prior upper endoscopy and colonoscopy performed by another gastroenterologist. I will try to obtain the prior upper endoscopy and colonoscopy reports. The patient is to sign another record release to obtain those records.\par Blood Work: I recommend blood work to assess the liver. I recommend a CBC, SMA 24, amylase, lipase, ESR, TFTs, ADRY, rheumatoid factor, celiac sprue panel, IgA, profile for hepatitis A, B, C. ,AFP, alpha 1 anti-trypsin  antibody, ceruloplasmin level, iron, TIBC, ferritin level, AMA, anti smooth muscle antibody, CEA, CA 19-9 and PT/INR/PTT.  I also recommend obtaining the recent blood work performed by the patient's PMD.\par Follow-up: The patient is to follow-up in the office in 6 months to reassess the symptoms. The patient was told to call the office if any further problems. \par \par

## 2024-07-22 ENCOUNTER — NON-APPOINTMENT (OUTPATIENT)
Age: 89
End: 2024-07-22

## 2024-07-22 ENCOUNTER — APPOINTMENT (OUTPATIENT)
Dept: CARDIOLOGY | Facility: CLINIC | Age: 89
End: 2024-07-22
Payer: MEDICARE

## 2024-07-22 VITALS
OXYGEN SATURATION: 96 % | DIASTOLIC BLOOD PRESSURE: 77 MMHG | SYSTOLIC BLOOD PRESSURE: 148 MMHG | WEIGHT: 132 LBS | TEMPERATURE: 97.9 F | BODY MASS INDEX: 25.78 KG/M2 | HEART RATE: 84 BPM

## 2024-07-22 DIAGNOSIS — M31.6 OTHER GIANT CELL ARTERITIS: ICD-10-CM

## 2024-07-22 DIAGNOSIS — Z13.6 ENCOUNTER FOR SCREENING FOR CARDIOVASCULAR DISORDERS: ICD-10-CM

## 2024-07-22 DIAGNOSIS — R01.1 CARDIAC MURMUR, UNSPECIFIED: ICD-10-CM

## 2024-07-22 DIAGNOSIS — E78.00 PURE HYPERCHOLESTEROLEMIA, UNSPECIFIED: ICD-10-CM

## 2024-07-22 DIAGNOSIS — I10 ESSENTIAL (PRIMARY) HYPERTENSION: ICD-10-CM

## 2024-07-22 PROCEDURE — 99204 OFFICE O/P NEW MOD 45 MIN: CPT | Mod: 25

## 2024-07-22 PROCEDURE — G2211 COMPLEX E/M VISIT ADD ON: CPT | Mod: NC

## 2024-07-22 PROCEDURE — 93000 ELECTROCARDIOGRAM COMPLETE: CPT

## 2024-08-01 ENCOUNTER — APPOINTMENT (OUTPATIENT)
Dept: RHEUMATOLOGY | Facility: CLINIC | Age: 89
End: 2024-08-01

## 2024-08-19 NOTE — H&P ADULT - HISTORY OF PRESENT ILLNESS
84/F form home lives with daughter independent at baseline with PMH of dementia, HTN, HLD presented with dizziness and upper abdominal pain. Patient reports that today she started having dizziness which she describes as room spinning sensation associated with nausea, worsening with head movement, improving with lying down and closed eyes, she had similar episode in past for which she was told that its form ear problems and which improved with medications.  Denies episode of vomiting, fall, ear pain or discharge. She also mentions epigastric colicky pain which is 8/10 in intensity, no aggravating or reliving factors. Denies vomiting, diarrhea, SOB, SCHWARTZ, palpitations, headache, cough, wheezing, joint pain or swelling, fever, chills.
[No, patient denies ideation or behavior] : No, patient denies ideation or behavior

## 2024-11-11 ENCOUNTER — EMERGENCY (EMERGENCY)
Facility: HOSPITAL | Age: 89
LOS: 1 days | Discharge: HOME | End: 2024-11-11
Attending: STUDENT IN AN ORGANIZED HEALTH CARE EDUCATION/TRAINING PROGRAM
Payer: MEDICARE

## 2024-11-11 VITALS
HEIGHT: 60 IN | OXYGEN SATURATION: 97 % | SYSTOLIC BLOOD PRESSURE: 152 MMHG | DIASTOLIC BLOOD PRESSURE: 82 MMHG | RESPIRATION RATE: 18 BRPM | TEMPERATURE: 98 F | WEIGHT: 132.06 LBS | HEART RATE: 79 BPM

## 2024-11-11 VITALS
RESPIRATION RATE: 18 BRPM | OXYGEN SATURATION: 96 % | HEART RATE: 73 BPM | DIASTOLIC BLOOD PRESSURE: 77 MMHG | TEMPERATURE: 97 F | SYSTOLIC BLOOD PRESSURE: 135 MMHG

## 2024-11-11 DIAGNOSIS — Z90.49 ACQUIRED ABSENCE OF OTHER SPECIFIED PARTS OF DIGESTIVE TRACT: Chronic | ICD-10-CM

## 2024-11-11 DIAGNOSIS — Z96.659 PRESENCE OF UNSPECIFIED ARTIFICIAL KNEE JOINT: Chronic | ICD-10-CM

## 2024-11-11 DIAGNOSIS — Z98.890 OTHER SPECIFIED POSTPROCEDURAL STATES: Chronic | ICD-10-CM

## 2024-11-11 LAB
ALBUMIN SERPL ELPH-MCNC: 3.6 G/DL — SIGNIFICANT CHANGE UP (ref 3.5–5)
ALP SERPL-CCNC: 77 U/L — SIGNIFICANT CHANGE UP (ref 40–120)
ALT FLD-CCNC: 15 U/L DA — SIGNIFICANT CHANGE UP (ref 10–60)
ANION GAP SERPL CALC-SCNC: 6 MMOL/L — SIGNIFICANT CHANGE UP (ref 5–17)
APTT BLD: 27.7 SEC — SIGNIFICANT CHANGE UP (ref 24.5–35.6)
AST SERPL-CCNC: 14 U/L — SIGNIFICANT CHANGE UP (ref 10–40)
BASOPHILS # BLD AUTO: 0.05 K/UL — SIGNIFICANT CHANGE UP (ref 0–0.2)
BASOPHILS NFR BLD AUTO: 0.5 % — SIGNIFICANT CHANGE UP (ref 0–2)
BILIRUB SERPL-MCNC: 0.7 MG/DL — SIGNIFICANT CHANGE UP (ref 0.2–1.2)
BUN SERPL-MCNC: 18 MG/DL — SIGNIFICANT CHANGE UP (ref 7–18)
CALCIUM SERPL-MCNC: 10 MG/DL — SIGNIFICANT CHANGE UP (ref 8.4–10.5)
CHLORIDE SERPL-SCNC: 106 MMOL/L — SIGNIFICANT CHANGE UP (ref 96–108)
CO2 SERPL-SCNC: 26 MMOL/L — SIGNIFICANT CHANGE UP (ref 22–31)
CREAT SERPL-MCNC: 0.96 MG/DL — SIGNIFICANT CHANGE UP (ref 0.5–1.3)
CRP SERPL-MCNC: 67.6 MG/L — HIGH (ref 0–5)
EGFR: 57 ML/MIN/1.73M2 — LOW
EOSINOPHIL # BLD AUTO: 0.09 K/UL — SIGNIFICANT CHANGE UP (ref 0–0.5)
EOSINOPHIL NFR BLD AUTO: 0.9 % — SIGNIFICANT CHANGE UP (ref 0–6)
ERYTHROCYTE [SEDIMENTATION RATE] IN BLOOD: 53 MM/HR — HIGH (ref 0–20)
GLUCOSE SERPL-MCNC: 128 MG/DL — HIGH (ref 70–99)
HCT VFR BLD CALC: 36.2 % — SIGNIFICANT CHANGE UP (ref 34.5–45)
HGB BLD-MCNC: 12.2 G/DL — SIGNIFICANT CHANGE UP (ref 11.5–15.5)
IMM GRANULOCYTES NFR BLD AUTO: 0.6 % — SIGNIFICANT CHANGE UP (ref 0–0.9)
INR BLD: 0.97 RATIO — SIGNIFICANT CHANGE UP (ref 0.85–1.16)
LACTATE SERPL-SCNC: 1.5 MMOL/L — SIGNIFICANT CHANGE UP (ref 0.7–2)
LACTATE SERPL-SCNC: 2.6 MMOL/L — HIGH (ref 0.7–2)
LYMPHOCYTES # BLD AUTO: 2.33 K/UL — SIGNIFICANT CHANGE UP (ref 1–3.3)
LYMPHOCYTES # BLD AUTO: 22.4 % — SIGNIFICANT CHANGE UP (ref 13–44)
MCHC RBC-ENTMCNC: 30.6 PG — SIGNIFICANT CHANGE UP (ref 27–34)
MCHC RBC-ENTMCNC: 33.7 G/DL — SIGNIFICANT CHANGE UP (ref 32–36)
MCV RBC AUTO: 90.7 FL — SIGNIFICANT CHANGE UP (ref 80–100)
MONOCYTES # BLD AUTO: 1.16 K/UL — HIGH (ref 0–0.9)
MONOCYTES NFR BLD AUTO: 11.2 % — SIGNIFICANT CHANGE UP (ref 2–14)
NEUTROPHILS # BLD AUTO: 6.69 K/UL — SIGNIFICANT CHANGE UP (ref 1.8–7.4)
NEUTROPHILS NFR BLD AUTO: 64.4 % — SIGNIFICANT CHANGE UP (ref 43–77)
NRBC # BLD: 0 /100 WBCS — SIGNIFICANT CHANGE UP (ref 0–0)
PLATELET # BLD AUTO: 301 K/UL — SIGNIFICANT CHANGE UP (ref 150–400)
POTASSIUM SERPL-MCNC: 3.8 MMOL/L — SIGNIFICANT CHANGE UP (ref 3.5–5.3)
POTASSIUM SERPL-SCNC: 3.8 MMOL/L — SIGNIFICANT CHANGE UP (ref 3.5–5.3)
PROT SERPL-MCNC: 7.7 G/DL — SIGNIFICANT CHANGE UP (ref 6–8.3)
PROTHROM AB SERPL-ACNC: 11.2 SEC — SIGNIFICANT CHANGE UP (ref 9.9–13.4)
RBC # BLD: 3.99 M/UL — SIGNIFICANT CHANGE UP (ref 3.8–5.2)
RBC # FLD: 12.5 % — SIGNIFICANT CHANGE UP (ref 10.3–14.5)
SODIUM SERPL-SCNC: 138 MMOL/L — SIGNIFICANT CHANGE UP (ref 135–145)
WBC # BLD: 10.38 K/UL — SIGNIFICANT CHANGE UP (ref 3.8–10.5)
WBC # FLD AUTO: 10.38 K/UL — SIGNIFICANT CHANGE UP (ref 3.8–10.5)

## 2024-11-11 PROCEDURE — 85025 COMPLETE CBC W/AUTO DIFF WBC: CPT

## 2024-11-11 PROCEDURE — 80053 COMPREHEN METABOLIC PANEL: CPT

## 2024-11-11 PROCEDURE — 93971 EXTREMITY STUDY: CPT | Mod: 26,LT

## 2024-11-11 PROCEDURE — 96365 THER/PROPH/DIAG IV INF INIT: CPT

## 2024-11-11 PROCEDURE — 96366 THER/PROPH/DIAG IV INF ADDON: CPT

## 2024-11-11 PROCEDURE — 86140 C-REACTIVE PROTEIN: CPT

## 2024-11-11 PROCEDURE — 83605 ASSAY OF LACTIC ACID: CPT

## 2024-11-11 PROCEDURE — 99284 EMERGENCY DEPT VISIT MOD MDM: CPT

## 2024-11-11 PROCEDURE — 85610 PROTHROMBIN TIME: CPT

## 2024-11-11 PROCEDURE — 36415 COLL VENOUS BLD VENIPUNCTURE: CPT

## 2024-11-11 PROCEDURE — 85730 THROMBOPLASTIN TIME PARTIAL: CPT

## 2024-11-11 PROCEDURE — 87040 BLOOD CULTURE FOR BACTERIA: CPT

## 2024-11-11 PROCEDURE — 93971 EXTREMITY STUDY: CPT

## 2024-11-11 PROCEDURE — 85652 RBC SED RATE AUTOMATED: CPT

## 2024-11-11 PROCEDURE — 99284 EMERGENCY DEPT VISIT MOD MDM: CPT | Mod: 25

## 2024-11-11 RX ORDER — SODIUM CHLORIDE 9 MG/ML
2000 INJECTION, SOLUTION INTRAMUSCULAR; INTRAVENOUS; SUBCUTANEOUS ONCE
Refills: 0 | Status: COMPLETED | OUTPATIENT
Start: 2024-11-11 | End: 2024-11-11

## 2024-11-11 RX ORDER — DOXYCYCLINE HYCLATE 100 MG/1
100 TABLET, FILM COATED ORAL ONCE
Refills: 0 | Status: COMPLETED | OUTPATIENT
Start: 2024-11-11 | End: 2024-11-11

## 2024-11-11 RX ORDER — DOXYCYCLINE HYCLATE 100 MG/1
1 TABLET, FILM COATED ORAL
Qty: 14 | Refills: 0
Start: 2024-11-11 | End: 2024-11-17

## 2024-11-11 RX ADMIN — SODIUM CHLORIDE 2000 MILLILITER(S): 9 INJECTION, SOLUTION INTRAMUSCULAR; INTRAVENOUS; SUBCUTANEOUS at 12:47

## 2024-11-11 RX ADMIN — DOXYCYCLINE HYCLATE 110 MILLIGRAM(S): 100 TABLET, FILM COATED ORAL at 12:47

## 2024-11-11 RX ADMIN — SODIUM CHLORIDE 2000 MILLILITER(S): 9 INJECTION, SOLUTION INTRAMUSCULAR; INTRAVENOUS; SUBCUTANEOUS at 14:40

## 2024-11-11 RX ADMIN — DOXYCYCLINE HYCLATE 100 MILLIGRAM(S): 100 TABLET, FILM COATED ORAL at 14:40

## 2024-11-11 NOTE — ED ADULT NURSE NOTE - NS ED NOTE ABUSE SUSPICION NEGLECT YN
Had lengthy MyChart discussion with patient regarding pericardial effusion.  She would have to have follow up limited echo to assess the small effusion in about 2 months.   I will place order.    Can someone help with scheduling?      Thanks,   Devorah Marshall PA-C  6/3/2020     No

## 2024-11-11 NOTE — ED PROVIDER NOTE - NSFOLLOWUPINSTRUCTIONS_ED_ALL_ED_FT
Celulitis    LO QUE NECESITA SABER:    ¿Qué es la celulitis?La celulitis es harris infección en la piel causada por bacteria. La celulitis es común y puede llegar a ser grave. La celulitis suele aparecer en la parte inferior de las piernas. También puede aparecer en los brazos, la janie y otras áreas. La celulitis se desarrolla cuando las bacterias entran en harris grieta o rotura de la piel, juan un rasguño, harris mordedura o un yue.  Celulitis    ¿Cuáles son los signos y síntomas de la celulitis?Los signos y síntomas suelen aparecer en un costado del cuerpo. Puede presentar cualquiera de los siguientes signos o síntomas:    Fiebre    Un área rojiza, caliente e inflamada en foley piel    Dolor al tocar el área afectada    Manchas womack, protuberancias o ampollas    Piel abultada que sobresale y que se siente juan cáscara de naranja  ¿Cómo se diagnostica la celulitis?Foley médico podría determinar que usted tiene celulitis observando foley piel. Es posible que necesite análisis de prashanth para saber qué tipo de bacteria está causando la infección. Es posible que se necesiten otras pruebas para ena cuánto se ha propagado la infección.    ¿Cómo se trata la celulitis?Debería empezar a ena harris mejoría en 3 días. Si la celulitis es grave, cally vez necesite antibióticos intravenosos en el hospital. Si no se trata la celulitis, la infección puede propagarse por el cuerpo y ser potencialmente mortal. Es posible que usted necesite alguno de los siguientes medicamentos:    Los antibióticosayudan a tratar harris infección bacteriana.    Acetaminofénalivia el dolor y baja la fiebre. Está disponible sin receta médica. Pregunte la cantidad y la frecuencia con que debe tomarlos. Siga las indicaciones. Buck las etiquetas de todos los demás medicamentos que esté usando para saber si también contienen acetaminofén, o pregunte a foley médico o farmacéutico. El acetaminofén puede causar daño en el hígado cuando no se francis de forma correcta.    AINEcomo el ibuprofeno, ayudan a disminuir la inflamación, el dolor y la fiebre. Mera medicamento está disponible con o sin harris receta médica. Los KWAME pueden causar sangrado estomacal o problemas renales en ciertas personas. Si usted francis un medicamento anticoagulante, siempre pregúntele a foley médico si los KWAME son seguros para usted. Siempre buck la etiqueta de mera medicamento y siga las instrucciones.  ¿Cómo puedo controlar los síntomas?    Lave el área con agua y jabón cada día.Seque con suaves golpecitos. Use vendajes si se lo indica el médico.    Aplique crema o ungüento juan se le indique.Estos ayudan a proteger la britni. La mayoría de los productos de venta jolene, juan la vaselina, son buenos para usar. Pregunte a foley médico sobre las cremas o los ungüentos específicos que debería usar.    Coloque un paño fresco y húmedo en el área.Use paños limpios y agua limpia. Puede hacer esto tantas veces juan lo necesite. Los paños frescos y húmedos pueden ayudar a disminuir el dolor.    Eleve el área por encima del nivel de foley corazón.con la frecuencia posible. [place] va a disminuir inflamación y el dolor. Coloque el área sobre almohadas o sábanas para tratar de mantenerla elevada cómodamente.  Elevar la pierna  ¿Cómo puedo prevenir la celulitis?    No se rasque picaduras de insectos o áreas lesionadas.Rascarse estas áreas aumenta foley riesgo de tener celulitis.    No comparta los artículos de uso personal,juan toallas, ropa, o navajas de afeitar.    Limpie los equipos de ejerciciocon un detergente desinfectante antes y después de utilizarlo.    Trate el pie de atleta.[place] puede ayudar a prevenir la propagación de harris infección bacteriana en la piel.    Lávese las christy frecuentemente.Utilice agua y jabón. Lávese las christy después de usar el baño, cambiarle el pañal a un ana o estornudar. Lávese las christy antes de comer o preparar alimentos. Use harris loción para evitar que la piel se reseque o se agriete.  Lavado de christy  ¿Cuándo kalpesh buscar atención inmediata?    Foley herida se engrandece o tiene más dolor.    Usted siente sonidos crepitantes bajo la piel al tocarla.    Usted tiene puntos o protuberancias color alexander en foley piel o nota prashanth debajo foley piel.    Usted ve líneas womack saliendo del área infectada.  ¿Cuándo kalpesh llamar a mi médico?    Las áreas enrojecidas, cálidas e inflamadas se hacen más grandes.    Foley fiebre o dolor no desaparecen o empeoran.    El área no se reduce después de 3 días de uso de antibióticos.    Usted tiene preguntas o inquietudes acerca de foley condición o cuidado.  ACUERDOS SOBRE FOLEY CUIDADO:    Usted tiene el derecho de ayudar a planear foley cuidado. Aprenda todo lo que pueda sobre foley condición y juan darle tratamiento. Discuta [place] opciones de tratamiento con [place] médicos para decidir el cuidado que usted desea recibir. Usted siempre tiene el derecho de rechazar el tratamiento.

## 2024-11-11 NOTE — ED PROVIDER NOTE - CLINICAL SUMMARY MEDICAL DECISION MAKING FREE TEXT BOX
Patient is a 89-year-old female with past medical history hypertension, hyperlipidemia, glaucoma, past surgical history of right knee surgery and right breast mass s/p mastectomy presenting with 2 days of left lower extremity edema and erythema.  VSS NVI : LLE with warmth and erythema to 1st MTP, no edema, full ROM, sensation intact, 2+ pulses capillary refill < 2 seconds.  -  Physical exam concerning for cellulitis of first MTP,  will check labs, rule out electrolyte abnormalities, lactate to evaluate for end organ dysfunction, give  enteritis, ultrasound to rule out DVT, reassess.

## 2024-11-11 NOTE — ED ADULT NURSE NOTE - OBJECTIVE STATEMENT
Pt presents to ED c/o left lower extremity swelling and pain x2 days. States that it is localized to her left first toe. Denies any SOB, CP, trauma

## 2024-11-11 NOTE — ED PROVIDER NOTE - NS_EDPROVIDERDISPOUSERTYPE_ED_A_ED
Attending Attestation (For Attendings USE Only)... Daughter, Naomi, reports that she fell about a week and a half ago at [facility]./this admission

## 2024-11-11 NOTE — ED PROVIDER NOTE - PHYSICAL EXAMINATION
Gen: no acute distress  Head: normocephalic, atraumatic  Lung: CTAB, no respiratory distress, no wheezing, rales, rhonchi  CV: normal s1/s2, rrr,   Abd: soft, non-tender, non-distended  MSK: LLE with warmth and erythema to 1st MTP, no edema, full ROM, sensation intact, 2+ pulses capillary refill < 2 seconds  Neuro: No focal neurologic deficits

## 2024-11-11 NOTE — ED PROVIDER NOTE - PROGRESS NOTE DETAILS
JK - Ultrasound within normal limits, no signs of DVT.  Vital signs stable rest comfortably.  White blood cell count within normal limits.  labs otherwise significant for elevated inflammatory markers, lactic acidosis.  Lactate improving with fluids.  Well-appearing.  Ready for discharge with antibiotic prescription, close podiatry follow-up.

## 2024-11-11 NOTE — ED PROVIDER NOTE - OBJECTIVE STATEMENT
Patient is a 89-year-old female with past medical history hypertension, hyperlipidemia, glaucoma, past surgical history of right knee surgery and right breast mass s/p mastectomy presenting with 2 days of left lower extremity edema and erythema.  As per patient and granddaughter at bedside, patient has had 2 days of left foot pain, edema, erythema to the first toe.  Denies any open wounds, lacerations, drainage, fevers, trauma, falls.

## 2024-11-16 LAB
CULTURE RESULTS: SIGNIFICANT CHANGE UP
CULTURE RESULTS: SIGNIFICANT CHANGE UP
SPECIMEN SOURCE: SIGNIFICANT CHANGE UP
SPECIMEN SOURCE: SIGNIFICANT CHANGE UP

## 2025-01-27 ENCOUNTER — APPOINTMENT (OUTPATIENT)
Dept: CARDIOLOGY | Facility: CLINIC | Age: 89
End: 2025-01-27
Payer: MEDICARE

## 2025-01-27 VITALS
OXYGEN SATURATION: 97 % | BODY MASS INDEX: 25.78 KG/M2 | SYSTOLIC BLOOD PRESSURE: 178 MMHG | DIASTOLIC BLOOD PRESSURE: 84 MMHG | TEMPERATURE: 98 F | WEIGHT: 132 LBS | HEART RATE: 77 BPM

## 2025-01-27 DIAGNOSIS — R01.1 CARDIAC MURMUR, UNSPECIFIED: ICD-10-CM

## 2025-01-27 DIAGNOSIS — M31.6 OTHER GIANT CELL ARTERITIS: ICD-10-CM

## 2025-01-27 DIAGNOSIS — Z13.6 ENCOUNTER FOR SCREENING FOR CARDIOVASCULAR DISORDERS: ICD-10-CM

## 2025-01-27 DIAGNOSIS — I05.0 RHEUMATIC MITRAL STENOSIS: ICD-10-CM

## 2025-01-27 DIAGNOSIS — I10 ESSENTIAL (PRIMARY) HYPERTENSION: ICD-10-CM

## 2025-01-27 DIAGNOSIS — E78.00 PURE HYPERCHOLESTEROLEMIA, UNSPECIFIED: ICD-10-CM

## 2025-01-27 PROCEDURE — G2211 COMPLEX E/M VISIT ADD ON: CPT

## 2025-01-27 PROCEDURE — 93000 ELECTROCARDIOGRAM COMPLETE: CPT

## 2025-01-27 PROCEDURE — 99214 OFFICE O/P EST MOD 30 MIN: CPT

## 2025-03-09 ENCOUNTER — INPATIENT (INPATIENT)
Facility: HOSPITAL | Age: 89
LOS: 2 days | Discharge: HOME | DRG: 948 | End: 2025-03-12
Attending: INTERNAL MEDICINE | Admitting: INTERNAL MEDICINE
Payer: MEDICARE

## 2025-03-09 VITALS
RESPIRATION RATE: 24 BRPM | DIASTOLIC BLOOD PRESSURE: 85 MMHG | WEIGHT: 132.06 LBS | OXYGEN SATURATION: 100 % | HEIGHT: 60 IN | HEART RATE: 70 BPM | TEMPERATURE: 98 F | SYSTOLIC BLOOD PRESSURE: 215 MMHG

## 2025-03-09 DIAGNOSIS — R79.89 OTHER SPECIFIED ABNORMAL FINDINGS OF BLOOD CHEMISTRY: ICD-10-CM

## 2025-03-09 DIAGNOSIS — I16.0 HYPERTENSIVE URGENCY: ICD-10-CM

## 2025-03-09 DIAGNOSIS — Z29.9 ENCOUNTER FOR PROPHYLACTIC MEASURES, UNSPECIFIED: ICD-10-CM

## 2025-03-09 DIAGNOSIS — K21.9 GASTRO-ESOPHAGEAL REFLUX DISEASE WITHOUT ESOPHAGITIS: ICD-10-CM

## 2025-03-09 DIAGNOSIS — Z96.659 PRESENCE OF UNSPECIFIED ARTIFICIAL KNEE JOINT: Chronic | ICD-10-CM

## 2025-03-09 DIAGNOSIS — L30.9 DERMATITIS, UNSPECIFIED: ICD-10-CM

## 2025-03-09 DIAGNOSIS — E78.5 HYPERLIPIDEMIA, UNSPECIFIED: ICD-10-CM

## 2025-03-09 DIAGNOSIS — Z90.49 ACQUIRED ABSENCE OF OTHER SPECIFIED PARTS OF DIGESTIVE TRACT: Chronic | ICD-10-CM

## 2025-03-09 DIAGNOSIS — H40.9 UNSPECIFIED GLAUCOMA: ICD-10-CM

## 2025-03-09 DIAGNOSIS — Z98.890 OTHER SPECIFIED POSTPROCEDURAL STATES: Chronic | ICD-10-CM

## 2025-03-09 DIAGNOSIS — E87.6 HYPOKALEMIA: ICD-10-CM

## 2025-03-09 LAB
ALBUMIN SERPL ELPH-MCNC: 3.7 G/DL — SIGNIFICANT CHANGE UP (ref 3.5–5)
ALP SERPL-CCNC: 75 U/L — SIGNIFICANT CHANGE UP (ref 40–120)
ALT FLD-CCNC: 18 U/L DA — SIGNIFICANT CHANGE UP (ref 10–60)
ANION GAP SERPL CALC-SCNC: 7 MMOL/L — SIGNIFICANT CHANGE UP (ref 5–17)
ANION GAP SERPL CALC-SCNC: 9 MMOL/L — SIGNIFICANT CHANGE UP (ref 5–17)
AST SERPL-CCNC: 16 U/L — SIGNIFICANT CHANGE UP (ref 10–40)
BASOPHILS # BLD AUTO: 0.03 K/UL — SIGNIFICANT CHANGE UP (ref 0–0.2)
BASOPHILS NFR BLD AUTO: 0.3 % — SIGNIFICANT CHANGE UP (ref 0–2)
BILIRUB SERPL-MCNC: 0.4 MG/DL — SIGNIFICANT CHANGE UP (ref 0.2–1.2)
BUN SERPL-MCNC: 10 MG/DL — SIGNIFICANT CHANGE UP (ref 7–18)
BUN SERPL-MCNC: 8 MG/DL — SIGNIFICANT CHANGE UP (ref 7–18)
CALCIUM SERPL-MCNC: 9.2 MG/DL — SIGNIFICANT CHANGE UP (ref 8.4–10.5)
CALCIUM SERPL-MCNC: 9.5 MG/DL — SIGNIFICANT CHANGE UP (ref 8.4–10.5)
CHLORIDE SERPL-SCNC: 103 MMOL/L — SIGNIFICANT CHANGE UP (ref 96–108)
CHLORIDE SERPL-SCNC: 109 MMOL/L — HIGH (ref 96–108)
CO2 SERPL-SCNC: 21 MMOL/L — LOW (ref 22–31)
CO2 SERPL-SCNC: 25 MMOL/L — SIGNIFICANT CHANGE UP (ref 22–31)
CREAT SERPL-MCNC: 0.79 MG/DL — SIGNIFICANT CHANGE UP (ref 0.5–1.3)
CREAT SERPL-MCNC: 0.81 MG/DL — SIGNIFICANT CHANGE UP (ref 0.5–1.3)
EGFR: 69 ML/MIN/1.73M2 — SIGNIFICANT CHANGE UP
EGFR: 69 ML/MIN/1.73M2 — SIGNIFICANT CHANGE UP
EGFR: 71 ML/MIN/1.73M2 — SIGNIFICANT CHANGE UP
EGFR: 71 ML/MIN/1.73M2 — SIGNIFICANT CHANGE UP
EOSINOPHIL # BLD AUTO: 0.05 K/UL — SIGNIFICANT CHANGE UP (ref 0–0.5)
EOSINOPHIL NFR BLD AUTO: 0.5 % — SIGNIFICANT CHANGE UP (ref 0–6)
FLUAV AG NPH QL: SIGNIFICANT CHANGE UP
FLUBV AG NPH QL: SIGNIFICANT CHANGE UP
GLUCOSE SERPL-MCNC: 139 MG/DL — HIGH (ref 70–99)
GLUCOSE SERPL-MCNC: 164 MG/DL — HIGH (ref 70–99)
HCT VFR BLD CALC: 37.4 % — SIGNIFICANT CHANGE UP (ref 34.5–45)
HGB BLD-MCNC: 12.5 G/DL — SIGNIFICANT CHANGE UP (ref 11.5–15.5)
IMM GRANULOCYTES NFR BLD AUTO: 0.4 % — SIGNIFICANT CHANGE UP (ref 0–0.9)
LIDOCAIN IGE QN: 70 U/L — SIGNIFICANT CHANGE UP (ref 13–75)
LYMPHOCYTES # BLD AUTO: 2.28 K/UL — SIGNIFICANT CHANGE UP (ref 1–3.3)
LYMPHOCYTES # BLD AUTO: 24.9 % — SIGNIFICANT CHANGE UP (ref 13–44)
MAGNESIUM SERPL-MCNC: 1.7 MG/DL — SIGNIFICANT CHANGE UP (ref 1.6–2.6)
MCHC RBC-ENTMCNC: 30.3 PG — SIGNIFICANT CHANGE UP (ref 27–34)
MCHC RBC-ENTMCNC: 33.4 G/DL — SIGNIFICANT CHANGE UP (ref 32–36)
MCV RBC AUTO: 90.8 FL — SIGNIFICANT CHANGE UP (ref 80–100)
MONOCYTES # BLD AUTO: 0.92 K/UL — HIGH (ref 0–0.9)
MONOCYTES NFR BLD AUTO: 10.1 % — SIGNIFICANT CHANGE UP (ref 2–14)
NEUTROPHILS # BLD AUTO: 5.82 K/UL — SIGNIFICANT CHANGE UP (ref 1.8–7.4)
NEUTROPHILS NFR BLD AUTO: 63.8 % — SIGNIFICANT CHANGE UP (ref 43–77)
NRBC BLD AUTO-RTO: 0 /100 WBCS — SIGNIFICANT CHANGE UP (ref 0–0)
PHOSPHATE SERPL-MCNC: 1.2 MG/DL — LOW (ref 2.5–4.5)
PLATELET # BLD AUTO: 276 K/UL — SIGNIFICANT CHANGE UP (ref 150–400)
POTASSIUM SERPL-MCNC: 2.7 MMOL/L — CRITICAL LOW (ref 3.5–5.3)
POTASSIUM SERPL-MCNC: 5 MMOL/L — SIGNIFICANT CHANGE UP (ref 3.5–5.3)
POTASSIUM SERPL-SCNC: 2.7 MMOL/L — CRITICAL LOW (ref 3.5–5.3)
POTASSIUM SERPL-SCNC: 5 MMOL/L — SIGNIFICANT CHANGE UP (ref 3.5–5.3)
PROT SERPL-MCNC: 7.2 G/DL — SIGNIFICANT CHANGE UP (ref 6–8.3)
RBC # BLD: 4.12 M/UL — SIGNIFICANT CHANGE UP (ref 3.8–5.2)
RBC # FLD: 12.9 % — SIGNIFICANT CHANGE UP (ref 10.3–14.5)
RSV RNA NPH QL NAA+NON-PROBE: SIGNIFICANT CHANGE UP
SARS-COV-2 RNA SPEC QL NAA+PROBE: SIGNIFICANT CHANGE UP
SODIUM SERPL-SCNC: 137 MMOL/L — SIGNIFICANT CHANGE UP (ref 135–145)
SODIUM SERPL-SCNC: 137 MMOL/L — SIGNIFICANT CHANGE UP (ref 135–145)
TROPONIN I, HIGH SENSITIVITY RESULT: 228.3 NG/L — HIGH
TROPONIN I, HIGH SENSITIVITY RESULT: 242.9 NG/L — HIGH
WBC # BLD: 9.14 K/UL — SIGNIFICANT CHANGE UP (ref 3.8–10.5)
WBC # FLD AUTO: 9.14 K/UL — SIGNIFICANT CHANGE UP (ref 3.8–10.5)

## 2025-03-09 PROCEDURE — 93010 ELECTROCARDIOGRAM REPORT: CPT

## 2025-03-09 PROCEDURE — 99285 EMERGENCY DEPT VISIT HI MDM: CPT

## 2025-03-09 PROCEDURE — 71046 X-RAY EXAM CHEST 2 VIEWS: CPT | Mod: 26

## 2025-03-09 RX ORDER — ACETAMINOPHEN 500 MG/5ML
650 LIQUID (ML) ORAL EVERY 6 HOURS
Refills: 0 | Status: DISCONTINUED | OUTPATIENT
Start: 2025-03-09 | End: 2025-03-12

## 2025-03-09 RX ORDER — ASPIRIN 325 MG
81 TABLET ORAL ONCE
Refills: 0 | Status: COMPLETED | OUTPATIENT
Start: 2025-03-09 | End: 2025-03-09

## 2025-03-09 RX ORDER — DEXAMETHASONE 0.5 MG/1
6 TABLET ORAL ONCE
Refills: 0 | Status: COMPLETED | OUTPATIENT
Start: 2025-03-09 | End: 2025-03-09

## 2025-03-09 RX ORDER — AMLODIPINE BESYLATE 10 MG/1
5 TABLET ORAL DAILY
Refills: 0 | Status: DISCONTINUED | OUTPATIENT
Start: 2025-03-09 | End: 2025-03-12

## 2025-03-09 RX ORDER — ENOXAPARIN SODIUM 100 MG/ML
40 INJECTION SUBCUTANEOUS EVERY 24 HOURS
Refills: 0 | Status: DISCONTINUED | OUTPATIENT
Start: 2025-03-09 | End: 2025-03-12

## 2025-03-09 RX ORDER — ONDANSETRON HCL/PF 4 MG/2 ML
4 VIAL (ML) INJECTION EVERY 8 HOURS
Refills: 0 | Status: DISCONTINUED | OUTPATIENT
Start: 2025-03-09 | End: 2025-03-12

## 2025-03-09 RX ORDER — LOSARTAN POTASSIUM 100 MG/1
50 TABLET, FILM COATED ORAL ONCE
Refills: 0 | Status: COMPLETED | OUTPATIENT
Start: 2025-03-09 | End: 2025-03-09

## 2025-03-09 RX ORDER — KETOROLAC TROMETHAMINE 30 MG/ML
15 INJECTION, SOLUTION INTRAMUSCULAR; INTRAVENOUS ONCE
Refills: 0 | Status: DISCONTINUED | OUTPATIENT
Start: 2025-03-09 | End: 2025-03-09

## 2025-03-09 RX ADMIN — Medication 40 MILLIEQUIVALENT(S): at 19:26

## 2025-03-09 RX ADMIN — KETOROLAC TROMETHAMINE 15 MILLIGRAM(S): 30 INJECTION, SOLUTION INTRAMUSCULAR; INTRAVENOUS at 19:07

## 2025-03-09 RX ADMIN — AMLODIPINE BESYLATE 5 MILLIGRAM(S): 10 TABLET ORAL at 23:36

## 2025-03-09 RX ADMIN — LOSARTAN POTASSIUM 50 MILLIGRAM(S): 100 TABLET, FILM COATED ORAL at 20:09

## 2025-03-09 RX ADMIN — Medication 100 MILLIEQUIVALENT(S): at 19:26

## 2025-03-09 RX ADMIN — KETOROLAC TROMETHAMINE 15 MILLIGRAM(S): 30 INJECTION, SOLUTION INTRAMUSCULAR; INTRAVENOUS at 18:37

## 2025-03-09 RX ADMIN — Medication 100 MILLIEQUIVALENT(S): at 20:33

## 2025-03-09 RX ADMIN — Medication 100 MILLIEQUIVALENT(S): at 22:00

## 2025-03-09 RX ADMIN — Medication 81 MILLIGRAM(S): at 20:10

## 2025-03-09 RX ADMIN — DEXAMETHASONE 6 MILLIGRAM(S): 0.5 TABLET ORAL at 18:37

## 2025-03-09 NOTE — H&P ADULT - PROBLEM SELECTOR PLAN 2
p/w serum potassium 2.7   -likely 2/2 poor PO intake and HCTZ  -s/p KCl 40meq PO x1 and 10meq IV x3   -repeat potassium 5  -monitor BMP  -replace K PRN

## 2025-03-09 NOTE — ED ADULT NURSE NOTE - NS ED NURSE LEVEL OF CONSCIOUSNESS SPEECH
Name: Neida Hernandez KEVIN    Relationship: Self    Best Callback Number: [phone]     HUB PROVIDED THE RELAY MESSAGE FROM THE OFFICE   PATIENT VOICED UNDERSTANDING AND HAS NO FURTHER QUESTIONS AT THIS TIME    ADDITIONAL INFORMATION:    Speaking Coherently

## 2025-03-09 NOTE — ED ADULT NURSE NOTE - NSFALLHARMRISKINTERV_ED_ALL_ED

## 2025-03-09 NOTE — H&P ADULT - PROBLEM SELECTOR PLAN 3
complaining of several days of sore throat, dry cough, nasal congestion, and left ear pain  -Flu/COVID/RSV PCR negative  -suspect viral URI  -supportive care  -start flonase BID  -benzocaine lozenge PRN  -PPI for possible component of GERD

## 2025-03-09 NOTE — H&P ADULT - NSHPPHYSICALEXAM_GEN_ALL_CORE
LOS:     VITALS:   T(C): 36.6 (03-09-25 @ 20:00), Max: 36.6 (03-09-25 @ 17:47)  HR: 70 (03-09-25 @ 20:38) (67 - 70)  BP: 189/79 (03-09-25 @ 20:00) (189/79 - 215/85)  RR: 20 (03-09-25 @ 20:00) (20 - 24)  SpO2: 99% (03-09-25 @ 20:00) (99% - 100%)    GENERAL: NAD, lying in bed comfortably  HEAD:  Atraumatic, Normocephalic  EYES: EOMI, PERRLA, conjunctiva and sclera clear  ENT: Moist mucous membranes  NECK: Supple, No JVD  CHEST/LUNG: Clear to auscultation bilaterally; No rales, rhonchi, wheezing, or rubs. Unlabored respirations  HEART: Regular rate and rhythm; No murmurs, rubs, or gallops  ABDOMEN: BSx4; Soft, nontender, nondistended  EXTREMITIES:  2+ Peripheral Pulses, brisk capillary refill. No clubbing, cyanosis, or edema  NERVOUS SYSTEM:  A&Ox3, no focal deficits   SKIN: No rashes or lesions LOS:     VITALS:   T(C): 36.6 (03-09-25 @ 20:00), Max: 36.6 (03-09-25 @ 17:47)  HR: 70 (03-09-25 @ 20:38) (67 - 70)  BP: 189/79 (03-09-25 @ 20:00) (189/79 - 215/85)  RR: 20 (03-09-25 @ 20:00) (20 - 24)  SpO2: 99% (03-09-25 @ 20:00) (99% - 100%)    GENERAL: NAD, lying in bed comfortably  HEAD:  Atraumatic, Normocephalic  EYES: EOMI, PERRLA, conjunctiva and sclera clear  ENT: Moist mucous membranes, mild posterior pharygeal erythema  NECK: Supple, No JVD  CHEST/LUNG: Clear to auscultation bilaterally; No rales, rhonchi, wheezing, or rubs. Unlabored respirations  HEART: Regular rate and rhythm; systolic murmur heard best at RUSB, no rubs, or gallops  ABDOMEN: BSx4; Soft, nontender, nondistended  EXTREMITIES:  2+ Peripheral Pulses, brisk capillary refill. No clubbing, cyanosis, localized edema and TTP at R knee  NERVOUS SYSTEM:  A&Ox3, no focal deficits   SKIN: erythematous scaly rash over forehead along hair line, no lesions

## 2025-03-09 NOTE — ED PROVIDER NOTE - INTERNATIONAL TRAVEL
No Admit: 11/21/17  Dx: OA L Hip  Procedure: L LONNIE  Surgeon: Fifi Melendez MD     Chief Complaint/Reason for Visit:  Chief Complaint/Reason for Admission	hip replacement	     History of Present Illness:  History of Present Illness		  74 yr old female with history of HTN, hyperlipidemia, Obesity,  Rheumatoid arthritis, Osteoarthritis , peripheral neuropathy , DVT (1999- After ORIF of right femur) & (2017 Jan - left lower extremity below knee ) on coumadin  - with pain in left leg for almost 2 years, limiting movement , had X-ray of hip- severe OA , Now coming in for Left anterior total hip replacement on 11/21/17.     Allergies/Medications:   Allergies:        Allergies:  	iodine containing compounds: Drug Category, Hives, Angioedema  	corticosteroids: Drug Category, Angioedema, cortisone shots  	shellfish: Food, Angioedema, shrimp  	dust: Miscellaneous, Sneezing    Home Medications:   * Patient Currently Takes Medications as of 06-Nov-2017 09:17 documented in Structured Notes  · 	Synthroid 88 mcg (0.088 mg) oral tablet: Last Dose Taken:  , 1 tab(s) orally once a day  · 	PriLOSEC 40 mg oral delayed release capsule: Last Dose Taken:  , 1 cap(s) orally once a day  · 	Cozaar 50 mg oral tablet: Last Dose Taken:  , 1 tab(s) orally once a day  · 	gabapentin 300 mg oral capsule: Last Dose Taken:  , 1 cap(s) orally once a day  · 	hydroCHLOROthiazide 12.5 mg oral capsule: Last Dose Taken:  , 1 cap(s) orally once a day  · 	traMADol 50 mg oral tablet: Last Dose Taken:  , 1 tab(s) orally every 8 hours, As Needed - for moderate pain  · 	warfarin 5 mg oral tablet: Last Dose Taken:  , 1 tab(s) orally once a day  · 	Multiple Vitamins oral capsule: Last Dose Taken:  , 1 cap(s) orally once a day  · 	CoQ10 300 mg oral capsule: Last Dose Taken:  , 1 cap(s) orally once a day  · 	Fish Oil 1200 mg oral capsule: Last Dose Taken:  , 1 cap(s) orally once a day    PMH/PSH/FH/SH:    Past Medical History:  Acute deep vein thrombosis (DVT) of iliac vein of left lower extremity  Jan 2017-On coumadin  Acute deep vein thrombosis (DVT) of iliac vein of right lower extremity  1999 after ORIF of femur- was on coumadin for 10 m ths  GERD (gastroesophageal reflux disease)    Hiatal hernia    Hypertension, unspecified type    Hypothyroidism, unspecified type    Osteoarthritis    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     Past Surgical History:  S/P carpal tunnel release  left  S/P cholecystectomy    S/P hip replacement, right  2009  S/P hysterectomy  no oophorectomy  S/P ORIF (open reduction internal fixation) fracture  right femur- 1999-removal of hardware 2008  Status post left knee replacement  2014    Hospital Course:  Pt is a 75 y/o female admitted to [facility] on 11/21 for elective L LONNIE.  Intraoperative hip fracture fixed with cerclage wire and patient made PWB LLE with hip abduction precautions.  Pt transferred to [address] [place] [address] for further orthopaedic care.  Physical therapy evaluated the patient and recommended subacute rehab for discharge disposition.  Pt stable for discharge on 11/24. Admit: 11/21/17  Dx: OA L Hip  Procedure: L LONNIE  Surgeon: Fifi Melendez MD     Chief Complaint/Reason for Visit:  Chief Complaint/Reason for Admission	hip replacement	     History of Present Illness:  History of Present Illness		  74 yr old female with history of HTN, hyperlipidemia, Obesity,  Rheumatoid arthritis, Osteoarthritis , peripheral neuropathy , DVT (1999- After ORIF of right femur) & (2017 Jan - left lower extremity below knee ) on coumadin  - with pain in left leg for almost 2 years, limiting movement , had X-ray of hip- severe OA , Now coming in for Left anterior total hip replacement on 11/21/17.     Allergies/Medications:   Allergies:        Allergies:  	iodine containing compounds: Drug Category, Hives, Angioedema  	corticosteroids: Drug Category, Angioedema, cortisone shots  	shellfish: Food, Angioedema, shrimp  	dust: Miscellaneous, Sneezing    Home Medications:   * Patient Currently Takes Medications as of 06-Nov-2017 09:17 documented in Structured Notes  · 	Synthroid 88 mcg (0.088 mg) oral tablet: Last Dose Taken:  , 1 tab(s) orally once a day  · 	PriLOSEC 40 mg oral delayed release capsule: Last Dose Taken:  , 1 cap(s) orally once a day  · 	Cozaar 50 mg oral tablet: Last Dose Taken:  , 1 tab(s) orally once a day  · 	gabapentin 300 mg oral capsule: Last Dose Taken:  , 1 cap(s) orally once a day  · 	hydroCHLOROthiazide 12.5 mg oral capsule: Last Dose Taken:  , 1 cap(s) orally once a day  · 	traMADol 50 mg oral tablet: Last Dose Taken:  , 1 tab(s) orally every 8 hours, As Needed - for moderate pain  · 	warfarin 5 mg oral tablet: Last Dose Taken:  , 1 tab(s) orally once a day  · 	Multiple Vitamins oral capsule: Last Dose Taken:  , 1 cap(s) orally once a day  · 	CoQ10 300 mg oral capsule: Last Dose Taken:  , 1 cap(s) orally once a day  · 	Fish Oil 1200 mg oral capsule: Last Dose Taken:  , 1 cap(s) orally once a day    PMH/PSH/FH/SH:    Past Medical History:  Acute deep vein thrombosis (DVT) of iliac vein of left lower extremity  Jan 2017-On coumadin  Acute deep vein thrombosis (DVT) of iliac vein of right lower extremity  1999 after ORIF of femur- was on coumadin for 10 m ths  GERD (gastroesophageal reflux disease)    Hiatal hernia    Hypertension, unspecified type    Hypothyroidism, unspecified type    Osteoarthritis    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     Past Surgical History:  S/P carpal tunnel release  left  S/P cholecystectomy    S/P hip replacement, right  2009  S/P hysterectomy  no oophorectomy  S/P ORIF (open reduction internal fixation) fracture  right femur- 1999-removal of hardware 2008  Status post left knee replacement  2014    Hospital Course:  Pt is a 73 y/o female admitted to [facility] on 11/21 for elective L LONNIE.  Intraoperative hip fracture fixed with cerclage wire and patient made PWB LLE with hip abduction precautions.  Pt transferred to [address] [place] [address] for further orthopaedic care.  Physical therapy evaluated the patient and recommended subacute rehab for discharge disposition.  Pt stable for discharge on 11/28/17.

## 2025-03-09 NOTE — ED PROVIDER NOTE - PROGRESS NOTE DETAILS
restrepo: sleeping comfortably. pt trop elevated. K low. replacement ordered. trop high possibly demand. no active cp or ekg concerning for ischemia. admit  pt forgot to take losartan 50mg today

## 2025-03-09 NOTE — ED PROVIDER NOTE - CLINICAL SUMMARY MEDICAL DECISION MAKING FREE TEXT BOX
89 yr old female with hx of cholecystectomy, b/l tkr, b/l mastectomy, HTN, HLD presents to ed c/o throat pain, sneezing, cough, nausea, ear pain, headache and mid abd pain x 3 days. no dysuria, normal bm, no rash, no sick contact. pt didn't take any meds today. also c/o itchy scalp and redness which pt has seen multiple derm and tried meds.    uri r/o pna less likely acs. labs, ekg, cxr, meds, re-assess

## 2025-03-09 NOTE — H&P ADULT - ASSESSMENT
89y F with PMH of HTN, HLD, Breast CA s/p R mastectomy, Glaucoma, Calculus of bile duct s/p ERCP, Vertigo, Migraine, Renal cyst, Fatty liver, and GERD p/w sore throat, nasal congestion, cough, and epigastric pain.  Detail Level: Simple

## 2025-03-09 NOTE — ED ADULT NURSE NOTE - VOIDING
----- Message from Mir Braxton MD sent at 5/23/2019  8:04 AM CDT -----  Please let her know her urine culture showed a UTI. She should continue taking Macrobid as previously prescribed.  Mir Braxton MD     without difficulty

## 2025-03-09 NOTE — H&P ADULT - ATTENDING COMMENTS
89y F with PMH of HTN, HLD, Breast CA s/p R mastectomy, Glaucoma, Calculus of bile duct s/p ERCP, Vertigo, Migraine, Renal cyst, Fatty liver, and GERD p/w sore throat, nasal congestion, cough, and epigastric pain.      FluA/FluB/RSV/COVID PCR (03.09.25 @ 18:02)   SARS-CoV-2 Result: NotDetec:   Influenza A Result: NotDetec  Influenza B Result: NotDetec  Resp Syn Virus Result: NotDetec      assessment   --- uncontrolled htn with urgency, troponinemia.  r/o acs, hypokalemia, uri, sore throat, h/o HTN, HLD, Breast CA s/p R mastectomy, Glaucoma, Calculus of bile duct s/p ERCP, Vertigo, Migraine, Renal cyst, Fatty liver, GERD    plan  --  adm to tele, acs protocol, lopressor, aspirin, statin, augmentin 875 bid x 7 days, cepacol yefri, cont preadmit home meds, gi and dvt prophylaxis, supplement potassium   cbc, bmp, mg, phos, lipid, tsh, ce q8 x3    echo    cardio cons

## 2025-03-09 NOTE — H&P ADULT - HISTORY OF PRESENT ILLNESS
Patient currently stable. S/P 1 unit prbc  Pulse 70's  normal BP's  still has some vaginal bleeding.  Syncope and fall most likel;y related to blood loss  Work up by IM  Probable Adenomyosis, but will need endometrial sampling prior to definitive surgey 89y F with PMH of HTN, HLD, Breast CA s/p R mastectomy, Glaucoma, Calculus of bile duct s/p ERCP, Vertigo, Migraine, Renal cyst, Fatty liver, and GERD p/w sore throat, nasal congestion, cough, and epigastric pain.       In the ED:  T(F): , Max: 97.9 (17:47); HR:  (67 - 70); BP:  (189/79 - 215/85); RR:  (20 - 24); SpO2:  (99% - 100%)  WBC:9.14, Hb.5, PLT:276  Na:137, K:2.7, Cl:103, HCO3:25, BUN:10, sCr:0.81  AST:16, ALT:18, ALP:75, Tbili:0.4, Lipase:70   Troponin:228.3  s/p aspirin  chewable 81 milliGRAM(s); dexAMETHasone     Tablet 6 milliGRAM(s); ketorolac   Injectable 15 milliGRAM(s); losartan 50 milliGRAM(s); potassium chloride   Powder 40 milliEquivalent(s); potassium chloride  10 mEq/100 mL IVPB 10 milliEquivalent(s) 89y F, from home, ambulates with cane/walker, with PMH of HTN, HLD, Breast CA s/p R mastectomy, Glaucoma, Calculus of bile duct s/p ERCP, Vertigo, Migraine, Renal cyst, Fatty liver, and GERD p/w several days of sore throat, nasal congestion, R ear pain, dry cough, and epigastric pain. Reports decreased appetite and decreased PO intake over the last 3 days. Patient states she did not take her home BP medication prior to coming to ED. Reports some nausea this AM but denies vomiting. Granddaughter at bedside reports that patient has very sensitive stomach and has gone over list of foods to avoid with her PCP. Patient also reports frequent burping. Complains of sneezing and subsequent flatus whenever she sneezes recently. Denies fecal incontinence. Reports normal and regular BMs with last BM yesterday afternoon. Complains of painful itchy rash on scalp that frequently flares when sick or under stress. Follows with dermatologist who has prescribed multiple creams and shampoos with limited efficacy. Patient reports fall 3 days prior to admission and injury to R knee which is the knee she had replaced. States that since fall walking has been more difficult. Denies fever, chills, headache, dizziness, chest pain, palpitations, shortness of breath, vomiting, diarrhea, constipation, and dysuria.        In the ED:  T(F): , Max: 97.9 (17:47); HR:  (67 - 70); BP:  (189/79 - 215/85); RR:  (20 - 24); SpO2:  (99% - 100%)  WBC:9.14, Hb.5, PLT:276  Na:137, K:2.7, Cl:103, HCO3:25, BUN:10, sCr:0.81  AST:16, ALT:18, ALP:75, Tbili:0.4, Lipase:70   Troponin:228.3  s/p aspirin  chewable 81 milliGRAM(s); dexAMETHasone     Tablet 6 milliGRAM(s); ketorolac   Injectable 15 milliGRAM(s); losartan 50 milliGRAM(s); potassium chloride   Powder 40 milliEquivalent(s); potassium chloride  10 mEq/100 mL IVPB 10 milliEquivalent(s)

## 2025-03-09 NOTE — H&P ADULT - PROBLEM SELECTOR PLAN 1
hx of HTN on HCTZ 25mg, Losartan 100mg, and amlodipine 5mg at home presenting to the ED reporting missing doses of home meds  -/79 - 215/85 on admission  -s/p losartan 50mg and amlodipine 5mg in ED with improvement in BP to 167/64

## 2025-03-09 NOTE — H&P ADULT - PROBLEM SELECTOR PLAN 4
hx of GERD not on PPI or H2 blocker  -reports very sensitive stomach in response to certain foods currently complaining of epigastric pain, frequent burping, and sore throat with dry cough  -suspect some URI symptoms may be confounded by untreated GERD  -start protonix 40mg PO BID

## 2025-03-09 NOTE — ED ADULT NURSE NOTE - TEMPLATE LIST FOR HEAD TO TOE ASSESSMENT
Progress Note - [address] 62 y o  male MRN: [id number]   Unit/Bed#: [facility] 251-02 Encounter: [id number]    Behavior over the last 24 hours:      Deejay Wolf was seen for an inpatient follow-up psychiatric visit this date  He relates he is feeling well and is taking his medications as prescribed without side effects  His mood has improved and his affect is bright  His appetite and sleep have also improved  He is pleasant, social, and appropriate in the milieu  He is looking forward to being discharged and plans to return home once released  He offers no complaints at this time  ROS: no complaints, all other systems are negative    Mental Status Evaluation:    Appearance:  casually dressed, dressed appropriately   Behavior:  pleasant, cooperative, calm   Speech:  normal rate and volume   Mood:  improved   Affect:  normal range and intensity   Thought Process:  organized, logical, coherent   Associations: intact associations   Thought Content:  normal, no overt delusions   Perceptual Disturbances: none   Risk Potential: Suicidal ideation - None  Homicidal ideation - None  Potential for aggression - No   Sensorium:  oriented to person, place and time/date   Memory:  recent and remote memory grossly intact   Consciousness:  alert and awake   Attention: attention span and concentration are age appropriate   Insight:  fair   Judgment: fair   Gait/Station: normal gait/station, normal balance   Motor Activity: no abnormal movements     Vital signs in last 24 hours:    Temp:  [97 6 °F (36 4 °C)-99 4 °F (37 4 °C)] 97 6 °F (36 4 °C)  HR:  [] 90  Resp:  [18] 18  BP: (123-127)/(88-97) 123/88    Laboratory results:  I have personally reviewed all pertinent laboratory/tests results      Progress Toward Goals: progressing    Assessment/Plan   Principal Problem:    MDD (major depressive disorder), recurrent episode, moderate (HCC)  Active Problems:    Medical clearance for psychiatric admission Alcohol abuse    Right wrist pain    Nicotine abuse    Alcohol use disorder, moderate, dependence (HCC)    Recommended Treatment:     Continue current medications as prescribed  Continue to monitor  Plan is for discharge Monday pending any change in patient's status  All current active medications have been reviewed  Encourage group therapy, milieu therapy and occupational therapy  Behavioral Health checks every 7 minutes    Current Facility-Administered Medications   Medication Dose Route Frequency Provider Last Rate    acetaminophen  650 mg Oral Q6H PRN Jeanine Gusman MD      escitalopram  5 mg Oral Daily Jeanine Gusman MD      folic acid  1 mg Oral Daily Delvis Loud, PA-C      gabapentin  300 mg Oral TID Jeanine Gusman MD      haloperidol lactate  5 mg Intramuscular Q6H PRN Jeanine Gusman MD      hydrOXYzine HCL  50 mg Oral Q4H PRN Jeanine Gusman MD      ibuprofen  600 mg Oral Q8H PRN Jeanine Gusman MD      lidocaine  1 patch Topical Daily Delvis Loud, PA-C      LORazepam  2 mg Intramuscular Q4H PRN Jeanine Gusman MD      LORazepam  2 mg Oral Q8H PRN Jeanine Gusman MD      multivitamin-minerals  1 tablet Oral Daily Delvis Loud, PA-C      nicotine  21 mg Transdermal HS Delvis Loud, PA-C      pneumococcal 23-valent polysaccharide vaccine  0 5 mL Subcutaneous Prior to discharge Jeanine Gusman MD      risperiDONE  2 mg Oral Q3H PRN Jeanine Gusman MD      thiamine  100 mg Oral Daily Delvis Loud, PA-C      traZODone  50 mg Oral HS PRN Jeanine Gusman MD         Risks / Benefits of Treatment:    Risks, benefits, and possible side effects of medications explained to patient and patient verbalizes understanding and agreement for treatment  Counseling / Coordination of Care:      Patient's progress discussed with staff in treatment team meeting  Medications, treatment progress and treatment plan reviewed with patient  General

## 2025-03-09 NOTE — H&P ADULT - PROBLEM SELECTOR PLAN 5
h/o HLD on atorvastatin  -c/w home med  -f/u lipid profile  -calculate ASCVD risk  -DASH diet long history of dermatitis mainly on scalp following with dermatology s/p multiple trials of various topical steroids, antifungal shampoos, and topical antibiotics  -start topical triamcinalone cream to scalp BID

## 2025-03-09 NOTE — ED PROVIDER NOTE - OBJECTIVE STATEMENT
89 yr old female with hx of cholecystectomy, b/l tkr, b/l mastectomy, HTN, HLD presents to ed c/o throat pain, sneezing, cough, nausea, ear pain, headache and mid abd pain x 3 days. no dysuria, normal bm, no rash, no sick contact. pt didn't take any meds today. also c/o itchy scalp and redness which pt has seen multiple derm and tried meds.

## 2025-03-10 ENCOUNTER — RESULT REVIEW (OUTPATIENT)
Age: 89
End: 2025-03-10

## 2025-03-10 DIAGNOSIS — R09.89 OTHER SPECIFIED SYMPTOMS AND SIGNS INVOLVING THE CIRCULATORY AND RESPIRATORY SYSTEMS: ICD-10-CM

## 2025-03-10 DIAGNOSIS — R79.89 OTHER SPECIFIED ABNORMAL FINDINGS OF BLOOD CHEMISTRY: ICD-10-CM

## 2025-03-10 DIAGNOSIS — N39.0 URINARY TRACT INFECTION, SITE NOT SPECIFIED: ICD-10-CM

## 2025-03-10 DIAGNOSIS — J06.9 ACUTE UPPER RESPIRATORY INFECTION, UNSPECIFIED: ICD-10-CM

## 2025-03-10 LAB
A1C WITH ESTIMATED AVERAGE GLUCOSE RESULT: 5.5 % — SIGNIFICANT CHANGE UP (ref 4–5.6)
ALBUMIN SERPL ELPH-MCNC: 3.5 G/DL — SIGNIFICANT CHANGE UP (ref 3.5–5)
ALP SERPL-CCNC: 80 U/L — SIGNIFICANT CHANGE UP (ref 40–120)
ALT FLD-CCNC: 18 U/L DA — SIGNIFICANT CHANGE UP (ref 10–60)
ANION GAP SERPL CALC-SCNC: 6 MMOL/L — SIGNIFICANT CHANGE UP (ref 5–17)
ANISOCYTOSIS BLD QL: SLIGHT — SIGNIFICANT CHANGE UP
AST SERPL-CCNC: 13 U/L — SIGNIFICANT CHANGE UP (ref 10–40)
BASOPHILS # BLD AUTO: 0 K/UL — SIGNIFICANT CHANGE UP (ref 0–0.2)
BASOPHILS NFR BLD AUTO: 0 % — SIGNIFICANT CHANGE UP (ref 0–2)
BILIRUB SERPL-MCNC: 0.3 MG/DL — SIGNIFICANT CHANGE UP (ref 0.2–1.2)
BUN SERPL-MCNC: 9 MG/DL — SIGNIFICANT CHANGE UP (ref 7–18)
CALCIUM SERPL-MCNC: 9.2 MG/DL — SIGNIFICANT CHANGE UP (ref 8.4–10.5)
CHLORIDE SERPL-SCNC: 106 MMOL/L — SIGNIFICANT CHANGE UP (ref 96–108)
CHOLEST SERPL-MCNC: 213 MG/DL — HIGH
CO2 SERPL-SCNC: 22 MMOL/L — SIGNIFICANT CHANGE UP (ref 22–31)
CREAT SERPL-MCNC: 0.93 MG/DL — SIGNIFICANT CHANGE UP (ref 0.5–1.3)
EGFR: 59 ML/MIN/1.73M2 — LOW
EGFR: 59 ML/MIN/1.73M2 — LOW
ELLIPTOCYTES BLD QL SMEAR: SLIGHT — SIGNIFICANT CHANGE UP
EOSINOPHIL # BLD AUTO: 0.13 K/UL — SIGNIFICANT CHANGE UP (ref 0–0.5)
EOSINOPHIL NFR BLD AUTO: 2 % — SIGNIFICANT CHANGE UP (ref 0–6)
ESTIMATED AVERAGE GLUCOSE: 111 MG/DL — SIGNIFICANT CHANGE UP (ref 68–114)
GLUCOSE SERPL-MCNC: 172 MG/DL — HIGH (ref 70–99)
HCT VFR BLD CALC: 39.1 % — SIGNIFICANT CHANGE UP (ref 34.5–45)
HDLC SERPL-MCNC: 118 MG/DL — SIGNIFICANT CHANGE UP
HGB BLD-MCNC: 13 G/DL — SIGNIFICANT CHANGE UP (ref 11.5–15.5)
LG PLATELETS BLD QL AUTO: SLIGHT — SIGNIFICANT CHANGE UP
LIPID PNL WITH DIRECT LDL SERPL: 84 MG/DL — SIGNIFICANT CHANGE UP
LYMPHOCYTES # BLD AUTO: 1.47 K/UL — SIGNIFICANT CHANGE UP (ref 1–3.3)
LYMPHOCYTES # BLD AUTO: 23 % — SIGNIFICANT CHANGE UP (ref 13–44)
MANUAL SMEAR VERIFICATION: SIGNIFICANT CHANGE UP
MCHC RBC-ENTMCNC: 30.3 PG — SIGNIFICANT CHANGE UP (ref 27–34)
MCHC RBC-ENTMCNC: 33.2 G/DL — SIGNIFICANT CHANGE UP (ref 32–36)
MCV RBC AUTO: 91.1 FL — SIGNIFICANT CHANGE UP (ref 80–100)
MICROCYTES BLD QL: SLIGHT — SIGNIFICANT CHANGE UP
MONOCYTES # BLD AUTO: 0.19 K/UL — SIGNIFICANT CHANGE UP (ref 0–0.9)
MONOCYTES NFR BLD AUTO: 3 % — SIGNIFICANT CHANGE UP (ref 2–14)
NEUTROPHILS # BLD AUTO: 4.59 K/UL — SIGNIFICANT CHANGE UP (ref 1.8–7.4)
NEUTROPHILS NFR BLD AUTO: 72 % — SIGNIFICANT CHANGE UP (ref 43–77)
NON HDL CHOLESTEROL: 95 MG/DL — SIGNIFICANT CHANGE UP
NRBC # BLD: 0 /100 WBCS — SIGNIFICANT CHANGE UP (ref 0–0)
NRBC BLD-RTO: 0 /100 WBCS — SIGNIFICANT CHANGE UP (ref 0–0)
PLAT MORPH BLD: NORMAL — SIGNIFICANT CHANGE UP
PLATELET # BLD AUTO: 300 K/UL — SIGNIFICANT CHANGE UP (ref 150–400)
POIKILOCYTOSIS BLD QL AUTO: SLIGHT — SIGNIFICANT CHANGE UP
POTASSIUM SERPL-MCNC: 4.5 MMOL/L — SIGNIFICANT CHANGE UP (ref 3.5–5.3)
POTASSIUM SERPL-SCNC: 4.5 MMOL/L — SIGNIFICANT CHANGE UP (ref 3.5–5.3)
PROT SERPL-MCNC: 7.3 G/DL — SIGNIFICANT CHANGE UP (ref 6–8.3)
RBC # BLD: 4.29 M/UL — SIGNIFICANT CHANGE UP (ref 3.8–5.2)
RBC # FLD: 13.1 % — SIGNIFICANT CHANGE UP (ref 10.3–14.5)
RBC BLD AUTO: ABNORMAL
SODIUM SERPL-SCNC: 134 MMOL/L — LOW (ref 135–145)
SPHEROCYTES BLD QL SMEAR: SLIGHT — SIGNIFICANT CHANGE UP
TRIGL SERPL-MCNC: 59 MG/DL — SIGNIFICANT CHANGE UP
TROPONIN I, HIGH SENSITIVITY RESULT: 228.9 NG/L — HIGH
WBC # BLD: 6.38 K/UL — SIGNIFICANT CHANGE UP (ref 3.8–10.5)
WBC # FLD AUTO: 6.38 K/UL — SIGNIFICANT CHANGE UP (ref 3.8–10.5)

## 2025-03-10 PROCEDURE — 93306 TTE W/DOPPLER COMPLETE: CPT | Mod: 26

## 2025-03-10 RX ORDER — TRIAMCINOLONE ACETONIDE 1 MG/G
0 CREAM TOPICAL
Qty: 0 | Refills: 2 | DISCHARGE

## 2025-03-10 RX ORDER — DONEPEZIL HYDROCHLORIDE 5 MG/1
1 TABLET ORAL
Refills: 0 | DISCHARGE

## 2025-03-10 RX ORDER — AMOXICILLIN AND CLAVULANATE POTASSIUM 500; 125 MG/1; MG/1
1 TABLET, FILM COATED ORAL EVERY 12 HOURS
Refills: 0 | Status: DISCONTINUED | OUTPATIENT
Start: 2025-03-10 | End: 2025-03-12

## 2025-03-10 RX ORDER — ATORVASTATIN CALCIUM 80 MG/1
1 TABLET, FILM COATED ORAL
Refills: 2 | DISCHARGE

## 2025-03-10 RX ORDER — DONEPEZIL HYDROCHLORIDE 5 MG/1
10 TABLET ORAL AT BEDTIME
Refills: 0 | Status: DISCONTINUED | OUTPATIENT
Start: 2025-03-10 | End: 2025-03-12

## 2025-03-10 RX ORDER — QUETIAPINE FUMARATE 25 MG/1
50 TABLET ORAL AT BEDTIME
Refills: 0 | Status: DISCONTINUED | OUTPATIENT
Start: 2025-03-10 | End: 2025-03-12

## 2025-03-10 RX ORDER — SODIUM PHOSPHATE,DIBASIC DIHYD
30 POWDER (GRAM) MISCELLANEOUS ONCE
Refills: 0 | Status: COMPLETED | OUTPATIENT
Start: 2025-03-10 | End: 2025-03-10

## 2025-03-10 RX ORDER — MOMETASONE FUROATE 1 MG/G
0 OINTMENT TOPICAL
Qty: 0 | Refills: 0 | DISCHARGE

## 2025-03-10 RX ORDER — CROTAMITON 100 MG/G
0 LOTION TOPICAL
Qty: 0 | Refills: 0 | DISCHARGE

## 2025-03-10 RX ORDER — TRIAMCINOLONE ACETONIDE 1 MG/G
1 CREAM TOPICAL
Refills: 0 | Status: DISCONTINUED | OUTPATIENT
Start: 2025-03-10 | End: 2025-03-12

## 2025-03-10 RX ORDER — LOSARTAN POTASSIUM 100 MG/1
100 TABLET, FILM COATED ORAL DAILY
Refills: 0 | Status: DISCONTINUED | OUTPATIENT
Start: 2025-03-10 | End: 2025-03-12

## 2025-03-10 RX ORDER — CLINDAMYCIN 1 G/10ML
0 GEL TOPICAL
Qty: 0 | Refills: 0 | DISCHARGE

## 2025-03-10 RX ORDER — METOPROLOL SUCCINATE 50 MG/1
12.5 TABLET, EXTENDED RELEASE ORAL
Refills: 0 | Status: DISCONTINUED | OUTPATIENT
Start: 2025-03-10 | End: 2025-03-11

## 2025-03-10 RX ORDER — FLUTICASONE PROPIONATE 50 UG/1
1 SPRAY, METERED NASAL
Refills: 0 | Status: DISCONTINUED | OUTPATIENT
Start: 2025-03-10 | End: 2025-03-12

## 2025-03-10 RX ORDER — KETOCONAZOLE 20 MG/G
0 CREAM TOPICAL
Qty: 0 | Refills: 0 | DISCHARGE

## 2025-03-10 RX ORDER — LATANOPROST PF 0.05 MG/ML
1 SOLUTION/ DROPS OPHTHALMIC
Refills: 3 | DISCHARGE

## 2025-03-10 RX ORDER — CICLOPIROX 10 MG/.96ML
0 SHAMPOO TOPICAL
Qty: 0 | Refills: 0 | DISCHARGE

## 2025-03-10 RX ORDER — LATANOPROST PF 0.05 MG/ML
1 SOLUTION/ DROPS OPHTHALMIC AT BEDTIME
Refills: 0 | Status: DISCONTINUED | OUTPATIENT
Start: 2025-03-10 | End: 2025-03-12

## 2025-03-10 RX ORDER — ATORVASTATIN CALCIUM 80 MG/1
20 TABLET, FILM COATED ORAL AT BEDTIME
Refills: 0 | Status: DISCONTINUED | OUTPATIENT
Start: 2025-03-10 | End: 2025-03-12

## 2025-03-10 RX ORDER — OXICONAZOLE NITRATE 10 MG/G
0 CREAM TOPICAL
Qty: 0 | Refills: 0 | DISCHARGE

## 2025-03-10 RX ORDER — ALENDRONATE SODIUM 70 MG/1
1 TABLET ORAL
Refills: 2 | DISCHARGE

## 2025-03-10 RX ORDER — AMMONIUM LACTATE 12 %
0 LOTION (ML) TOPICAL
Qty: 0 | Refills: 1 | DISCHARGE

## 2025-03-10 RX ORDER — HYDROCORTISONE 10 MG/G
1 CREAM TOPICAL DAILY
Refills: 0 | Status: DISCONTINUED | OUTPATIENT
Start: 2025-03-10 | End: 2025-03-10

## 2025-03-10 RX ORDER — QUETIAPINE FUMARATE 25 MG/1
2 TABLET ORAL
Refills: 0 | DISCHARGE

## 2025-03-10 RX ORDER — AMLODIPINE BESYLATE 10 MG/1
1 TABLET ORAL
Refills: 1 | DISCHARGE

## 2025-03-10 RX ADMIN — FLUTICASONE PROPIONATE 1 SPRAY(S): 50 SPRAY, METERED NASAL at 18:46

## 2025-03-10 RX ADMIN — QUETIAPINE FUMARATE 50 MILLIGRAM(S): 25 TABLET ORAL at 21:59

## 2025-03-10 RX ADMIN — LOSARTAN POTASSIUM 100 MILLIGRAM(S): 100 TABLET, FILM COATED ORAL at 04:58

## 2025-03-10 RX ADMIN — AMOXICILLIN AND CLAVULANATE POTASSIUM 1 TABLET(S): 500; 125 TABLET, FILM COATED ORAL at 18:45

## 2025-03-10 RX ADMIN — ATORVASTATIN CALCIUM 20 MILLIGRAM(S): 80 TABLET, FILM COATED ORAL at 21:59

## 2025-03-10 RX ADMIN — Medication 650 MILLIGRAM(S): at 08:38

## 2025-03-10 RX ADMIN — Medication 85 MILLIMOLE(S): at 03:28

## 2025-03-10 RX ADMIN — Medication 1 LOZENGE: at 02:59

## 2025-03-10 RX ADMIN — Medication 650 MILLIGRAM(S): at 09:08

## 2025-03-10 RX ADMIN — DONEPEZIL HYDROCHLORIDE 10 MILLIGRAM(S): 5 TABLET ORAL at 21:59

## 2025-03-10 RX ADMIN — TRIAMCINOLONE ACETONIDE 1 APPLICATION(S): 1 CREAM TOPICAL at 18:46

## 2025-03-10 RX ADMIN — Medication 40 MILLIGRAM(S): at 18:45

## 2025-03-10 RX ADMIN — METOPROLOL SUCCINATE 12.5 MILLIGRAM(S): 50 TABLET, EXTENDED RELEASE ORAL at 18:45

## 2025-03-10 RX ADMIN — ENOXAPARIN SODIUM 40 MILLIGRAM(S): 100 INJECTION SUBCUTANEOUS at 18:52

## 2025-03-10 RX ADMIN — LATANOPROST PF 1 DROP(S): 0.05 SOLUTION/ DROPS OPHTHALMIC at 22:28

## 2025-03-10 NOTE — PROGRESS NOTE ADULT - PROBLEM SELECTOR PLAN 7
hx of glaucoma  -c/w home latanprost opthalmic gtt qhs h/o HLD on atorvastatin  -lipid profile controlled   -c/w home med atorvastatin 20 mg qhs

## 2025-03-10 NOTE — PROGRESS NOTE ADULT - PROBLEM SELECTOR PLAN 3
p/w serum potassium 2.7   -likely 2/2 poor PO intake and HCTZ  -s/p KCl 40meq PO x1 and 10meq IV x3   -resolved complaining of several days of sore throat, dry cough, nasal congestion, and left ear pain  -Flu/COVID/RSV PCR negative  -CXR no PNA   -suspect viral URI  -c/w augmentin 875 mg bid   -supportive care  -start flonase BID  -benzocaine lozenge PRN  -PPI for possible component of GERD

## 2025-03-10 NOTE — PROGRESS NOTE ADULT - PROBLEM SELECTOR PLAN 5
long history of dermatitis mainly on scalp following with dermatology s/p multiple trials of various topical steroids, antifungal shampoos, and topical antibiotics  -c/w topical triamcinolone cream to scalp BID hx of GERD not on PPI or H2 blocker  -reports very sensitive stomach in response to certain foods currently complaining of epigastric pain, frequent burping, and sore throat with dry cough  -suspect some URI symptoms may be confounded by untreated GERD  -start protonix 40mg PO BID

## 2025-03-10 NOTE — PROGRESS NOTE ADULT - PROBLEM SELECTOR PLAN 6
h/o HLD on atorvastatin  -lipid profile controlled   -c/w home med atorvastatin 20 mg qhs long history of dermatitis mainly on scalp following with dermatology s/p multiple trials of various topical steroids, antifungal shampoos, and topical antibiotics  -c/w topical triamcinolone cream to scalp BID

## 2025-03-10 NOTE — PROGRESS NOTE ADULT - PROBLEM SELECTOR PLAN 4
hx of GERD not on PPI or H2 blocker  -reports very sensitive stomach in response to certain foods currently complaining of epigastric pain, frequent burping, and sore throat with dry cough  -suspect some URI symptoms may be confounded by untreated GERD  -start protonix 40mg PO BID p/w serum potassium 2.7   -likely 2/2 poor PO intake and HCTZ  -s/p KCl 40meq PO x1 and 10meq IV x3   -resolved

## 2025-03-10 NOTE — PATIENT PROFILE ADULT - FUNCTIONAL ASSESSMENT - DAILY ACTIVITY 1.
Quality 110: Preventive Care And Screening: Influenza Immunization: Influenza Immunization previously received during influenza season Detail Level: Detailed 4 = No assist / stand by assistance

## 2025-03-10 NOTE — PATIENT PROFILE ADULT - STATED REASON FOR ADMISSION
I use to have a cold but I was given medication.  I have really high blood pressure and they are monitoring it.

## 2025-03-10 NOTE — PROGRESS NOTE ADULT - SUBJECTIVE AND OBJECTIVE BOX
Patient is a 89y old  Female who presents with a chief complaint of HTN urgency, Hypokalemia, URI, Type 2 MI (09 Mar 2025 22:47)    pt seen in icu [  ], reg med floor [   ], bed [  ], chair at bedside [   ], a+o x3 [  ], lethargic [  ],  nad [  ]    robison [  ], ngt [  ], peg [  ], et tube [  ], cent line [  ], picc line [  ]        Allergies    No Known Allergies        Vitals    T(F): 97.7 (03-10-25 @ 05:26), Max: 97.9 (03-09-25 @ 17:47)  HR: 80 (03-10-25 @ 05:26) (66 - 80)  BP: 167/64 (03-10-25 @ 05:26) (160/72 - 215/85)  RR: 18 (03-10-25 @ 05:26) (18 - 24)  SpO2: 97% (03-10-25 @ 05:26) (97% - 100%)  Wt(kg): --  CAPILLARY BLOOD GLUCOSE          Labs                          13.0   x     )-----------( 300      ( 10 Mar 2025 05:00 )             39.1       03-10    134[L]  |  106  |  9   ----------------------------<  172[H]  4.5   |  22  |  0.93    Ca    9.2      10 Mar 2025 05:00  Phos  1.2     03-09  Mg     1.7     03-09    TPro  7.3  /  Alb  3.5  /  TBili  0.3  /  DBili  x   /  AST  13  /  ALT  18  /  AlkPhos  80  03-10          Troponin I, High Sensitivity Result: 228.9 ng/L (03-10-25 @ 05:00)  Troponin I, High Sensitivity Result: 242.9 ng/L (03-09-25 @ 22:43)  Troponin I, High Sensitivity Result: 228.3 ng/L (03-09-25 @ 18:31)        Radiology Results      Meds    MEDICATIONS  (STANDING):  amLODIPine   Tablet 5 milliGRAM(s) Oral daily  atorvastatin 20 milliGRAM(s) Oral at bedtime  donepezil 10 milliGRAM(s) Oral at bedtime  enoxaparin Injectable 40 milliGRAM(s) SubCutaneous every 24 hours  hydrochlorothiazide 25 milliGRAM(s) Oral daily  latanoprost 0.005% Ophthalmic Solution 1 Drop(s) Both EYES at bedtime  losartan 100 milliGRAM(s) Oral daily  pantoprazole    Tablet 40 milliGRAM(s) Oral two times a day  QUEtiapine 50 milliGRAM(s) Oral at bedtime  triamcinolone 0.1% Cream 1 Application(s) Topical two times a day      MEDICATIONS  (PRN):  acetaminophen     Tablet .. 650 milliGRAM(s) Oral every 6 hours PRN Temp greater or equal to 38C (100.4F), Mild Pain (1 - 3)  benzocaine/menthol Lozenge 1 Lozenge Oral every 3 hours PRN Sore Throat  ondansetron Injectable 4 milliGRAM(s) IV Push every 8 hours PRN Nausea and/or Vomiting      Physical Exam    Neuro :  no focal deficits  Respiratory: CTA B/L  CV: RRR, S1S2, no murmurs,   Abdominal: Soft, NT, ND +BS,  Extremities: No edema, + peripheral pulses    ASSESSMENT    Other specified abnormal findings of blood chemistry    HTN (hypertension)    HLD (hyperlipidemia)    Glaucoma    HTN (hypertension)    Calculus of bile duct    H/O cerebellar ataxia    Vertigo    Migraine    Renal cyst    Fatty liver    GERD (gastroesophageal reflux disease)    S/P cholecystectomy    History of cholecystectomy    S/P knee replacement    H/O eye surgery        PLAN     Patient is a 89y old  Female who presents with a chief complaint of HTN urgency, Hypokalemia, URI, Type 2 MI (09 Mar 2025 22:47)    pt seen in ed tele [ x ], reg med floor [   ], bed [x  ], chair at bedside [   ], a+o x3 [ x ], lethargic [  ],  nad [ x ]        Allergies    No Known Allergies        Vitals    T(F): 97.7 (03-10-25 @ 05:26), Max: 97.9 (03-09-25 @ 17:47)  HR: 80 (03-10-25 @ 05:26) (66 - 80)  BP: 167/64 (03-10-25 @ 05:26) (160/72 - 215/85)  RR: 18 (03-10-25 @ 05:26) (18 - 24)  SpO2: 97% (03-10-25 @ 05:26) (97% - 100%)  Wt(kg): --  CAPILLARY BLOOD GLUCOSE          Labs                          13.0   x     )-----------( 300      ( 10 Mar 2025 05:00 )             39.1       03-10    134[L]  |  106  |  9   ----------------------------<  172[H]  4.5   |  22  |  0.93    Ca    9.2      10 Mar 2025 05:00  Phos  1.2     03-09  Mg     1.7     03-09    TPro  7.3  /  Alb  3.5  /  TBili  0.3  /  DBili  x   /  AST  13  /  ALT  18  /  AlkPhos  80  03-10          Troponin I, High Sensitivity Result: 228.9 ng/L (03-10-25 @ 05:00)  Troponin I, High Sensitivity Result: 242.9 ng/L (03-09-25 @ 22:43)  Troponin I, High Sensitivity Result: 228.3 ng/L (03-09-25 @ 18:31)        Radiology Results      Meds    MEDICATIONS  (STANDING):  amLODIPine   Tablet 5 milliGRAM(s) Oral daily  atorvastatin 20 milliGRAM(s) Oral at bedtime  donepezil 10 milliGRAM(s) Oral at bedtime  enoxaparin Injectable 40 milliGRAM(s) SubCutaneous every 24 hours  hydrochlorothiazide 25 milliGRAM(s) Oral daily  latanoprost 0.005% Ophthalmic Solution 1 Drop(s) Both EYES at bedtime  losartan 100 milliGRAM(s) Oral daily  pantoprazole    Tablet 40 milliGRAM(s) Oral two times a day  QUEtiapine 50 milliGRAM(s) Oral at bedtime  triamcinolone 0.1% Cream 1 Application(s) Topical two times a day      MEDICATIONS  (PRN):  acetaminophen     Tablet .. 650 milliGRAM(s) Oral every 6 hours PRN Temp greater or equal to 38C (100.4F), Mild Pain (1 - 3)  benzocaine/menthol Lozenge 1 Lozenge Oral every 3 hours PRN Sore Throat  ondansetron Injectable 4 milliGRAM(s) IV Push every 8 hours PRN Nausea and/or Vomiting      Physical Exam    Neuro :  no focal deficits  Respiratory: CTA B/L  CV: RRR, S1S2, no murmurs,   Abdominal: Soft, NT, ND +BS,  Extremities: No edema, + peripheral pulses      ASSESSMENT    uncontrolled htn with urgency,   troponinemia.    r/o acs,   hypokalemia,   uri,   sore throat,   h/o HTN,   HLD,   Breast CA s/p R mastectomy,   Glaucoma,   Vertigo,   Migraine,   Renal cyst,   Fatty liver,   GERD   Calculus of bile duct s/p ERCP,  S/P cholecystectomy  S/P knee replacement  H/O eye surgery        PLAN     cont tele,   acs protocol,   aspirin, statin,   resume antihypertensives   trop x3 elevated noted above   cardio cons  f/u echo   supplemented potassium   potassium wnl  start augmentin 875 bid x 7 days,   start cepacol yefri,   cont current meds

## 2025-03-10 NOTE — CONSULT NOTE ADULT - NEGATIVE OPHTHALMOLOGIC SYMPTOMS
no lacrimation L/no lacrimation R/no blurred vision L/no blurred vision R/no discharge L/no discharge R/no scleral injection L/no scleral injection R

## 2025-03-10 NOTE — PATIENT PROFILE ADULT - FALL HARM RISK - RISK INTERVENTIONS

## 2025-03-10 NOTE — CONSULT NOTE ADULT - ASSESSMENT
89y F, from home, ambulates with cane/walker, with PMH of HTN, HLD, Breast CA s/p R mastectomy, Glaucoma, Calculus of bile duct s/p ERCP, Vertigo, Migraine, Renal cyst, Fatty liver, and GERD p/w several days of sore throat, nasal congestion, R ear pain, dry cough, and epigastric pain.  1.Mod as last year-Echocardiogram.  2.HTN-cont ace,norvasc,d/c hctz.Add low dose b blocker.  3.Abdominal pain GI eval.  4.Lipid d/o-statin.  5.GI and DVT prophylaxis.

## 2025-03-10 NOTE — CONSULT NOTE ADULT - ASSESSMENT
1. Abdominal pain in this patient can be due to:    - Peptic ulcer disease    - Esophagitis    - Gastroparesis  2. GERD  3. Diverticulosis without diverticulitis  4. Sigmoid wall thickening seen on CT-Scan dated 06/2024  5. R/o colonic neoplasm  6. Hepatic steatosis    Suggestions:    1. Protonix 40mg BID  2. Anti reflux measure  3. Better control of DM  4. Avoid NSAID  5. Diet as tolerated  6. Colonoscopy out patient  7. DVT prophylaxis 1. Abdominal pain in this patient can be due to:    - Peptic ulcer disease    - Esophagitis    - Viral gastritis    - Gastroparesis  2. GERD  3. Diverticulosis without diverticulitis  4. Sigmoid wall thickening seen on CT-Scan dated 06/2024  5. R/o colonic neoplasm  6. Hepatic steatosis    Suggestions:    1. Protonix 40mg BID  2. Anti reflux measure  3. Better control of DM  4. Avoid NSAID  5. Diet as tolerated  6. Colonoscopy out patient  7. DVT prophylaxis

## 2025-03-10 NOTE — CONSULT NOTE ADULT - NEGATIVE ENMT SYMPTOMS
no hearing difficulty/no ear pain/no tinnitus/no sinus symptoms/no nose bleeds/no gum bleeding/no dry mouth/no dysphagia

## 2025-03-10 NOTE — PROGRESS NOTE ADULT - SUBJECTIVE AND OBJECTIVE BOX
Patient is a 89y old  Female who presents with a chief complaint of HTN urgency, Hypokalemia, URI, Type 2 MI (10 Mar 2025 12:15)    OVERNIGHT EVENTS: no acute changes.     Pt is aox3, comfortable appearing, tolerating PO, requires assistance out of bed.   On telemetry - sinus rhythm 60s bpm with PVC.     REVIEW OF SYSTEMS:  #[id number]  CONSTITUTIONAL: No fever, chills  ENMT:  +sore throat. No difficulty hearing, no change in vision  NECK: No pain or stiffness  RESPIRATORY: No cough, SOB  CARDIOVASCULAR: No chest pain, palpitations  GASTROINTESTINAL: No abdominal pain. No nausea, vomiting, or diarrhea  GENITOURINARY: No dysuria  NEUROLOGICAL: +HA  SKIN: +itchy scalp.   LYMPH NODES: No enlarged glands  ENDOCRINE: No heat or cold intolerance; No hair loss  MUSCULOSKELETAL: No joint pain or swelling; No muscle, back, or extremity pain  PSYCHIATRIC: No depression, anxiety  HEME/LYMPH: No easy bruising, or bleeding gums    T(C): 36.5 (03-10-25 @ 05:26), Max: 36.6 (03-09-25 @ 17:47)  HR: 77 (03-10-25 @ 07:35) (66 - 80)  BP: 148/68 (03-10-25 @ 07:35) (148/68 - 215/85)  RR: 16 (03-10-25 @ 07:35) (16 - 24)  SpO2: 98% (03-10-25 @ 07:35) (97% - 100%)  Wt(kg): --Vital Signs Last 24 Hrs  T(C): 36.5 (10 Mar 2025 05:26), Max: 36.6 (09 Mar 2025 17:47)  T(F): 97.7 (10 Mar 2025 05:26), Max: 97.9 (09 Mar 2025 17:47)  HR: 77 (10 Mar 2025 07:35) (66 - 80)  BP: 148/68 (10 Mar 2025 07:35) (148/68 - 215/85)  BP(mean): 95 (10 Mar 2025 05:26) (91 - 95)  RR: 16 (10 Mar 2025 07:35) (16 - 24)  SpO2: 98% (10 Mar 2025 07:35) (97% - 100%)    Parameters below as of 10 Mar 2025 07:35  Patient On (Oxygen Delivery Method): room air        MEDICATIONS  (STANDING):  amLODIPine   Tablet 5 milliGRAM(s) Oral daily  amoxicillin  875 milliGRAM(s)/clavulanate 1 Tablet(s) Oral every 12 hours  atorvastatin 20 milliGRAM(s) Oral at bedtime  donepezil 10 milliGRAM(s) Oral at bedtime  enoxaparin Injectable 40 milliGRAM(s) SubCutaneous every 24 hours  fluticasone propionate 50 MICROgram(s)/spray Nasal Spray 1 Spray(s) Both Nostrils two times a day  hydrocortisone 1% Cream 1 Application(s) Topical daily  latanoprost 0.005% Ophthalmic Solution 1 Drop(s) Both EYES at bedtime  losartan 100 milliGRAM(s) Oral daily  metoprolol tartrate 12.5 milliGRAM(s) Oral two times a day  pantoprazole    Tablet 40 milliGRAM(s) Oral two times a day  QUEtiapine 50 milliGRAM(s) Oral at bedtime  triamcinolone 0.1% Cream 1 Application(s) Topical two times a day    MEDICATIONS  (PRN):  acetaminophen     Tablet .. 650 milliGRAM(s) Oral every 6 hours PRN Temp greater or equal to 38C (100.4F), Mild Pain (1 - 3)  benzocaine/menthol Lozenge 1 Lozenge Oral every 3 hours PRN Sore Throat  ondansetron Injectable 4 milliGRAM(s) IV Push every 8 hours PRN Nausea and/or Vomiting      PHYSICAL EXAM:  GENERAL: NAD  EYES: clear conjunctiva  ENMT: Moist mucous membranes  NECK: Supple, No JVD, Normal thyroid  CHEST/LUNG: Clear to auscultation bilaterally; No rales, rhonchi, wheezing, or rubs  HEART: S1, S2, Regular rate and rhythm  ABDOMEN: Soft, Nontender, Nondistended; Bowel sounds present  NEURO: Alert & Oriented X3  EXTREMITIES: No LE edema, no calf tenderness  LYMPH: No lymphadenopathy noted  SKIN: +forehead erythema. No rashes or lesions    Consultant(s) Notes Reviewed:  [x ] YES  [ ] NO  Care Discussed with Consultants/Other Providers [ x] YES  [ ] NO    LABS:                        13.0   6.38  )-----------( 300      ( 10 Mar 2025 05:00 )             39.1     03-10    134[L]  |  106  |  9   ----------------------------<  172[H]  4.5   |  22  |  0.93    Ca    9.2      10 Mar 2025 05:00  Phos  1.2     03-09  Mg     1.7     03-09    TPro  7.3  /  Alb  3.5  /  TBili  0.3  /  DBili  x   /  AST  13  /  ALT  18  /  AlkPhos  80  03-10      CAPILLARY BLOOD GLUCOSE            Urinalysis Basic - ( 10 Mar 2025 05:00 )    Color: x / Appearance: x / SG: x / pH: x  Gluc: 172 mg/dL / Ketone: x  / Bili: x / Urobili: x   Blood: x / Protein: x / Nitrite: x   Leuk Esterase: x / RBC: x / WBC x   Sq Epi: x / Non Sq Epi: x / Bacteria: x        RADIOLOGY & ADDITIONAL TESTS:  < from: Xray Chest 2 Views PA/Lat (03.09.25 @ 19:07) >    ACC: [id number] EXAM:  XR CHEST PA LAT 2V   ORDERED BY: JOSE A MARTINEZ     PROCEDURE DATE:  03/09/2025          INTERPRETATION:  TECHNIQUE: 2 views of the chest.    COMPARISON: None    CLINICAL HISTORY: uri    FINDINGS:    Frontal and lateral views of the chest demonstrates the lungs to be   clear. The cardiomediastinal silhouette is unremarkable. No acute osseous   abnormalities.    IMPRESSION:    No acute cardiopulmonary disease process.    --- End of Report ---            ANAYELI COATES MD; Attending Radiologist  This document has been electronically signed. Mar  9 2025 11:32PM    < end of copied text >      Imaging Personally Reviewed:  [ ] YES  [ ] NO

## 2025-03-10 NOTE — CONSULT NOTE ADULT - REASON FOR ADMISSION
HTN urgency, Hypokalemia, URI, Type 2 MI

## 2025-03-11 LAB
ANION GAP SERPL CALC-SCNC: 11 MMOL/L — SIGNIFICANT CHANGE UP (ref 5–17)
APPEARANCE UR: CLEAR — SIGNIFICANT CHANGE UP
BACTERIA # UR AUTO: ABNORMAL /HPF
BILIRUB UR-MCNC: NEGATIVE — SIGNIFICANT CHANGE UP
BUN SERPL-MCNC: 14 MG/DL — SIGNIFICANT CHANGE UP (ref 7–18)
CALCIUM SERPL-MCNC: 9.3 MG/DL — SIGNIFICANT CHANGE UP (ref 8.4–10.5)
CHLORIDE SERPL-SCNC: 106 MMOL/L — SIGNIFICANT CHANGE UP (ref 96–108)
CO2 SERPL-SCNC: 22 MMOL/L — SIGNIFICANT CHANGE UP (ref 22–31)
COLOR SPEC: YELLOW — SIGNIFICANT CHANGE UP
CREAT SERPL-MCNC: 0.83 MG/DL — SIGNIFICANT CHANGE UP (ref 0.5–1.3)
DIFF PNL FLD: NEGATIVE — SIGNIFICANT CHANGE UP
EGFR: 67 ML/MIN/1.73M2 — SIGNIFICANT CHANGE UP
EGFR: 67 ML/MIN/1.73M2 — SIGNIFICANT CHANGE UP
EPI CELLS # UR: SIGNIFICANT CHANGE UP
GLUCOSE SERPL-MCNC: 108 MG/DL — HIGH (ref 70–99)
GLUCOSE UR QL: NEGATIVE MG/DL — SIGNIFICANT CHANGE UP
KETONES UR-MCNC: NEGATIVE MG/DL — SIGNIFICANT CHANGE UP
LEUKOCYTE ESTERASE UR-ACNC: ABNORMAL
NITRITE UR-MCNC: NEGATIVE — SIGNIFICANT CHANGE UP
PH UR: 7 — SIGNIFICANT CHANGE UP (ref 5–8)
PHOSPHATE SERPL-MCNC: 3.2 MG/DL — SIGNIFICANT CHANGE UP (ref 2.5–4.5)
POTASSIUM SERPL-MCNC: 3.6 MMOL/L — SIGNIFICANT CHANGE UP (ref 3.5–5.3)
POTASSIUM SERPL-SCNC: 3.6 MMOL/L — SIGNIFICANT CHANGE UP (ref 3.5–5.3)
PROT UR-MCNC: NEGATIVE MG/DL — SIGNIFICANT CHANGE UP
RBC CASTS # UR COMP ASSIST: 0 /HPF — SIGNIFICANT CHANGE UP (ref 0–4)
SODIUM SERPL-SCNC: 139 MMOL/L — SIGNIFICANT CHANGE UP (ref 135–145)
SP GR SPEC: 1.01 — SIGNIFICANT CHANGE UP (ref 1–1.03)
UROBILINOGEN FLD QL: 0.2 MG/DL — SIGNIFICANT CHANGE UP (ref 0.2–1)
WBC UR QL: 1 /HPF — SIGNIFICANT CHANGE UP (ref 0–5)

## 2025-03-11 RX ADMIN — Medication 40 MILLIGRAM(S): at 05:50

## 2025-03-11 RX ADMIN — AMLODIPINE BESYLATE 5 MILLIGRAM(S): 10 TABLET ORAL at 05:51

## 2025-03-11 RX ADMIN — METOPROLOL SUCCINATE 12.5 MILLIGRAM(S): 50 TABLET, EXTENDED RELEASE ORAL at 05:50

## 2025-03-11 RX ADMIN — TRIAMCINOLONE ACETONIDE 1 APPLICATION(S): 1 CREAM TOPICAL at 05:52

## 2025-03-11 RX ADMIN — LOSARTAN POTASSIUM 100 MILLIGRAM(S): 100 TABLET, FILM COATED ORAL at 05:51

## 2025-03-11 RX ADMIN — DONEPEZIL HYDROCHLORIDE 10 MILLIGRAM(S): 5 TABLET ORAL at 21:54

## 2025-03-11 RX ADMIN — QUETIAPINE FUMARATE 50 MILLIGRAM(S): 25 TABLET ORAL at 21:54

## 2025-03-11 RX ADMIN — FLUTICASONE PROPIONATE 1 SPRAY(S): 50 SPRAY, METERED NASAL at 18:31

## 2025-03-11 RX ADMIN — ENOXAPARIN SODIUM 40 MILLIGRAM(S): 100 INJECTION SUBCUTANEOUS at 18:32

## 2025-03-11 RX ADMIN — Medication 40 MILLIGRAM(S): at 18:32

## 2025-03-11 RX ADMIN — LATANOPROST PF 1 DROP(S): 0.05 SOLUTION/ DROPS OPHTHALMIC at 21:54

## 2025-03-11 RX ADMIN — ATORVASTATIN CALCIUM 20 MILLIGRAM(S): 80 TABLET, FILM COATED ORAL at 21:54

## 2025-03-11 RX ADMIN — TRIAMCINOLONE ACETONIDE 1 APPLICATION(S): 1 CREAM TOPICAL at 18:32

## 2025-03-11 RX ADMIN — AMOXICILLIN AND CLAVULANATE POTASSIUM 1 TABLET(S): 500; 125 TABLET, FILM COATED ORAL at 18:31

## 2025-03-11 RX ADMIN — AMOXICILLIN AND CLAVULANATE POTASSIUM 1 TABLET(S): 500; 125 TABLET, FILM COATED ORAL at 05:51

## 2025-03-11 RX ADMIN — FLUTICASONE PROPIONATE 1 SPRAY(S): 50 SPRAY, METERED NASAL at 05:52

## 2025-03-11 NOTE — PROGRESS NOTE ADULT - SUBJECTIVE AND OBJECTIVE BOX
Date of Service 03-11-25 @ 09:53    CHIEF COMPLAINT:Patient is a 89y old  Female who presents with a chief complaint of HTN urgency, Hypokalemia, URI, Type 2 MI .Pt appears comfortable.    	  REVIEW OF SYSTEMS:  CONSTITUTIONAL: No fever, weight loss, or fatigue  EYES: No eye pain, visual disturbances, or discharge  ENT:  No difficulty hearing, tinnitus, vertigo; No sinus or throat pain  NECK: No pain or stiffness  RESPIRATORY: No cough, wheezing, chills or hemoptysis; No Shortness of Breath  CARDIOVASCULAR: No chest pain, palpitations, passing out, dizziness, or leg swelling  GASTROINTESTINAL: No abdominal or epigastric pain. No nausea, vomiting, or hematemesis; No diarrhea or constipation. No melena or hematochezia.  GENITOURINARY: No dysuria, frequency, hematuria, or incontinence  NEUROLOGICAL: No headaches, memory loss, loss of strength, numbness, or tremors  SKIN: No itching, burning, rashes, or lesions   LYMPH Nodes: No enlarged glands  ENDOCRINE: No heat or cold intolerance; No hair loss  MUSCULOSKELETAL: No joint pain or swelling; No muscle, back, or extremity pain  PSYCHIATRIC: No depression, anxiety, mood swings, or difficulty sleeping  HEME/LYMPH: No easy bruising, or bleeding gums  ALLERGY AND IMMUNOLOGIC: No hives or eczema	        PHYSICAL EXAM:  T(C): 36.5 (03-11-25 @ 07:13), Max: 37 (03-10-25 @ 17:21)  HR: 57 (03-11-25 @ 07:13) (57 - 72)  BP: 128/56 (03-11-25 @ 07:13) (109/52 - 145/64)  RR: 18 (03-11-25 @ 07:13) (18 - 18)  SpO2: 98% (03-11-25 @ 07:13) (94% - 100%)  Wt(kg): --  I&O's Summary    10 Mar 2025 07:01  -  11 Mar 2025 07:00  --------------------------------------------------------  IN: 0 mL / OUT: 400 mL / NET: -400 mL        Appearance: Normal	  HEENT:   Normal oral mucosa, PERRL, EOMI	  Lymphatic: No lymphadenopathy  Cardiovascular: Normal S1 S2, No JVD, No murmurs, No edema  Respiratory: Lungs clear to auscultation	  Psychiatry: A & O x 3, Mood & affect appropriate  Gastrointestinal:  Soft, Non-tender, + BS	  Skin: No rashes, No ecchymoses, No cyanosis	  Neurologic: Non-focal  Extremities: Normal range of motion, No clubbing, cyanosis or edema  Vascular: Peripheral pulses palpable 2+ bilaterally    MEDICATIONS  (STANDING):  amLODIPine   Tablet 5 milliGRAM(s) Oral daily  amoxicillin  875 milliGRAM(s)/clavulanate 1 Tablet(s) Oral every 12 hours  atorvastatin 20 milliGRAM(s) Oral at bedtime  donepezil 10 milliGRAM(s) Oral at bedtime  enoxaparin Injectable 40 milliGRAM(s) SubCutaneous every 24 hours  fluticasone propionate 50 MICROgram(s)/spray Nasal Spray 1 Spray(s) Both Nostrils two times a day  latanoprost 0.005% Ophthalmic Solution 1 Drop(s) Both EYES at bedtime  losartan 100 milliGRAM(s) Oral daily  metoprolol tartrate 12.5 milliGRAM(s) Oral two times a day  pantoprazole    Tablet 40 milliGRAM(s) Oral two times a day  QUEtiapine 50 milliGRAM(s) Oral at bedtime  triamcinolone 0.1% Cream 1 Application(s) Topical two times a day      TELEMETRY: nsr down to 40's	      	  LABS:	 	    Troponin I, High Sensitivity Result: 228.9 ng/L (03-10 @ 05:00)  Troponin I, High Sensitivity Result: 242.9 ng/L (03-09 @ 22:43)  Troponin I, High Sensitivity Result: 228.3 ng/L (03-09 @ 18:31)                            13.0   6.38  )-----------( 300      ( 10 Mar 2025 05:00 )             39.1     03-11    139  |  106  |  14  ----------------------------<  108[H]  3.6   |  22  |  0.83    Ca    9.3      11 Mar 2025 06:14  Phos  3.2     03-11  Mg     1.7     03-09    TPro  7.3  /  Alb  3.5  /  TBili  0.3  /  DBili  x   /  AST  13  /  ALT  18  /  AlkPhos  80  03-10    proBNP:   Lipid Profile: Cholesterol 213  LDL --    TG 59  Ldl calc 84

## 2025-03-11 NOTE — PROGRESS NOTE ADULT - PROBLEM SELECTOR PLAN 4
P/w serum potassium 2.7   -likely 2/2 poor PO intake and HCTZ  -HCTZ discontinued   -S/p KCl 40meq PO x1 and 10meq IV x3   -resolved

## 2025-03-11 NOTE — PROGRESS NOTE ADULT - PROBLEM SELECTOR PLAN 6
H/o dermatitis mainly on scalp, follows with derm outpt s/p multiple trials of topical steroids, antifungal shampoos, and topical antibiotics  -C/w topical triamcinolone cream to scalp BID

## 2025-03-11 NOTE — PROGRESS NOTE ADULT - SUBJECTIVE AND OBJECTIVE BOX
Patient is a 89y old  Female who presents with a chief complaint of HTN urgency, Hypokalemia, URI, Type 2 MI (11 Mar 2025 10:47)  Awake, alert, comfortable in bed in NAD. Doing well on RA. Still with occasional cough    INTERVAL HPI/OVERNIGHT EVENTS:      VITAL SIGNS:  T(F): 97.9 (25 @ 11:06)  HR: 58 (25 @ 11:06)  BP: 103/42 (25 @ 11:06)  RR: 18 (25 @ 11:06)  SpO2: 95% (25 @ 11:06)  Wt(kg): --  I&O's Detail    10 Mar 2025 07:01  -  11 Mar 2025 07:00  --------------------------------------------------------  IN:  Total IN: 0 mL    OUT:    Voided (mL): 400 mL  Total OUT: 400 mL    Total NET: -400 mL              REVIEW OF SYSTEMS:    CONSTITUTIONAL:  No fevers, chills, sweats    HEENT:  Eyes:  No diplopia or blurred vision. ENT:  No earache, sore throat or runny nose.    CARDIOVASCULAR:  No pressure, squeezing, tightness, or heaviness about the chest; no palpitations.    RESPIRATORY:  Per HPI    GASTROINTESTINAL:  No abdominal pain, nausea, vomiting or diarrhea.    GENITOURINARY:  No dysuria, frequency or urgency.    NEUROLOGIC:  No paresthesias, fasciculations, seizures or weakness.    PSYCHIATRIC:  No disorder of thought or mood.      PHYSICAL EXAM:    Constitutional: Well developed and nourished  Eyes:Perrla  ENMT: normal  Neck:supple  Respiratory: good air entry  Cardiovascular: S1 S2 regular  Gastrointestinal: Soft, Non tender  Extremities: No edema  Vascular:normal  Neurological:Awake, alert,Ox3  Musculoskeletal:Normal      MEDICATIONS  (STANDING):  amLODIPine   Tablet 5 milliGRAM(s) Oral daily  amoxicillin  875 milliGRAM(s)/clavulanate 1 Tablet(s) Oral every 12 hours  atorvastatin 20 milliGRAM(s) Oral at bedtime  donepezil 10 milliGRAM(s) Oral at bedtime  enoxaparin Injectable 40 milliGRAM(s) SubCutaneous every 24 hours  fluticasone propionate 50 MICROgram(s)/spray Nasal Spray 1 Spray(s) Both Nostrils two times a day  latanoprost 0.005% Ophthalmic Solution 1 Drop(s) Both EYES at bedtime  losartan 100 milliGRAM(s) Oral daily  pantoprazole    Tablet 40 milliGRAM(s) Oral two times a day  QUEtiapine 50 milliGRAM(s) Oral at bedtime  triamcinolone 0.1% Cream 1 Application(s) Topical two times a day    MEDICATIONS  (PRN):  acetaminophen     Tablet .. 650 milliGRAM(s) Oral every 6 hours PRN Temp greater or equal to 38C (100.4F), Mild Pain (1 - 3)  benzocaine/menthol Lozenge 1 Lozenge Oral every 3 hours PRN Sore Throat  ondansetron Injectable 4 milliGRAM(s) IV Push every 8 hours PRN Nausea and/or Vomiting      Allergies    No Known Allergies    Intolerances        LABS:                        13.0   6.38  )-----------( 300      ( 10 Mar 2025 05:00 )             39.1     03-11    139  |  106  |  14  ----------------------------<  108[H]  3.6   |  22  |  0.83    Ca    9.3      11 Mar 2025 06:14  Phos  3.2     03-11  Mg     1.7     -09    TPro  7.3  /  Alb  3.5  /  TBili  0.3  /  DBili  x   /  AST  13  /  ALT  18  /  AlkPhos  80  03-10      Urinalysis Basic - ( 11 Mar 2025 10:17 )    Color: Yellow / Appearance: Clear / S.006 / pH: x  Gluc: x / Ketone: Negative mg/dL  / Bili: Negative / Urobili: 0.2 mg/dL   Blood: x / Protein: Negative mg/dL / Nitrite: Negative   Leuk Esterase: Trace / RBC: 0 /HPF / WBC 1 /HPF   Sq Epi: x / Non Sq Epi: x / Bacteria: Occasional /HPF            CAPILLARY BLOOD GLUCOSE            RADIOLOGY & ADDITIONAL TESTS:    CXR:    < from: Xray Chest 2 Views PA/Lat (25 @ 19:07) >  IMPRESSION:    No acute cardiopulmonary disease process.      < end of copied text >  Ct scan chest:    ekg;    echo:< from: TTE W or WO Ultrasound Enhancing Agent (24 @ 10:16) >  CONCLUSIONS:      1. Left ventricular systolic function is normal with an ejection fraction visually estimated at 65 to 70 %.   2. There is mild (grade 1) left ventricular diastolic dysfunction.   3. Normal right ventricular cavity size and normal wall thickness,.   4. Moderate aortic stenosis.   5. Mild mitral valve stenosis.   6. No pericardial effusion seen.   7. Compared to the transthoracic echocardiogram performed on 2023, moderate AS is noted in today's study.    < end of copied text >

## 2025-03-11 NOTE — PROGRESS NOTE ADULT - PROBLEM SELECTOR PLAN 3
C/o several days of sore throat, dry cough, nasal congestion, and left ear pain  -Flu/COVID/RSV PCR negative  -CXR no PNA   -C/w Augmentin 875mg BID, flonase BID   -C/w Benzocaine lozenge PRN  -C/w Protonix for possible component of GERD  -Supportive care

## 2025-03-11 NOTE — PROGRESS NOTE ADULT - SUBJECTIVE AND OBJECTIVE BOX
Patient is a 89y old  Female who presents with a chief complaint of HTN urgency, Hypokalemia, URI, Type 2 MI (10 Mar 2025 13:23)    pt seen in ed tele [ x ], reg med floor [   ], bed [x  ], chair at bedside [   ], a+o x3 [ x ], lethargic [  ],    nad [ x ]      Allergies    No Known Allergies        Vitals    T(F): 98.2 (03-11-25 @ 05:11), Max: 98.6 (03-10-25 @ 17:21)  HR: 60 (03-11-25 @ 05:11) (60 - 77)  BP: 145/64 (03-11-25 @ 05:11) (109/52 - 148/68)  RR: 18 (03-11-25 @ 05:11) (16 - 18)  SpO2: 94% (03-11-25 @ 05:11) (94% - 100%)  Wt(kg): --  CAPILLARY BLOOD GLUCOSE          Labs                          13.0   6.38  )-----------( 300      ( 10 Mar 2025 05:00 )             39.1       03-10    134[L]  |  106  |  9   ----------------------------<  172[H]  4.5   |  22  |  0.93    Ca    9.2      10 Mar 2025 05:00  Phos  1.2     03-09  Mg     1.7     03-09    TPro  7.3  /  Alb  3.5  /  TBili  0.3  /  DBili  x   /  AST  13  /  ALT  18  /  AlkPhos  80  03-10          Troponin I, High Sensitivity Result: 228.9 ng/L (03-10-25 @ 05:00)  Troponin I, High Sensitivity Result: 242.9 ng/L (03-09-25 @ 22:43)  Troponin I, High Sensitivity Result: 228.3 ng/L (03-09-25 @ 18:31)        Radiology Results      Meds    MEDICATIONS  (STANDING):  amLODIPine   Tablet 5 milliGRAM(s) Oral daily  amoxicillin  875 milliGRAM(s)/clavulanate 1 Tablet(s) Oral every 12 hours  atorvastatin 20 milliGRAM(s) Oral at bedtime  donepezil 10 milliGRAM(s) Oral at bedtime  enoxaparin Injectable 40 milliGRAM(s) SubCutaneous every 24 hours  fluticasone propionate 50 MICROgram(s)/spray Nasal Spray 1 Spray(s) Both Nostrils two times a day  latanoprost 0.005% Ophthalmic Solution 1 Drop(s) Both EYES at bedtime  losartan 100 milliGRAM(s) Oral daily  metoprolol tartrate 12.5 milliGRAM(s) Oral two times a day  pantoprazole    Tablet 40 milliGRAM(s) Oral two times a day  QUEtiapine 50 milliGRAM(s) Oral at bedtime  triamcinolone 0.1% Cream 1 Application(s) Topical two times a day      MEDICATIONS  (PRN):  acetaminophen     Tablet .. 650 milliGRAM(s) Oral every 6 hours PRN Temp greater or equal to 38C (100.4F), Mild Pain (1 - 3)  benzocaine/menthol Lozenge 1 Lozenge Oral every 3 hours PRN Sore Throat  ondansetron Injectable 4 milliGRAM(s) IV Push every 8 hours PRN Nausea and/or Vomiting      Physical Exam    Neuro :  no focal deficits  Respiratory: CTA B/L  CV: RRR, S1S2, no murmurs,   Abdominal: Soft, NT, ND +BS,  Extremities: No edema, + peripheral pulses      ASSESSMENT    uncontrolled htn with urgency,   troponinemia.    r/o acs,   hypokalemia,   uri,   sore throat,   h/o HTN,   HLD,   Breast CA s/p R mastectomy,   Glaucoma,   Vertigo,   Migraine,   Renal cyst,   Fatty liver,   GERD   Calculus of bile duct s/p ERCP,  S/P cholecystectomy  S/P knee replacement  H/O eye surgery        PLAN     cont tele,   acs protocol,   aspirin, statin,   resume antihypertensives   trop x3 elevated noted above   cardio cons  f/u echo   supplemented potassium   potassium wnl  start augmentin 875 bid x 7 days,   start cepacol yefri,   cont current meds          Patient is a 89y old  Female who presents with a chief complaint of HTN urgency, Hypokalemia, URI, Type 2 MI (10 Mar 2025 13:23)    pt seen in tele [ x ], reg med floor [   ], bed [x  ], chair at bedside [   ], a+o x3 [ x ], lethargic [  ],    nad [ x ]      Allergies    No Known Allergies        Vitals    T(F): 98.2 (03-11-25 @ 05:11), Max: 98.6 (03-10-25 @ 17:21)  HR: 60 (03-11-25 @ 05:11) (60 - 77)  BP: 145/64 (03-11-25 @ 05:11) (109/52 - 148/68)  RR: 18 (03-11-25 @ 05:11) (16 - 18)  SpO2: 94% (03-11-25 @ 05:11) (94% - 100%)  Wt(kg): --  CAPILLARY BLOOD GLUCOSE          Labs                          13.0   6.38  )-----------( 300      ( 10 Mar 2025 05:00 )             39.1       03-10    134[L]  |  106  |  9   ----------------------------<  172[H]  4.5   |  22  |  0.93    Ca    9.2      10 Mar 2025 05:00  Phos  1.2     03-09  Mg     1.7     03-09    TPro  7.3  /  Alb  3.5  /  TBili  0.3  /  DBili  x   /  AST  13  /  ALT  18  /  AlkPhos  80  03-10          Troponin I, High Sensitivity Result: 228.9 ng/L (03-10-25 @ 05:00)  Troponin I, High Sensitivity Result: 242.9 ng/L (03-09-25 @ 22:43)  Troponin I, High Sensitivity Result: 228.3 ng/L (03-09-25 @ 18:31)        Radiology Results      Meds    MEDICATIONS  (STANDING):  amLODIPine   Tablet 5 milliGRAM(s) Oral daily  amoxicillin  875 milliGRAM(s)/clavulanate 1 Tablet(s) Oral every 12 hours  atorvastatin 20 milliGRAM(s) Oral at bedtime  donepezil 10 milliGRAM(s) Oral at bedtime  enoxaparin Injectable 40 milliGRAM(s) SubCutaneous every 24 hours  fluticasone propionate 50 MICROgram(s)/spray Nasal Spray 1 Spray(s) Both Nostrils two times a day  latanoprost 0.005% Ophthalmic Solution 1 Drop(s) Both EYES at bedtime  losartan 100 milliGRAM(s) Oral daily  metoprolol tartrate 12.5 milliGRAM(s) Oral two times a day  pantoprazole    Tablet 40 milliGRAM(s) Oral two times a day  QUEtiapine 50 milliGRAM(s) Oral at bedtime  triamcinolone 0.1% Cream 1 Application(s) Topical two times a day      MEDICATIONS  (PRN):  acetaminophen     Tablet .. 650 milliGRAM(s) Oral every 6 hours PRN Temp greater or equal to 38C (100.4F), Mild Pain (1 - 3)  benzocaine/menthol Lozenge 1 Lozenge Oral every 3 hours PRN Sore Throat  ondansetron Injectable 4 milliGRAM(s) IV Push every 8 hours PRN Nausea and/or Vomiting      Physical Exam    Neuro :  no focal deficits  Respiratory: CTA B/L  CV: RRR, S1S2, no murmurs,   Abdominal: Soft, NT, ND +BS,  Extremities: No edema, + peripheral pulses      ASSESSMENT    uncontrolled htn with urgency,   troponinemia.    r/o acs,   hypokalemia,   uri,   sore throat,   h/o HTN,   HLD,   Breast CA s/p R mastectomy,   Glaucoma,   Vertigo,   Migraine,   Renal cyst,   Fatty liver,   GERD   Calculus of bile duct s/p ERCP,  S/P cholecystectomy  S/P knee replacement  H/O eye surgery        PLAN     cont tele,   acs protocol,   aspirin, statin,   trop x3 elevated noted above   cardio   cont ace,norvasc,  d/c hctz.   Add low dose b blocker.  f/u echo   bp improved   supplemented potassium   potassium wnl  cont augmentin 875 bid x 7 days,   cont cepacol yefri,   pulm f/u   Analgesics prn  Robitussin 10 cc po tid PRN  IVF  Bronchodilators prn.   gi f/u   Abdominal pain in this patient can be due to Peptic ulcer disease, Esophagitis, Viral gastritis, GERD  Sigmoid wall thickening seen on CT-Scan dated 06/2024  R/o colonic neoplasm  Hepatic steatosis  cont Protonix 40mg BID  Avoid NSAID  Colonoscopy out patient  cont current meds

## 2025-03-11 NOTE — PROGRESS NOTE ADULT - SUBJECTIVE AND OBJECTIVE BOX
[   ] ICU                                          [   ] CCU                                      [ X  ] Medical Floor    Patient is a 89 year old female with epigastric abdominal pain. GI consulted to evaluate.         89 year old female from home, ambulates with cane/walker, with past medical history significant for HTN, Hyperlipidemia, Breast CA s/p R mastectomy, Glaucoma, Calculus of bile duct s/p ERCP, Vertigo, Migraine headaches, Renal cyst, Fatty liver, and GERD presented to the emergency room with 5 days history of progressively worsening intermittent, 7/10 intensity burning epigastric pain radiating up to her chest associated with burping, bloating, nausea and poor po intake due to loss of appetite. Patient also c/o sore throat, nasal congestion, Right ear pain, and dry cough. Patient's granddaughter reports that patient has very sensitive stomach. Patient denies vomiting, hematemesis, hematochezia, melena, fever, chills, chest pain, SOB, hematuria, dysuria or diarrhea.     Patient appears comfortable. No new complaints reported, No nausea, vomiting, hematemesis, hematochezia, melena, fever, chills, chest pain, SOB, cough or diarrhea reported.       Prior Colonoscopy:  Unknown      PAST MEDICAL HISTORY    HTN (hypertension)    Hyperlipidemia     Glaucoma     Calculus of bile duct    Cerebellar ataxia    Vertigo    Migraine headache    Renal cyst    Fatty liver    GERD (gastroesophageal reflux disease)    Breast cancer        PAST SURGICAL HISTORY    Cholecystectomy     Knee replacement    Eye surgery    Right mastectomy        Allergies    No Known Allergies    Intolerances  None         SOCIAL HISTORY  Advanced Directives:       [ X ] Full Code       [  ] DNR  Marital Status:         [  ] M      [ X ] S      [  ] D       [  ] W  Children:       [ X ] Yes      [  ] No  Occupation:        [  ] Employed       [  X] Unemployed       [  ] Retired  Diet:       [  X] Regular       [  ] PEG feeding          [  ] NG tube feeding  Drug Use:           [ X ] No          [  ] Yes  Alcohol:           [X  ] No             [  ] Yes (socially)         [  ] Yes (chronic)  Tobacco:           [  ] Yes           [ X ] No      FAMILY HISTORY  [ X ] Heart Disease            [ X ] Diabetes             [ X ] HTN             [  ] Colon Cancer             [  ] Stomach Cancer              [  ] Pancreatic Cancer        VITALS   Vital Signs Last 24 Hrs  T(C): 36.5 (03-11-25 @ 07:13), Max: 37 (03-10-25 @ 17:21)  T(F): 97.7 (03-11-25 @ 07:13), Max: 98.6 (03-10-25 @ 17:21)  HR: 57 (03-11-25 @ 07:13) (57 - 72)  BP: 128/56 (03-11-25 @ 07:13) (109/52 - 145/64)   RR: 18 (03-11-25 @ 07:13) (18 - 18)  SpO2: 98% (03-11-25 @ 07:13) (94% - 100%)        MEDICATIONS  (STANDING):  amLODIPine   Tablet 5 milliGRAM(s) Oral daily  amoxicillin  875 milliGRAM(s)/clavulanate 1 Tablet(s) Oral every 12 hours  atorvastatin 20 milliGRAM(s) Oral at bedtime  donepezil 10 milliGRAM(s) Oral at bedtime  enoxaparin Injectable 40 milliGRAM(s) SubCutaneous every 24 hours  fluticasone propionate 50 MICROgram(s)/spray Nasal Spray 1 Spray(s) Both Nostrils two times a day  latanoprost 0.005% Ophthalmic Solution 1 Drop(s) Both EYES at bedtime  losartan 100 milliGRAM(s) Oral daily  pantoprazole    Tablet 40 milliGRAM(s) Oral two times a day  QUEtiapine 50 milliGRAM(s) Oral at bedtime  triamcinolone 0.1% Cream 1 Application(s) Topical two times a day    MEDICATIONS  (PRN):  acetaminophen     Tablet .. 650 milliGRAM(s) Oral every 6 hours PRN Temp greater or equal to 38C (100.4F), Mild Pain (1 - 3)  benzocaine/menthol Lozenge 1 Lozenge Oral every 3 hours PRN Sore Throat  ondansetron Injectable 4 milliGRAM(s) IV Push every 8 hours PRN Nausea and/or Vomiting                            13.0   6.38  )-----------( 300      ( 10 Mar 2025 05:00 )             39.1       03-11    139  |  106  |  14  ----------------------------<  108[H]  3.6   |  22  |  0.83    Ca    9.3      11 Mar 2025 06:14  Phos  3.2     03-11  Mg     1.7     03-09    TPro  7.3  /  Alb  3.5  /  TBili  0.3  /  DBili  x   /  AST  13  /  ALT  18  /  AlkPhos  80  03-10

## 2025-03-11 NOTE — PROGRESS NOTE ADULT - PROBLEM SELECTOR PLAN 5
H/o GERD not on PPI or H2 blocker  -Reports very sensitive stomach and epigastric pain, dyspepsia, and dry cough  -Suspect some URI symptoms may be confounded by untreated GERD  -C/w Protonix 40mg PO BID

## 2025-03-11 NOTE — PROGRESS NOTE ADULT - PROBLEM SELECTOR PLAN 2
Possibly due to uncontrolled HTN  -Troponins 228-->242-->228  -S/p ASA 81mg  -C/w Atorvastatin 20mg   - F/u Echo read

## 2025-03-11 NOTE — PROGRESS NOTE ADULT - SUBJECTIVE AND OBJECTIVE BOX
Patient is a 89y old  Female who presents with a chief complaint of HTN urgency, Hypokalemia, URI, Type 2 MI (11 Mar 2025 11:56)      INTERVAL HPI/OVERNIGHT EVENTS: no new complaints    MEDICATIONS  (STANDING):  amLODIPine   Tablet 5 milliGRAM(s) Oral daily  amoxicillin  875 milliGRAM(s)/clavulanate 1 Tablet(s) Oral every 12 hours  atorvastatin 20 milliGRAM(s) Oral at bedtime  donepezil 10 milliGRAM(s) Oral at bedtime  enoxaparin Injectable 40 milliGRAM(s) SubCutaneous every 24 hours  fluticasone propionate 50 MICROgram(s)/spray Nasal Spray 1 Spray(s) Both Nostrils two times a day  latanoprost 0.005% Ophthalmic Solution 1 Drop(s) Both EYES at bedtime  losartan 100 milliGRAM(s) Oral daily  pantoprazole    Tablet 40 milliGRAM(s) Oral two times a day  QUEtiapine 50 milliGRAM(s) Oral at bedtime  triamcinolone 0.1% Cream 1 Application(s) Topical two times a day    MEDICATIONS  (PRN):  acetaminophen     Tablet .. 650 milliGRAM(s) Oral every 6 hours PRN Temp greater or equal to 38C (100.4F), Mild Pain (1 - 3)  benzocaine/menthol Lozenge 1 Lozenge Oral every 3 hours PRN Sore Throat  ondansetron Injectable 4 milliGRAM(s) IV Push every 8 hours PRN Nausea and/or Vomiting      __________________________________________________  REVIEW OF SYSTEMS:    CONSTITUTIONAL: No fever,   EYES: no acute visual disturbances  NECK: No pain or stiffness  RESPIRATORY: No cough; No shortness of breath  CARDIOVASCULAR: No chest pain, no palpitations  GASTROINTESTINAL: No pain. No nausea or vomiting; No diarrhea   NEUROLOGICAL: No headache or numbness, no tremors  MUSCULOSKELETAL: No joint pain, no muscle pain  GENITOURINARY: no dysuria, no frequency, no hesitancy  PSYCHIATRY: no depression , no anxiety  ALL OTHER  ROS negative      Vital Signs Last 24 Hrs  T(C): 36.6 (11 Mar 2025 11:06), Max: 37 (10 Mar 2025 17:21)  T(F): 97.9 (11 Mar 2025 11:06), Max: 98.6 (10 Mar 2025 17:21)  HR: 58 (11 Mar 2025 11:06) (57 - 72)  BP: 103/42 (11 Mar 2025 11:06) (103/42 - 145/64)  BP(mean): --  RR: 18 (11 Mar 2025 11:06) (18 - 18)  SpO2: 95% (11 Mar 2025 11:06) (94% - 100%)    Parameters below as of 11 Mar 2025 11:06  Patient On (Oxygen Delivery Method): room air        ________________________________________________  PHYSICAL EXAM:  GENERAL: NAD  HEENT: Normocephalic;  conjunctivae and sclerae clear; moist mucous membranes;   NECK : supple  CHEST/LUNG: Clear to auscultation bilaterally with good air entry   HEART: S1 S2  regular; no murmurs, gallops or rubs  ABDOMEN: Soft, Nontender, Nondistended; Bowel sounds present  EXTREMITIES: no cyanosis; no edema; no calf tenderness  SKIN: warm and dry; no rash  NERVOUS SYSTEM:  Awake and alert; Oriented  to place, person and time ; no new deficits    _________________________________________________  LABS:                        13.0   6.38  )-----------( 300      ( 10 Mar 2025 05:00 )             39.1     03-11    139  |  106  |  14  ----------------------------<  108[H]  3.6   |  22  |  0.83    Ca    9.3      11 Mar 2025 06:14  Phos  3.2     03-11  Mg     1.7     03-09    TPro  7.3  /  Alb  3.5  /  TBili  0.3  /  DBili  x   /  AST  13  /  ALT  18  /  AlkPhos  80  03-10      Urinalysis Basic - ( 11 Mar 2025 10:17 )    Color: Yellow / Appearance: Clear / S.006 / pH: x  Gluc: x / Ketone: Negative mg/dL  / Bili: Negative / Urobili: 0.2 mg/dL   Blood: x / Protein: Negative mg/dL / Nitrite: Negative   Leuk Esterase: Trace / RBC: 0 /HPF / WBC 1 /HPF   Sq Epi: x / Non Sq Epi: x / Bacteria: Occasional /HPF      CAPILLARY BLOOD GLUCOSE          RADIOLOGY & ADDITIONAL TESTS:    Imaging Personally Reviewed:  YES/NO    Consultant(s) Notes Reviewed:   YES/ No    Care Discussed with Consultants :     Plan of care was discussed with patient and /or primary care giver; all questions and concerns were addressed and care was aligned with patient's wishes.       Patient is a 89y old  Female who presents with a chief complaint of HTN urgency, Hypokalemia, URI, Type 2 MI (11 Mar 2025 11:56)      INTERVAL HPI/OVERNIGHT EVENTS: Pt seen at bedside with Kinyarwanda  Cristine ID [id number],  no new complaints    MEDICATIONS  (STANDING):  amLODIPine   Tablet 5 milliGRAM(s) Oral daily  amoxicillin  875 milliGRAM(s)/clavulanate 1 Tablet(s) Oral every 12 hours  atorvastatin 20 milliGRAM(s) Oral at bedtime  donepezil 10 milliGRAM(s) Oral at bedtime  enoxaparin Injectable 40 milliGRAM(s) SubCutaneous every 24 hours  fluticasone propionate 50 MICROgram(s)/spray Nasal Spray 1 Spray(s) Both Nostrils two times a day  latanoprost 0.005% Ophthalmic Solution 1 Drop(s) Both EYES at bedtime  losartan 100 milliGRAM(s) Oral daily  pantoprazole    Tablet 40 milliGRAM(s) Oral two times a day  QUEtiapine 50 milliGRAM(s) Oral at bedtime  triamcinolone 0.1% Cream 1 Application(s) Topical two times a day    MEDICATIONS  (PRN):  acetaminophen     Tablet .. 650 milliGRAM(s) Oral every 6 hours PRN Temp greater or equal to 38C (100.4F), Mild Pain (1 - 3)  benzocaine/menthol Lozenge 1 Lozenge Oral every 3 hours PRN Sore Throat  ondansetron Injectable 4 milliGRAM(s) IV Push every 8 hours PRN Nausea and/or Vomiting  __________________________________________________  REVIEW OF SYSTEMS:    CONSTITUTIONAL: No fever,   EYES: no acute visual disturbances  NECK: No pain or stiffness  RESPIRATORY: dry cough; No shortness of breath  CARDIOVASCULAR: No chest pain, no palpitations  GASTROINTESTINAL: No pain. No nausea or vomiting; No diarrhea   NEUROLOGICAL: No headache or numbness, no tremors  MUSCULOSKELETAL: No joint pain, no muscle pain  GENITOURINARY: no dysuria, no frequency, no hesitancy  PSYCHIATRY: no depression , no anxiety  ALL OTHER  ROS negative      Vital Signs Last 24 Hrs  T(C): 36.6 (11 Mar 2025 11:06), Max: 37 (10 Mar 2025 17:21)  T(F): 97.9 (11 Mar 2025 11:06), Max: 98.6 (10 Mar 2025 17:21)  HR: 58 (11 Mar 2025 11:06) (57 - 72)  BP: 103/42 (11 Mar 2025 11:06) (103/42 - 145/64)  BP(mean): --  RR: 18 (11 Mar 2025 11:) (18 - 18)  SpO2: 95% (11 Mar 2025 11:06) (94% - 100%)    Parameters below as of 11 Mar 2025 11:06  Patient On (Oxygen Delivery Method): room air  ________________________________________________  PHYSICAL EXAM:  GENERAL: NAD  HEENT: Normocephalic;  conjunctivae and sclerae clear; moist mucous membranes;   NECK : supple  CHEST/LUNG: Clear to auscultation bilaterally with good air entry   HEART: S1 S2  regular; no murmurs, gallops or rubs  ABDOMEN: Soft, Nontender, Nondistended; Bowel sounds present  EXTREMITIES: no cyanosis; no edema; no calf tenderness  SKIN: warm and dry; no rash  NERVOUS SYSTEM:  Awake and alert; Oriented  to place, person and time ; no new deficits    _________________________________________________  LABS:                        13.0   6.38  )-----------( 300      ( 10 Mar 2025 05:00 )             39.1     03-11    139  |  106  |  14  ----------------------------<  108[H]  3.6   |  22  |  0.83    Ca    9.3      11 Mar 2025 06:14  Phos  3.2     03-11  Mg     1.7     03-09    TPro  7.3  /  Alb  3.5  /  TBili  0.3  /  DBili  x   /  AST  13  /  ALT  18  /  AlkPhos  80  03-10    Urinalysis Basic - ( 11 Mar 2025 10:17 )    Color: Yellow / Appearance: Clear / S.006 / pH: x  Gluc: x / Ketone: Negative mg/dL  / Bili: Negative / Urobili: 0.2 mg/dL   Blood: x / Protein: Negative mg/dL / Nitrite: Negative   Leuk Esterase: Trace / RBC: 0 /HPF / WBC 1 /HPF   Sq Epi: x / Non Sq Epi: x / Bacteria: Occasional /HPF    RADIOLOGY & ADDITIONAL TESTS:     < from: Xray Chest 2 Views PA/Lat (25 @ 19:07) >  ACC: [id number] EXAM:  XR CHEST PA LAT 2V   ORDERED BY: JOSE A MARTINEZ     PROCEDURE DATE:  2025        INTERPRETATION:  TECHNIQUE: 2 views of the chest.    COMPARISON: None    CLINICAL HISTORY: uri    FINDINGS:    Frontal and lateral views of the chest demonstrates the lungs to be   clear. The cardiomediastinal silhouette is unremarkable. No acute osseous   abnormalities.    IMPRESSION:    No acute cardiopulmonary disease process.    --- End of Report ---    Imaging Personally Reviewed:  YES    Consultant(s) Notes Reviewed:   YES      Plan of care was discussed with patient and /or primary care giver; all questions and concerns were addressed and care was aligned with patient's wishes.

## 2025-03-12 ENCOUNTER — TRANSCRIPTION ENCOUNTER (OUTPATIENT)
Age: 89
End: 2025-03-12

## 2025-03-12 VITALS
DIASTOLIC BLOOD PRESSURE: 49 MMHG | RESPIRATION RATE: 18 BRPM | OXYGEN SATURATION: 98 % | SYSTOLIC BLOOD PRESSURE: 132 MMHG | HEART RATE: 66 BPM | TEMPERATURE: 97 F

## 2025-03-12 LAB
ANION GAP SERPL CALC-SCNC: 9 MMOL/L — SIGNIFICANT CHANGE UP (ref 5–17)
BUN SERPL-MCNC: 18 MG/DL — SIGNIFICANT CHANGE UP (ref 7–18)
CALCIUM SERPL-MCNC: 9.5 MG/DL — SIGNIFICANT CHANGE UP (ref 8.4–10.5)
CHLORIDE SERPL-SCNC: 110 MMOL/L — HIGH (ref 96–108)
CO2 SERPL-SCNC: 23 MMOL/L — SIGNIFICANT CHANGE UP (ref 22–31)
CREAT SERPL-MCNC: 0.86 MG/DL — SIGNIFICANT CHANGE UP (ref 0.5–1.3)
EGFR: 65 ML/MIN/1.73M2 — SIGNIFICANT CHANGE UP
EGFR: 65 ML/MIN/1.73M2 — SIGNIFICANT CHANGE UP
GLUCOSE SERPL-MCNC: 106 MG/DL — HIGH (ref 70–99)
HCT VFR BLD CALC: 36.2 % — SIGNIFICANT CHANGE UP (ref 34.5–45)
HGB BLD-MCNC: 12 G/DL — SIGNIFICANT CHANGE UP (ref 11.5–15.5)
MAGNESIUM SERPL-MCNC: 2 MG/DL — SIGNIFICANT CHANGE UP (ref 1.6–2.6)
MCHC RBC-ENTMCNC: 30.3 PG — SIGNIFICANT CHANGE UP (ref 27–34)
MCHC RBC-ENTMCNC: 33.1 G/DL — SIGNIFICANT CHANGE UP (ref 32–36)
MCV RBC AUTO: 91.4 FL — SIGNIFICANT CHANGE UP (ref 80–100)
NRBC BLD AUTO-RTO: 0 /100 WBCS — SIGNIFICANT CHANGE UP (ref 0–0)
PHOSPHATE SERPL-MCNC: 3.3 MG/DL — SIGNIFICANT CHANGE UP (ref 2.5–4.5)
PLATELET # BLD AUTO: 259 K/UL — SIGNIFICANT CHANGE UP (ref 150–400)
POTASSIUM SERPL-MCNC: 3.7 MMOL/L — SIGNIFICANT CHANGE UP (ref 3.5–5.3)
POTASSIUM SERPL-SCNC: 3.7 MMOL/L — SIGNIFICANT CHANGE UP (ref 3.5–5.3)
RBC # BLD: 3.96 M/UL — SIGNIFICANT CHANGE UP (ref 3.8–5.2)
RBC # FLD: 13.3 % — SIGNIFICANT CHANGE UP (ref 10.3–14.5)
SODIUM SERPL-SCNC: 142 MMOL/L — SIGNIFICANT CHANGE UP (ref 135–145)
WBC # BLD: 8.04 K/UL — SIGNIFICANT CHANGE UP (ref 3.8–10.5)
WBC # FLD AUTO: 8.04 K/UL — SIGNIFICANT CHANGE UP (ref 3.8–10.5)

## 2025-03-12 PROCEDURE — 80053 COMPREHEN METABOLIC PANEL: CPT

## 2025-03-12 PROCEDURE — 99285 EMERGENCY DEPT VISIT HI MDM: CPT

## 2025-03-12 PROCEDURE — 96374 THER/PROPH/DIAG INJ IV PUSH: CPT

## 2025-03-12 PROCEDURE — 87637 SARSCOV2&INF A&B&RSV AMP PRB: CPT

## 2025-03-12 PROCEDURE — 36415 COLL VENOUS BLD VENIPUNCTURE: CPT

## 2025-03-12 PROCEDURE — 84100 ASSAY OF PHOSPHORUS: CPT

## 2025-03-12 PROCEDURE — 96375 TX/PRO/DX INJ NEW DRUG ADDON: CPT

## 2025-03-12 PROCEDURE — 83690 ASSAY OF LIPASE: CPT

## 2025-03-12 PROCEDURE — 94640 AIRWAY INHALATION TREATMENT: CPT

## 2025-03-12 PROCEDURE — 85027 COMPLETE CBC AUTOMATED: CPT

## 2025-03-12 PROCEDURE — 93306 TTE W/DOPPLER COMPLETE: CPT

## 2025-03-12 PROCEDURE — 80061 LIPID PANEL: CPT

## 2025-03-12 PROCEDURE — 84484 ASSAY OF TROPONIN QUANT: CPT

## 2025-03-12 PROCEDURE — 93005 ELECTROCARDIOGRAM TRACING: CPT

## 2025-03-12 PROCEDURE — 80048 BASIC METABOLIC PNL TOTAL CA: CPT

## 2025-03-12 PROCEDURE — 71046 X-RAY EXAM CHEST 2 VIEWS: CPT

## 2025-03-12 PROCEDURE — 85025 COMPLETE CBC W/AUTO DIFF WBC: CPT

## 2025-03-12 PROCEDURE — 83735 ASSAY OF MAGNESIUM: CPT

## 2025-03-12 PROCEDURE — 81001 URINALYSIS AUTO W/SCOPE: CPT

## 2025-03-12 PROCEDURE — 83036 HEMOGLOBIN GLYCOSYLATED A1C: CPT

## 2025-03-12 RX ORDER — FLUTICASONE PROPIONATE 50 UG/1
1 SPRAY, METERED NASAL
Qty: 1 | Refills: 0
Start: 2025-03-12 | End: 2025-04-10

## 2025-03-12 RX ORDER — TRIAMCINOLONE ACETONIDE 1 MG/G
1 CREAM TOPICAL
Qty: 1 | Refills: 0
Start: 2025-03-12 | End: 2025-03-16

## 2025-03-12 RX ORDER — AMOXICILLIN AND CLAVULANATE POTASSIUM 500; 125 MG/1; MG/1
1 TABLET, FILM COATED ORAL
Qty: 12 | Refills: 0
Start: 2025-03-12 | End: 2025-03-17

## 2025-03-12 RX ORDER — LOSARTAN POTASSIUM 100 MG/1
1 TABLET, FILM COATED ORAL
Refills: 2 | DISCHARGE

## 2025-03-12 RX ORDER — HYDROCHLOROTHIAZIDE 50 MG/1
1 TABLET ORAL
Refills: 1 | DISCHARGE

## 2025-03-12 RX ORDER — AMOXICILLIN AND CLAVULANATE POTASSIUM 500; 125 MG/1; MG/1
1 TABLET, FILM COATED ORAL
Qty: 10 | Refills: 0
Start: 2025-03-12 | End: 2025-03-16

## 2025-03-12 RX ORDER — TERBINAFINE HYDROCHLORIDE 250 MG/1
1 TABLET ORAL
Refills: 0 | DISCHARGE

## 2025-03-12 RX ORDER — LOSARTAN POTASSIUM 100 MG/1
1 TABLET, FILM COATED ORAL
Qty: 30 | Refills: 0
Start: 2025-03-12 | End: 2025-04-10

## 2025-03-12 RX ADMIN — Medication 1 LOZENGE: at 05:55

## 2025-03-12 RX ADMIN — LOSARTAN POTASSIUM 100 MILLIGRAM(S): 100 TABLET, FILM COATED ORAL at 05:55

## 2025-03-12 RX ADMIN — FLUTICASONE PROPIONATE 1 SPRAY(S): 50 SPRAY, METERED NASAL at 05:55

## 2025-03-12 RX ADMIN — TRIAMCINOLONE ACETONIDE 1 APPLICATION(S): 1 CREAM TOPICAL at 05:55

## 2025-03-12 RX ADMIN — AMOXICILLIN AND CLAVULANATE POTASSIUM 1 TABLET(S): 500; 125 TABLET, FILM COATED ORAL at 05:54

## 2025-03-12 RX ADMIN — Medication 40 MILLIGRAM(S): at 05:55

## 2025-03-12 RX ADMIN — AMLODIPINE BESYLATE 5 MILLIGRAM(S): 10 TABLET ORAL at 05:54

## 2025-03-12 NOTE — PROGRESS NOTE ADULT - SUBJECTIVE AND OBJECTIVE BOX
[   ] ICU                                          [   ] CCU                                      [ X  ] Medical Floor    Patient is a 89 year old female with epigastric abdominal pain. GI consulted to evaluate.         89 year old female from home, ambulates with cane/walker, with past medical history significant for HTN, Hyperlipidemia, Breast CA s/p R mastectomy, Glaucoma, Calculus of bile duct s/p ERCP, Vertigo, Migraine headaches, Renal cyst, Fatty liver, and GERD presented to the emergency room with 5 days history of progressively worsening intermittent, 7/10 intensity burning epigastric pain radiating up to her chest associated with burping, bloating, nausea and poor po intake due to loss of appetite. Patient also c/o sore throat, nasal congestion, Right ear pain, and dry cough. Patient's granddaughter reports that patient has very sensitive stomach. Patient denies vomiting, hematemesis, hematochezia, melena, fever, chills, chest pain, SOB, hematuria, dysuria or diarrhea.     Patient appears comfortable. No new complaints reported, No nausea, vomiting, hematemesis, hematochezia, melena, fever, chills, chest pain, SOB, cough or diarrhea reported.       Prior Colonoscopy:  Unknown      PAST MEDICAL HISTORY    HTN (hypertension)    Hyperlipidemia     Glaucoma     Calculus of bile duct    Cerebellar ataxia    Vertigo    Migraine headache    Renal cyst    Fatty liver    GERD (gastroesophageal reflux disease)    Breast cancer        PAST SURGICAL HISTORY    Cholecystectomy     Knee replacement    Eye surgery    Right mastectomy        Allergies    No Known Allergies    Intolerances  None         SOCIAL HISTORY  Advanced Directives:       [ X ] Full Code       [  ] DNR  Marital Status:         [  ] M      [ X ] S      [  ] D       [  ] W  Children:       [ X ] Yes      [  ] No  Occupation:        [  ] Employed       [  X] Unemployed       [  ] Retired  Diet:       [  X] Regular       [  ] PEG feeding          [  ] NG tube feeding  Drug Use:           [ X ] No          [  ] Yes  Alcohol:           [X  ] No             [  ] Yes (socially)         [  ] Yes (chronic)  Tobacco:           [  ] Yes           [ X ] No      FAMILY HISTORY  [ X ] Heart Disease            [ X ] Diabetes             [ X ] HTN             [  ] Colon Cancer             [  ] Stomach Cancer              [  ] Pancreatic Cancer        VITALS   Vital Signs Last 24 Hrs  T(C): 36.2 (03-12-25 @ 11:16), Max: 36.7 (03-12-25 @ 07:36)  T(F): 97.2 (03-12-25 @ 11:16), Max: 98.1 (03-12-25 @ 07:36)  HR: 66 (03-12-25 @ 11:16) (60 - 66)  BP: 132/49 (03-12-25 @ 11:16) (122/57 - 147/63)   RR: 18 (03-12-25 @ 11:16) (18 - 19)  SpO2: 98% (03-12-25 @ 11:16) (95% - 100%)      MEDICATIONS  (STANDING):  amLODIPine   Tablet 5 milliGRAM(s) Oral daily  amoxicillin  875 milliGRAM(s)/clavulanate 1 Tablet(s) Oral every 12 hours  atorvastatin 20 milliGRAM(s) Oral at bedtime  donepezil 10 milliGRAM(s) Oral at bedtime  enoxaparin Injectable 40 milliGRAM(s) SubCutaneous every 24 hours  fluticasone propionate 50 MICROgram(s)/spray Nasal Spray 1 Spray(s) Both Nostrils two times a day  latanoprost 0.005% Ophthalmic Solution 1 Drop(s) Both EYES at bedtime  losartan 100 milliGRAM(s) Oral daily  pantoprazole    Tablet 40 milliGRAM(s) Oral two times a day  QUEtiapine 50 milliGRAM(s) Oral at bedtime  triamcinolone 0.1% Cream 1 Application(s) Topical two times a day    MEDICATIONS  (PRN):  acetaminophen     Tablet .. 650 milliGRAM(s) Oral every 6 hours PRN Temp greater or equal to 38C (100.4F), Mild Pain (1 - 3)  benzocaine/menthol Lozenge 1 Lozenge Oral every 3 hours PRN Sore Throat  ondansetron Injectable 4 milliGRAM(s) IV Push every 8 hours PRN Nausea and/or Vomiting                            12.0   8.04  )-----------( 259      ( 12 Mar 2025 06:53 )             36.2       03-12    142  |  110[H]  |  18  ----------------------------<  106[H]  3.7   |  23  |  0.86    Ca    9.5      12 Mar 2025 06:53  Phos  3.3     03-12  Mg     2.0     03-12

## 2025-03-12 NOTE — PROGRESS NOTE ADULT - ASSESSMENT
89y F, from home, ambulates with cane/walker, with PMH of HTN, HLD, Breast CA s/p R mastectomy, Glaucoma, Calculus of bile duct s/p ERCP, Vertigo, Migraine, Renal cyst, Fatty liver, and GERD p/w several days of sore throat, nasal congestion, R ear pain, dry cough, and epigastric pain.  1.Mod as last year-Echocardiogram.  2.HTN-cont ace,norvasc,d/c  b blocker-pt with sinus bradycardia.  3.Abdominal pain GI f/u.  4.Lipid d/o-statin.  5.GI and DVT prophylaxis.  
89y F, from home, ambulates with cane/walker, with PMH of HTN, HLD, Breast CA s/p R mastectomy, Glaucoma, Calculus of bile duct s/p ERCP, Vertigo, Migraine, Renal cyst, Fatty liver, and GERD p/w several days of sore throat, nasal congestion, R ear pain, dry cough,URI and epigastric pain.  1.Mild-mod as-Echocardiogram.  2.HTN-cont ace,norvasc.  3.Abdominal pain GI f/u.  4.Lipid d/o-statin.  5.GI and DVT prophylaxis.  
89y F, [place] speaking with PMH of HTN, HLD, Breast CA s/p R mastectomy, Glaucoma, Calculus of bile duct s/p ERCP, Vertigo, Migraine, Renal cyst, Fatty liver, and GERD p/w sore throat, nasal congestion, cough, and epigastric pain. RSV/Flu negative. CXR - no PNA. Pt was admitted to telemetry for r/o ACS and Hypertensive Urgency. Pt had elevated troponins 228>242>228. Cardiology Dr. Nesbitt following,   Hypertension is improving.     
1. Abdominal pain  2. R/o Peptic ulcer disease  3. R/o Esophagitis  4. R/o Viral gastritis   6. GERD  7. Diverticulosis without diverticulitis  8. Sigmoid wall thickening seen on CT-Scan dated 06/2024  9. R/o colonic neoplasm  10. Hepatic steatosis    Suggestions:    1. Protonix 40mg BID  2. Anti reflux measure  3. Better control of DM  4. Avoid NSAID  5. Diet as tolerated  6. Colonoscopy out patient  7. DVT prophylaxis
89y F, [place] speaking with PMH of HTN, HLD, Breast CA s/p R mastectomy, Glaucoma, Calculus of bile duct s/p ERCP, Vertigo, Migraine, Renal cyst, Fatty liver, and GERD p/w sore throat, nasal congestion, cough, and epigastric pain. RSV/Flu negative. CXR - no PNA. Pt was admitted to telemetry for r/o ACS and Hypertensive Urgency. Pt had elevated troponins 228>242>228. Cardiology Dr. Nesbitt following, pending echo.  Hypertension is improving.     
89y F, [place]kin[place] speaking with PMH of HTN, HLD, Breast CA s/p R mastectomy, Glaucoma, Calculus of bile duct s/p ERCP, Vertigo, Migraine, Renal cyst, Fatty liver, and GERD p/w sore throat, nasal congestion, cough, and epigastric pain. RSV/Flu negative. CXR - no PNA. Pt was admitted to telemetry for r/o ACS and Hypertensive Urgency. Pt had elevated troponins 228>242>228. Cardiology Dr. Nesbitt following,   Hypertension is improving.     
1. Abdominal pain  2. R/o Peptic ulcer disease  3. R/o Esophagitis  4. R/o Viral gastritis   6. GERD  7. Diverticulosis without diverticulitis  8. Sigmoid wall thickening seen on CT-Scan dated 06/2024  9. R/o colonic neoplasm  10. Hepatic steatosis    Suggestions:    1. Protonix 40mg BID  2. Anti reflux measure  3. Better control of DM  4. Avoid NSAID  5. Diet as tolerated  6. Colonoscopy out patient  7. DVT prophylaxis

## 2025-03-12 NOTE — PROGRESS NOTE ADULT - SUBJECTIVE AND OBJECTIVE BOX
Patient is a 89y old  Female who presents with a chief complaint of HTN urgency, Hypokalemia, URI, Type 2 MI (12 Mar 2025 10:03)  Awake, alert, lying in bed in NAD    INTERVAL HPI/OVERNIGHT EVENTS:      VITAL SIGNS:  T(F): 98.1 (03-12-25 @ 07:36)  HR: 61 (03-12-25 @ 07:36)  BP: 141/57 (03-12-25 @ 07:36)  RR: 18 (03-12-25 @ 07:36)  SpO2: 95% (03-12-25 @ 07:36)  Wt(kg): --  I&O's Detail    11 Mar 2025 07:01  -  12 Mar 2025 07:00  --------------------------------------------------------  IN:    Oral Fluid: 300 mL  Total IN: 300 mL    OUT:  Total OUT: 0 mL    Total NET: 300 mL              REVIEW OF SYSTEMS:    CONSTITUTIONAL:  No fevers, chills, sweats    HEENT:  Eyes:  No diplopia or blurred vision. ENT:  No earache, sore throat or runny nose.    CARDIOVASCULAR:  No pressure, squeezing, tightness, or heaviness about the chest; no palpitations.    RESPIRATORY:  Per HPI    GASTROINTESTINAL:  No abdominal pain, nausea, vomiting or diarrhea.    GENITOURINARY:  No dysuria, frequency or urgency.    NEUROLOGIC:  No paresthesias, fasciculations, seizures or weakness.    PSYCHIATRIC:  No disorder of thought or mood.      PHYSICAL EXAM:    Constitutional: Well developed and nourished  Eyes:Perrla  ENMT: normal  Neck:supple  Respiratory: good air entry  Cardiovascular: S1 S2 regular  Gastrointestinal: Soft, Non tender  Extremities: No edema  Vascular:normal  Neurological:Awake, alert,Ox3  Musculoskeletal:Normal      MEDICATIONS  (STANDING):  amLODIPine   Tablet 5 milliGRAM(s) Oral daily  amoxicillin  875 milliGRAM(s)/clavulanate 1 Tablet(s) Oral every 12 hours  atorvastatin 20 milliGRAM(s) Oral at bedtime  donepezil 10 milliGRAM(s) Oral at bedtime  enoxaparin Injectable 40 milliGRAM(s) SubCutaneous every 24 hours  fluticasone propionate 50 MICROgram(s)/spray Nasal Spray 1 Spray(s) Both Nostrils two times a day  latanoprost 0.005% Ophthalmic Solution 1 Drop(s) Both EYES at bedtime  losartan 100 milliGRAM(s) Oral daily  pantoprazole    Tablet 40 milliGRAM(s) Oral two times a day  QUEtiapine 50 milliGRAM(s) Oral at bedtime  triamcinolone 0.1% Cream 1 Application(s) Topical two times a day    MEDICATIONS  (PRN):  acetaminophen     Tablet .. 650 milliGRAM(s) Oral every 6 hours PRN Temp greater or equal to 38C (100.4F), Mild Pain (1 - 3)  benzocaine/menthol Lozenge 1 Lozenge Oral every 3 hours PRN Sore Throat  ondansetron Injectable 4 milliGRAM(s) IV Push every 8 hours PRN Nausea and/or Vomiting      Allergies    No Known Allergies    Intolerances        LABS:                        12.0   8.04  )-----------( 259      ( 12 Mar 2025 06:53 )             36.2     03-12    142  |  110[H]  |  18  ----------------------------<  106[H]  3.7   |  23  |  0.86    Ca    9.5      12 Mar 2025 06:53  Phos  3.3     03-12  Mg     2.0     03-12        Urinalysis Basic - ( 12 Mar 2025 06:53 )    Color: x / Appearance: x / SG: x / pH: x  Gluc: 106 mg/dL / Ketone: x  / Bili: x / Urobili: x   Blood: x / Protein: x / Nitrite: x   Leuk Esterase: x / RBC: x / WBC x   Sq Epi: x / Non Sq Epi: x / Bacteria: x            CAPILLARY BLOOD GLUCOSE            RADIOLOGY & ADDITIONAL TESTS:    CXR:  < from: Xray Chest 2 Views PA/Lat (03.09.25 @ 19:07) >  IMPRESSION:    No acute cardiopulmonary disease process.    --- End of Report ---    < end of copied text >    Ct scan chest:    ekg;    echo:

## 2025-03-12 NOTE — PROGRESS NOTE ADULT - NEGATIVE RESPIRATORY AND THORAX SYMPTOMS
no wheezing/no dyspnea/no cough/no pleuritic chest pain
no wheezing/no dyspnea/no cough/no pleuritic chest pain

## 2025-03-12 NOTE — PROGRESS NOTE ADULT - NEGATIVE PSYCHIATRIC SYMPTOMS
no suicidal ideation/no depression/no anxiety/no memory loss/no paranoia/no mood swings/no agitation
no suicidal ideation/no depression/no anxiety/no memory loss/no paranoia/no mood swings/no agitation

## 2025-03-12 NOTE — DISCHARGE NOTE PROVIDER - NSDCFUSCHEDAPPT_GEN_ALL_CORE_FT
Raphael Bourgeois  [facility]  NEUROLOGY [id number] [facility]  Scheduled Appointment: 06/02/2025

## 2025-03-12 NOTE — DISCHARGE NOTE NURSING/CASE MANAGEMENT/SOCIAL WORK - FINANCIAL ASSISTANCE
[facility] provides services at a reduced cost to those who are determined to be eligible through [facility]’s financial assistance program. Information regarding [facility]’s financial assistance program can be found by going to [email or web address] or by calling [phone].

## 2025-03-12 NOTE — PROGRESS NOTE ADULT - SUBJECTIVE AND OBJECTIVE BOX
Patient is a 89y old  Female who presents with a chief complaint of HTN urgency, Hypokalemia, URI, Type 2 MI (12 Mar 2025 07:57)    pt seen in tele [ x ], reg med floor [   ], bed [x  ], chair at bedside [   ], a+o x3 [ x ], lethargic [  ],    nad [ x ]      Allergies    No Known Allergies        Vitals    T(F): 98.1 (03-12-25 @ 07:36), Max: 98.1 (03-12-25 @ 07:36)  HR: 61 (03-12-25 @ 07:36) (58 - 64)  BP: 141/57 (03-12-25 @ 07:36) (103/42 - 147/63)  RR: 18 (03-12-25 @ 07:36) (18 - 19)  SpO2: 95% (03-12-25 @ 07:36) (95% - 100%)  Wt(kg): --  CAPILLARY BLOOD GLUCOSE          Labs                          12.0   8.04  )-----------( 259      ( 12 Mar 2025 06:53 )             36.2       03-12    142  |  110[H]  |  18  ----------------------------<  106[H]  3.7   |  23  |  0.86    Ca    9.5      12 Mar 2025 06:53  Phos  3.3     03-12  Mg     2.0     03-12            Troponin I, High Sensitivity Result: 228.9 ng/L (03-10-25 @ 05:00)  Troponin I, High Sensitivity Result: 242.9 ng/L (03-09-25 @ 22:43)  Troponin I, High Sensitivity Result: 228.3 ng/L (03-09-25 @ 18:31)        Radiology Results      Meds    MEDICATIONS  (STANDING):  amLODIPine   Tablet 5 milliGRAM(s) Oral daily  amoxicillin  875 milliGRAM(s)/clavulanate 1 Tablet(s) Oral every 12 hours  atorvastatin 20 milliGRAM(s) Oral at bedtime  donepezil 10 milliGRAM(s) Oral at bedtime  enoxaparin Injectable 40 milliGRAM(s) SubCutaneous every 24 hours  fluticasone propionate 50 MICROgram(s)/spray Nasal Spray 1 Spray(s) Both Nostrils two times a day  latanoprost 0.005% Ophthalmic Solution 1 Drop(s) Both EYES at bedtime  losartan 100 milliGRAM(s) Oral daily  pantoprazole    Tablet 40 milliGRAM(s) Oral two times a day  QUEtiapine 50 milliGRAM(s) Oral at bedtime  triamcinolone 0.1% Cream 1 Application(s) Topical two times a day      MEDICATIONS  (PRN):  acetaminophen     Tablet .. 650 milliGRAM(s) Oral every 6 hours PRN Temp greater or equal to 38C (100.4F), Mild Pain (1 - 3)  benzocaine/menthol Lozenge 1 Lozenge Oral every 3 hours PRN Sore Throat  ondansetron Injectable 4 milliGRAM(s) IV Push every 8 hours PRN Nausea and/or Vomiting      Physical Exam    Neuro :  no focal deficits  Respiratory: CTA B/L  CV: RRR, S1S2, no murmurs,   Abdominal: Soft, NT, ND +BS,  Extremities: No edema, + peripheral pulses      ASSESSMENT    uncontrolled htn with urgency,   troponinemia.    r/o acs,   hypokalemia,   uri,   sore throat,   h/o HTN,   HLD,   Breast CA s/p R mastectomy,   Glaucoma,   Vertigo,   Migraine,   Renal cyst,   Fatty liver,   GERD   Calculus of bile duct s/p ERCP,  S/P cholecystectomy  S/P knee replacement  H/O eye surgery        PLAN     cont tele,   acs protocol,   aspirin, statin,   trop x3 elevated noted above   cardio   cont ace,norvasc,  d/c hctz.   Add low dose b blocker.  f/u echo   bp improved   supplemented potassium   potassium wnl  cont augmentin 875 bid x 7 days,   cont cepacol yefri,   pulm f/u   Analgesics prn  Robitussin 10 cc po tid PRN  IVF  Bronchodilators prn.   gi f/u   Abdominal pain in this patient can be due to Peptic ulcer disease, Esophagitis, Viral gastritis, GERD  Sigmoid wall thickening seen on CT-Scan dated 06/2024  R/o colonic neoplasm  Hepatic steatosis  cont Protonix 40mg BID  Avoid NSAID  Colonoscopy out patient  cont current meds        Patient is a 89y old  Female who presents with a chief complaint of HTN urgency, Hypokalemia, URI, Type 2 MI (12 Mar 2025 07:57)    pt seen in tele [ x ], reg med floor [   ], bed [x  ], chair at bedside [   ], a+o x3 [ x ], lethargic [  ],    nad [ x ]      Allergies    No Known Allergies        Vitals    T(F): 98.1 (03-12-25 @ 07:36), Max: 98.1 (03-12-25 @ 07:36)  HR: 61 (03-12-25 @ 07:36) (58 - 64)  BP: 141/57 (03-12-25 @ 07:36) (103/42 - 147/63)  RR: 18 (03-12-25 @ 07:36) (18 - 19)  SpO2: 95% (03-12-25 @ 07:36) (95% - 100%)  Wt(kg): --  CAPILLARY BLOOD GLUCOSE          Labs                          12.0   8.04  )-----------( 259      ( 12 Mar 2025 06:53 )             36.2       03-12    142  |  110[H]  |  18  ----------------------------<  106[H]  3.7   |  23  |  0.86    Ca    9.5      12 Mar 2025 06:53  Phos  3.3     03-12  Mg     2.0     03-12            Troponin I, High Sensitivity Result: 228.9 ng/L (03-10-25 @ 05:00)  Troponin I, High Sensitivity Result: 242.9 ng/L (03-09-25 @ 22:43)  Troponin I, High Sensitivity Result: 228.3 ng/L (03-09-25 @ 18:31)    < from: TTE W or WO Ultrasound Enhancing Agent (03.10.25 @ 15:33) >  CONCLUSIONS:      1. Left ventricular cavity is normal in size. Left ventricular wall thickness is mildly increased. Left ventricular systolic function is hyperdynamic with an ejection fraction of70 % by Moreno's method of disks. There are no regional wall motion abnormalities seen.   2. Basal sigmoid septum measuring 1.3 cm.   3. There is mild (grade 1) left ventricular diastolic dysfunction.   4. Normal right ventricular cavity size and normal right ventricular systolic function.   5. Trileaflet aortic valve. There is calcification of the aortic valve leaflets. Mild to moderate aortic stenosis.   6. Mild to moderate aortic regurgitation.   7. There is mild posterior calcification of the mitral valve annulus.   8. Mild mitral valve leaflet calcification.   9. Mild to moderate mitral regurgitation.  10. No mitral valve stenosis. A hyperdynamic left ventricle contributes to the elevated transmitral gradient.  11. No echocardiographic evidence of pulmonary hypertension.  12. No pericardial effusion seen.  13. Compared to the transthoracic echocardiogram performed on 4/24/2024, there have been no significant interval changes.    < end of copied text >      Radiology Results      Meds    MEDICATIONS  (STANDING):  amLODIPine   Tablet 5 milliGRAM(s) Oral daily  amoxicillin  875 milliGRAM(s)/clavulanate 1 Tablet(s) Oral every 12 hours  atorvastatin 20 milliGRAM(s) Oral at bedtime  donepezil 10 milliGRAM(s) Oral at bedtime  enoxaparin Injectable 40 milliGRAM(s) SubCutaneous every 24 hours  fluticasone propionate 50 MICROgram(s)/spray Nasal Spray 1 Spray(s) Both Nostrils two times a day  latanoprost 0.005% Ophthalmic Solution 1 Drop(s) Both EYES at bedtime  losartan 100 milliGRAM(s) Oral daily  pantoprazole    Tablet 40 milliGRAM(s) Oral two times a day  QUEtiapine 50 milliGRAM(s) Oral at bedtime  triamcinolone 0.1% Cream 1 Application(s) Topical two times a day      MEDICATIONS  (PRN):  acetaminophen     Tablet .. 650 milliGRAM(s) Oral every 6 hours PRN Temp greater or equal to 38C (100.4F), Mild Pain (1 - 3)  benzocaine/menthol Lozenge 1 Lozenge Oral every 3 hours PRN Sore Throat  ondansetron Injectable 4 milliGRAM(s) IV Push every 8 hours PRN Nausea and/or Vomiting      Physical Exam    Neuro :  no focal deficits  Respiratory: CTA B/L  CV: RRR, S1S2, no murmurs,   Abdominal: Soft, NT, ND +BS,  Extremities: No edema, + peripheral pulses      ASSESSMENT    uncontrolled htn with urgency,   troponinemia likely 2nd to demand ischemia 2nd to uncontrolled htn .    hypokalemia,   uri,   sore throat,   h/o HTN,   HLD,   Breast CA s/p R mastectomy,   Glaucoma,   Vertigo,   Migraine,   Renal cyst,   Fatty liver,   GERD   Calculus of bile duct s/p ERCP,  S/P cholecystectomy  S/P knee replacement  H/O eye surgery        PLAN     d/c tele,   aspirin, statin,   trop x3 elevated noted above   cardio f/u    cont losartan and,norvasc,   d/c'd  b blocker-pt with sinus bradycardia.  echo with ejection fraction of 70 %, mild (grade 1) left ventricular diastolic dysfunction.  Mild to moderate aortic stenosis. Mild to moderate aortic regurgitation. Compared to the   transthoracic echocardiogram performed on 4/24/2024, there have been no significant interval   changes noted above.    bp improved   potassium wnl  cont augmentin 875 bid x to complete 7 days,   d/c cepacol yefri,   sore throat improved   pulm f/u   Analgesics prn  Robitussin 10 cc po tid PRN  IVF  Bronchodilators prn.   gi f/u   Abdominal pain in this patient can be due to Peptic ulcer disease, Esophagitis, Viral gastritis, GERD  Sigmoid wall thickening seen on CT-Scan dated 06/2024  R/o colonic neoplasm  Hepatic steatosis  cont Protonix 40mg BID  Avoid NSAID  Colonoscopy out patient  cont current meds   pt stable for d/c

## 2025-03-12 NOTE — DISCHARGE NOTE PROVIDER - PROVIDER TOKENS
PROVIDER:[TOKEN:[[id number]]] PROVIDER:[TOKEN:[[id number]]],PROVIDER:[TOKEN:[[id number]]],PROVIDER:[TOKEN:[[id number]]]

## 2025-03-12 NOTE — PROGRESS NOTE ADULT - NEGATIVE OPHTHALMOLOGIC SYMPTOMS
no lacrimation L/no lacrimation R/no blurred vision L/no blurred vision R/no discharge L/no discharge R/no scleral injection L/no scleral injection R
no lacrimation L/no lacrimation R/no blurred vision L/no blurred vision R/no discharge L/no discharge R/no scleral injection L/no scleral injection R

## 2025-03-12 NOTE — PROGRESS NOTE ADULT - SUBJECTIVE AND OBJECTIVE BOX
NP Note discussed with  Primary Attending    Patient is a 89y old  Female who presents with a chief complaint of HTN urgency, Hypokalemia, URI, Type 2 MI (11 Mar 2025 12:23)      INTERVAL HPI/OVERNIGHT EVENTS: no new complaints    MEDICATIONS  (STANDING):  amLODIPine   Tablet 5 milliGRAM(s) Oral daily  amoxicillin  875 milliGRAM(s)/clavulanate 1 Tablet(s) Oral every 12 hours  atorvastatin 20 milliGRAM(s) Oral at bedtime  donepezil 10 milliGRAM(s) Oral at bedtime  enoxaparin Injectable 40 milliGRAM(s) SubCutaneous every 24 hours  fluticasone propionate 50 MICROgram(s)/spray Nasal Spray 1 Spray(s) Both Nostrils two times a day  latanoprost 0.005% Ophthalmic Solution 1 Drop(s) Both EYES at bedtime  losartan 100 milliGRAM(s) Oral daily  pantoprazole    Tablet 40 milliGRAM(s) Oral two times a day  QUEtiapine 50 milliGRAM(s) Oral at bedtime  triamcinolone 0.1% Cream 1 Application(s) Topical two times a day    MEDICATIONS  (PRN):  acetaminophen     Tablet .. 650 milliGRAM(s) Oral every 6 hours PRN Temp greater or equal to 38C (100.4F), Mild Pain (1 - 3)  benzocaine/menthol Lozenge 1 Lozenge Oral every 3 hours PRN Sore Throat  ondansetron Injectable 4 milliGRAM(s) IV Push every 8 hours PRN Nausea and/or Vomiting      __________________________________________________  REVIEW OF SYSTEMS:    CONSTITUTIONAL: No fever,   EYES: no acute visual disturbances  NECK: No pain or stiffness  RESPIRATORY: No cough; No shortness of breath  CARDIOVASCULAR: No chest pain, no palpitations  GASTROINTESTINAL: No pain. No nausea or vomiting; No diarrhea   NEUROLOGICAL: No headache or numbness, no tremors  MUSCULOSKELETAL: No joint pain, no muscle pain  GENITOURINARY: no dysuria, no frequency, no hesitancy  PSYCHIATRY: no depression , no anxiety  ALL OTHER  ROS negative        Vital Signs Last 24 Hrs  T(C): 36.7 (12 Mar 2025 07:36), Max: 36.7 (12 Mar 2025 07:36)  T(F): 98.1 (12 Mar 2025 07:36), Max: 98.1 (12 Mar 2025 07:36)  HR: 61 (12 Mar 2025 07:36) (58 - 64)  BP: 141/57 (12 Mar 2025 07:36) (103/42 - 147/63)  BP(mean): --  RR: 18 (12 Mar 2025 07:36) (18 - 19)  SpO2: 95% (12 Mar 2025 07:36) (95% - 100%)    Parameters below as of 12 Mar 2025 07:36  Patient On (Oxygen Delivery Method): room air        ________________________________________________  PHYSICAL EXAM:  GENERAL: NAD  HEENT: Normocephalic;  conjunctivae and sclerae clear; moist mucous membranes;   NECK : supple  CHEST/LUNG: Clear to auscultation bilaterally with good air entry   HEART: S1 S2  regular; no murmurs, gallops or rubs  ABDOMEN: Soft, Nontender, Nondistended; Bowel sounds present  EXTREMITIES: no cyanosis; no edema; no calf tenderness  SKIN: warm and dry; no rash  NERVOUS SYSTEM:  Awake and alert; Oriented  to place, person and time ; no new deficits    _________________________________________________  LABS:                        12.0   8.04  )-----------( 259      ( 12 Mar 2025 06:53 )             36.2     03-12    142  |  110[H]  |  18  ----------------------------<  106[H]  3.7   |  23  |  0.86    Ca    9.5      12 Mar 2025 06:53  Phos  3.3     03-12  Mg     2.0     03-12        Urinalysis Basic - ( 12 Mar 2025 06:53 )    Color: x / Appearance: x / SG: x / pH: x  Gluc: 106 mg/dL / Ketone: x  / Bili: x / Urobili: x   Blood: x / Protein: x / Nitrite: x   Leuk Esterase: x / RBC: x / WBC x   Sq Epi: x / Non Sq Epi: x / Bacteria: x      CAPILLARY BLOOD GLUCOSE            RADIOLOGY & ADDITIONAL TESTS:    Imaging  Reviewed:  YES/NO    Consultant(s) Notes Reviewed:   YES/ No      Plan of care was discussed with patient and /or primary care giver; all questions and concerns were addressed

## 2025-03-12 NOTE — DISCHARGE NOTE NURSING/CASE MANAGEMENT/SOCIAL WORK - NSDCPEFALRISK_GEN_ALL_CORE
For information on Fall & Injury Prevention, visit: [email or web address] OR  [email or web address] OR  https://www.cdc.gov/steadi/patient.html

## 2025-03-12 NOTE — PROGRESS NOTE ADULT - PROBLEM SELECTOR PLAN 2
Possibly due to uncontrolled HTN  -Troponins 228-->242-->228  -S/p ASA 81mg  -C/w Atorvastatin 20mg   - F/u Echo read Ear Wedge Repair Text: A wedge excision was completed by carrying down an excision through the full thickness of the ear and cartilage with an inward facing Burow's triangle. The wound was then closed in a layered fashion.

## 2025-03-12 NOTE — PROGRESS NOTE ADULT - PROBLEM SELECTOR PROBLEM 1
Hypertensive emergency
Symptoms of upper respiratory infection (URI)
Symptoms of upper respiratory infection (URI)
Hypertensive emergency
Hypertensive emergency

## 2025-03-12 NOTE — PROGRESS NOTE ADULT - SUBJECTIVE AND OBJECTIVE BOX
Date of Service 03-12-25 @ 10:04    CHIEF COMPLAINT:Patient is a 89y old  Female who presents with a chief complaint of HTN urgency, Hypokalemia, URI, Type 2 MI .Pt appears comfortable.    	  REVIEW OF SYSTEMS:  CONSTITUTIONAL: No fever, weight loss, or fatigue  EYES: No eye pain, visual disturbances, or discharge  ENT:  No difficulty hearing, tinnitus, vertigo; No sinus or throat pain  NECK: No pain or stiffness  RESPIRATORY: No cough, wheezing, chills or hemoptysis; No Shortness of Breath  CARDIOVASCULAR: No chest pain, palpitations, passing out, dizziness, or leg swelling  GASTROINTESTINAL: No abdominal or epigastric pain. No nausea, vomiting, or hematemesis; No diarrhea or constipation. No melena or hematochezia.  GENITOURINARY: No dysuria, frequency, hematuria, or incontinence  NEUROLOGICAL: No headaches, memory loss, loss of strength, numbness, or tremors  SKIN: No itching, burning, rashes, or lesions   LYMPH Nodes: No enlarged glands  ENDOCRINE: No heat or cold intolerance; No hair loss  MUSCULOSKELETAL: No joint pain or swelling; No muscle, back, or extremity pain  PSYCHIATRIC: No depression, anxiety, mood swings, or difficulty sleeping  HEME/LYMPH: No easy bruising, or bleeding gums  ALLERGY AND IMMUNOLOGIC: No hives or eczema	      PHYSICAL EXAM:  T(C): 36.7 (03-12-25 @ 07:36), Max: 36.7 (03-12-25 @ 07:36)  HR: 61 (03-12-25 @ 07:36) (58 - 64)  BP: 141/57 (03-12-25 @ 07:36) (103/42 - 147/63)  RR: 18 (03-12-25 @ 07:36) (18 - 19)  SpO2: 95% (03-12-25 @ 07:36) (95% - 100%)  Wt(kg): --  I&O's Summary    11 Mar 2025 07:01  -  12 Mar 2025 07:00  --------------------------------------------------------  IN: 300 mL / OUT: 0 mL / NET: 300 mL        Appearance: Normal	  HEENT:   Normal oral mucosa, PERRL, EOMI	  Lymphatic: No lymphadenopathy  Cardiovascular: Normal S1 S2, No JVD, No murmurs, No edema  Respiratory: Lungs clear to auscultation	  Psychiatry: A & O x 3, Mood & affect appropriate  Gastrointestinal:  Soft, Non-tender, + BS	  Skin: No rashes, No ecchymoses, No cyanosis	  Neurologic: Non-focal  Extremities: Normal range of motion, No clubbing, cyanosis or edema  Vascular: Peripheral pulses palpable 2+ bilaterally    MEDICATIONS  (STANDING):  amLODIPine   Tablet 5 milliGRAM(s) Oral daily  amoxicillin  875 milliGRAM(s)/clavulanate 1 Tablet(s) Oral every 12 hours  atorvastatin 20 milliGRAM(s) Oral at bedtime  donepezil 10 milliGRAM(s) Oral at bedtime  enoxaparin Injectable 40 milliGRAM(s) SubCutaneous every 24 hours  fluticasone propionate 50 MICROgram(s)/spray Nasal Spray 1 Spray(s) Both Nostrils two times a day  latanoprost 0.005% Ophthalmic Solution 1 Drop(s) Both EYES at bedtime  losartan 100 milliGRAM(s) Oral daily  pantoprazole    Tablet 40 milliGRAM(s) Oral two times a day  QUEtiapine 50 milliGRAM(s) Oral at bedtime  triamcinolone 0.1% Cream 1 Application(s) Topical two times a day      TELEMETRY: 	nsr 60's,pvc;s    	  	  LABS:	 	  Troponin I, High Sensitivity Result: 228.9 ng/L (03-10 @ 05:00)  Troponin I, High Sensitivity Result: 242.9 ng/L (03-09 @ 22:43)  Troponin I, High Sensitivity Result: 228.3 ng/L (03-09 @ 18:31)                            12.0   8.04  )-----------( 259      ( 12 Mar 2025 06:53 )             36.2     03-12    142  |  110[H]  |  18  ----------------------------<  106[H]  3.7   |  23  |  0.86    Ca    9.5      12 Mar 2025 06:53  Phos  3.3     03-12  Mg     2.0     03-12      proBNP:   Lipid Profile: Cholesterol 213  LDL --    TG 59  Ldl calc 84  Ratio --    < from: TTE W or WO Ultrasound Enhancing Agent (03.10.25 @ 15:33) >  CONCLUSIONS:      1. Left ventricular cavity is normal in size. Left ventricular wall thickness is mildly increased. Left ventricular systolic function is hyperdynamic with an ejection fraction of70 % by Moreno's method of disks. There are no regional wall motion abnormalities seen.   2. Basal sigmoid septum measuring 1.3 cm.   3. There is mild (grade 1) left ventricular diastolic dysfunction.   4. Normal right ventricular cavity size and normal right ventricular systolic function.   5. Trileaflet aortic valve. There is calcification of the aortic valve leaflets. Mild to moderate aortic stenosis.   6. Mild to moderate aortic regurgitation.   7. There is mild posterior calcification of the mitral valve annulus.   8. Mild mitral valve leaflet calcification.   9. Mild to moderate mitral regurgitation.  10. No mitral valve stenosis. A hyperdynamic left ventricle contributes to the elevated transmitral gradient.  11. No echocardiographic evidence of pulmonary hypertension.  12. No pericardial effusion seen.  13. Compared to the transthoracic echocardiogram performed on 4/24/2024, there have been no significant interval changes.      < end of copied text >

## 2025-03-12 NOTE — DISCHARGE NOTE NURSING/CASE MANAGEMENT/SOCIAL WORK - PATIENT PORTAL LINK FT
You can access the FollowMyHealth Patient Portal offered by [facility] by registering at the following website: [email or web address]. By joining [facility]’s FollowMyHealth portal, you will also be able to view your health information using other applications (apps) compatible with our system.

## 2025-03-12 NOTE — PROGRESS NOTE ADULT - PROVIDER SPECIALTY LIST ADULT
Internal Medicine
Cardiology
Cardiology
Gastroenterology
Internal Medicine
Pulmonology
Pulmonology
Internal Medicine
Gastroenterology

## 2025-03-12 NOTE — PROGRESS NOTE ADULT - REASON FOR ADMISSION
HTN urgency, Hypokalemia, URI, Type 2 MI

## 2025-03-12 NOTE — PROGRESS NOTE ADULT - PROBLEM SELECTOR PLAN 1
Analgesics prn  Robitussin 10 cc po TID prn  Bronchodilators prn
Analgesics prn  Robitussin 10 cc po TID prn  Bronchodilators prn
pt with -/79 - 215/85 on admission  now with elevated troponins   on home meds HCTZ 25mg, Losartan 100mg, and amlodipine 5mg   c/w losartan 100 mg qd + amlodipine 5 mg qd   Cardiology Dr. Nesbitt following, dc hctz, add metoprolol 12.5 mg bid  BP improving
/79 - 215/85 on admission  -Elevated Troponins 228-->242-->228   -home meds HCTZ 25mg, Losartan 100mg, and amlodipine 5mg   -HCTZ d/c   -C/w Losartan 100mg, Amlodipine 5mg daily   -D/c Metoprolol due to sinus nicole  -Cards Dr. Nesbitt following  BP improving
/79 - 215/85 on admission  -Elevated Troponins 228-->242-->228   -home meds HCTZ 25mg, Losartan 100mg, and amlodipine 5mg   -HCTZ d/c   -C/w Losartan 100mg, Amlodipine 5mg daily   -D/c Metoprolol due to sinus nicole  -Cards Dr. Nesbitt following  BP improving

## 2025-03-12 NOTE — DISCHARGE NOTE PROVIDER - CARE PROVIDERS DIRECT ADDRESSES
[email or web address]

## 2025-03-12 NOTE — DISCHARGE NOTE PROVIDER - NSDCCPCAREPLAN_GEN_ALL_CORE_FT
PRINCIPAL DISCHARGE DIAGNOSIS  Diagnosis: Elevated troponin  Assessment and Plan of Treatment: Your heart enzymes were elevated indicative of a strain on your heart during your viral illness.  Report any symptoms of chest pain or shortness of breath, palpitations.  Follow up with your doctor as outpatient.      SECONDARY DISCHARGE DIAGNOSES  Diagnosis: Upper respiratory virus  Assessment and Plan of Treatment: You were diagnosed with an upper respiratory virus.  Please continue to take your medication as prescribed and report any worsening symptoms such as cough, fever or chills, chest pain or shortness of breath.    Diagnosis: Hypertensive emergency  Assessment and Plan of Treatment: You are being treated for high blood pressure.  Continue taking your medication as prescribed to help prevent or minimize your risk of end organ damage.  Follow up with your doctor for routine blood pressure evaluation and medication regimen.  Report any symptoms of headaces with nausea or vomiting, visual changes, heart palpitation, chest pain or shortness.      Diagnosis: Hypokalemia  Assessment and Plan of Treatment: Your potassium level was low on admission likely due to your water pills your were taking.  Make sure to adhere to a well balance diet and continue to take your medication as instructed on your discharge paper.    Diagnosis: HLD (hyperlipidemia)  Assessment and Plan of Treatment: Please continue taking your cholesterol medication as prescribed and follow up with your doctor for routine blood work and including evaluation of your liver function while taking medication for your cholesterol.  Report any changes in the color of your skin such as yellowing of your skin, changes in the color of your urine, itching skin, cramps to your lower legs.      Diagnosis: Glaucoma  Assessment and Plan of Treatment: Continue to use your eye drop as prescribed and report any symptoms worsening visual changes or eye pain, eye redness.    Diagnosis: GERD (gastroesophageal reflux disease)  Assessment and Plan of Treatment: Change the factors that you can control. Ask your caregiver for guidance concerning weight loss, quitting smoking, and alcohol consumption. Avoid foods and drinks that make your symptoms worse, such as: Caffeine or alcoholic drinks. Chocolate. spicy foods. Citrus fruits tomato-based foods, Fried and fatty foods.  Avoid lying down for the 3 hours after eating .Eat small, frequent meals  Do not wear anything tight around your waist that causes pressure on your stomach. Raise the head of your bed 6 to 8 inches with wood blocks to help you sleep. Do not take aspirin, ibuprofen, or other nonsteroidal anti-inflammatory drugs (NSAIDs).You have pain in your arms, neck, jaw, teeth, or back. Your pain increases or changes in intensity or duration. You develop nausea, vomiting, or sweating (diaphoresis).You develop shortness of breath, or you faint.Your vomit is green, yellow, black, or looks like coffee grounds or blood.Your stool is red, bloody, or black.These symptoms could be signs of other problems, such as heart disease, gastric bleeding, or esophageal bleeding.

## 2025-03-12 NOTE — PROGRESS NOTE ADULT - NEGATIVE ENMT SYMPTOMS
no hearing difficulty/no ear pain/no tinnitus/no sinus symptoms/no nose bleeds/no gum bleeding/no dry mouth/no dysphagia
no hearing difficulty/no ear pain/no tinnitus/no sinus symptoms/no nose bleeds/no gum bleeding/no dry mouth/no dysphagia

## 2025-03-12 NOTE — PROGRESS NOTE ADULT - NEGATIVE SKIN SYMPTOMS
no itching/no dryness/no brittle nails/no pitted nails/no hair loss
no itching/no dryness/no brittle nails/no pitted nails/no hair loss

## 2025-03-12 NOTE — DISCHARGE NOTE PROVIDER - NSDCMRMEDTOKEN_GEN_ALL_CORE_FT
ALENDRONATE SODIUM 35 MG TAB: 1 tab(s) orally once a week PLEASE SEE ATTACHED FOR DETAILED DIRECTIONS  AMLODIPINE BESYLATE 5 MG TAB: 1 tab(s) orally once a day TAKE 1 TABLET BY MOUTH EVERY DAY FOR BLOOD PRESSURE  amoxicillin-clavulanate 875 mg-125 mg oral tablet: 1 tab(s) orally every 12 hours  ATORVASTATIN 20 MG TABLET: 1 tab(s) orally once a day (at bedtime) TAKE 1 TABLET BY MOUTH EVERY DAY AS DIRECTED  DONEPEZIL HCL 10 MG TABLET: 1 tab(s) orally once a day TAKE 1 TABLET BY MOUTH EVERY DAY AS DIRECTED  fluticasone 50 mcg/inh nasal spray: 1 spray(s) nasal 2 times a day  LATANOPROST 0.005% EYE DROPS: 1 drop(s) in each eye once a day (at bedtime) INSTILL 1 DROP INTO BOTH EYES AT BEDTIME  losartan 100 mg oral tablet: 1 tab(s) orally once a day  menthol-benzocaine: 1 application orally every 6 hours as needed for sorethroat  pantoprazole 40 mg oral delayed release tablet: 1 tab(s) orally 2 times a day  QUETIAPINE FUMARATE 25 MG TAB: 2 tab(s) orally once a day (at bedtime) TAKE 1 OR 2 TABLETS AT BED TIME DAILY   ALENDRONATE SODIUM 35 MG TAB: 1 tab(s) orally once a week PLEASE SEE ATTACHED FOR DETAILED DIRECTIONS  AMLODIPINE BESYLATE 5 MG TAB: 1 tab(s) orally once a day TAKE 1 TABLET BY MOUTH EVERY DAY FOR BLOOD PRESSURE  amoxicillin-clavulanate 875 mg-125 mg oral tablet: 1 tab(s) orally every 12 hours  ATORVASTATIN 20 MG TABLET: 1 tab(s) orally once a day (at bedtime) TAKE 1 TABLET BY MOUTH EVERY DAY AS DIRECTED  DONEPEZIL HCL 10 MG TABLET: 1 tab(s) orally once a day TAKE 1 TABLET BY MOUTH EVERY DAY AS DIRECTED  fluticasone 50 mcg/inh nasal spray: 1 spray(s) nasal 2 times a day  LATANOPROST 0.005% EYE DROPS: 1 drop(s) in each eye once a day (at bedtime) INSTILL 1 DROP INTO BOTH EYES AT BEDTIME  losartan 100 mg oral tablet: 1 tab(s) orally once a day  menthol-benzocaine: 1 application orally every 6 hours as needed for sorethroat  Out Patient Physical Therapy, Please Evaluate and Treat: MDD: 1  pantoprazole 40 mg oral delayed release tablet: 1 tab(s) orally 2 times a day  QUETIAPINE FUMARATE 25 MG TAB: 2 tab(s) orally once a day (at bedtime) TAKE 1 OR 2 TABLETS AT BED TIME DAILY

## 2025-03-12 NOTE — DISCHARGE NOTE PROVIDER - HOSPITAL COURSE
Pt is a 89y F, from home, ambulates with cane/walker, with PMH of HTN, HLD, Breast CA s/p R mastectomy, Glaucoma, Calculus of bile duct s/p ERCP, Vertigo, Migraine, Renal cyst, Fatty liver, and GERD p/w several days of sore throat, nasal congestion, R ear pain, dry cough, and epigastric pain. Reports decreased appetite and decreased PO intake over the last 3 days. Patient states she did not take her home BP medication prior to coming to ED. Reports some nausea this AM but denies vomiting. Granddaughter at bedside reports that patient has very sensitive stomach and has gone over list of foods to avoid with her PCP. Patient also reports frequent burping. Complains of sneezing and subsequent flatus whenever she sneezes recently. Denies fecal incontinence. Reports normal and regular BMs with last BM yesterday afternoon. Complains of painful itchy rash on scalp that frequently flares when sick or under stress. Follows with dermatologist who has prescribed multiple creams and shampoos with limited efficacy. Patient reports fall 3 days prior to admission and injury to R knee which is the knee she had replaced. States that since fall walking has been more difficult. Denies fever, chills, headache, dizziness, chest pain, palpitations, shortness of breath, vomiting, diarrhea, constipation, and dysuria.    Pt was noted to have an elevated BP, reported missing her home antihypertensive meds and was admitted for hypertensive urgency.    Her electrolyte for low potassium was corrected and pt was also follow by cardiology and her HTN meds were adjusted.     Pt is medically cleared for d/c as per discussion with primary Attending directing pt's care.      Pt is a 89y F, from home, ambulates with cane/walker, with PMH of HTN, HLD, Breast CA s/p R mastectomy, Glaucoma, Calculus of bile duct s/p ERCP, Vertigo, Migraine, Renal cyst, Fatty liver, and GERD p/w several days of sore throat, nasal congestion, R ear pain, dry cough, and epigastric pain. Reports decreased appetite and decreased PO intake over the last 3 days. Patient states she did not take her home BP medication prior to coming to ED. Reports some nausea this AM but denies vomiting. Granddaughter at bedside reports that patient has very sensitive stomach and has gone over list of foods to avoid with her PCP. Patient also reports frequent burping. Complains of sneezing and subsequent flatus whenever she sneezes recently. Denies fecal incontinence. Reports normal and regular BMs with last BM yesterday afternoon. Complains of painful itchy rash on scalp that frequently flares when sick or under stress. Follows with dermatologist who has prescribed multiple creams and shampoos with limited efficacy. Patient reports fall 3 days prior to admission and injury to R knee which is the knee she had replaced. States that since fall walking has been more difficult. Denies fever, chills, headache, dizziness, chest pain, palpitations, shortness of breath, vomiting, diarrhea, constipation, and dysuria.    Pt was noted to have an elevated BP, reported missing her home antihypertensive meds and was admitted for hypertensive urgency.    Her electrolyte for low potassium was corrected and pt was also follow by cardiology and her HTN meds were adjusted.     Pt is medically cleared for d/c as per discussion with primary Attending directing pt's care.    D/C planning discussed with pt's granddaughter at bedside with understanding verbalized.

## 2025-03-12 NOTE — PROGRESS NOTE ADULT - NEGATIVE NEUROLOGICAL SYMPTOMS
What Is The Reason For Today's Visit?: Upper Body Skin Exam
no generalized seizures/no focal seizures/no syncope/no tremors/no loss of sensation/no headache
no generalized seizures/no focal seizures/no syncope/no tremors/no loss of sensation/no headache

## 2025-03-12 NOTE — DISCHARGE NOTE PROVIDER - CARE PROVIDER_API CALL
Luis Manuel Rubin L  Pulmonary Disease  [address] [place] [address]  Phone: [phone]  Fax: [phone]  Follow Up Time:    Luis Manuel Rubin L  Pulmonary Disease  [address] [place] [address]  Phone: [phone]  Fax: [phone]  Follow Up Time:     Lisa Nesbitt  Cardiology  [address] [place] [address]  Phone: [phone]  Fax: [phone]  Follow Up Time:     Michael North  Gastroenterology  [address] [facility]  [place] [address]  Phone: [phone]  Fax: [phone]  Follow Up Time:

## 2025-04-15 ENCOUNTER — APPOINTMENT (OUTPATIENT)
Dept: GASTROENTEROLOGY | Facility: CLINIC | Age: 89
End: 2025-04-15
Payer: MEDICARE

## 2025-04-15 VITALS
HEIGHT: 61 IN | WEIGHT: 132 LBS | HEART RATE: 69 BPM | SYSTOLIC BLOOD PRESSURE: 152 MMHG | DIASTOLIC BLOOD PRESSURE: 89 MMHG | OXYGEN SATURATION: 97 % | BODY MASS INDEX: 24.92 KG/M2

## 2025-04-15 DIAGNOSIS — K80.50 CALCULUS OF BILE DUCT W/OUT CHOLANGITIS OR CHOLECYSTITIS W/OUT OBSTRUCTION: ICD-10-CM

## 2025-04-15 DIAGNOSIS — R10.11 RIGHT UPPER QUADRANT PAIN: ICD-10-CM

## 2025-04-15 PROCEDURE — 99204 OFFICE O/P NEW MOD 45 MIN: CPT

## 2025-04-28 ENCOUNTER — OUTPATIENT (OUTPATIENT)
Dept: OUTPATIENT SERVICES | Facility: HOSPITAL | Age: 89
LOS: 1 days | End: 2025-04-28

## 2025-04-28 VITALS
DIASTOLIC BLOOD PRESSURE: 83 MMHG | TEMPERATURE: 98 F | HEIGHT: 60.5 IN | RESPIRATION RATE: 16 BRPM | OXYGEN SATURATION: 99 % | HEART RATE: 73 BPM | SYSTOLIC BLOOD PRESSURE: 168 MMHG | WEIGHT: 136.03 LBS

## 2025-04-28 DIAGNOSIS — Z90.49 ACQUIRED ABSENCE OF OTHER SPECIFIED PARTS OF DIGESTIVE TRACT: Chronic | ICD-10-CM

## 2025-04-28 DIAGNOSIS — K80.50 CALCULUS OF BILE DUCT WITHOUT CHOLANGITIS OR CHOLECYSTITIS WITHOUT OBSTRUCTION: ICD-10-CM

## 2025-04-28 DIAGNOSIS — Z98.890 OTHER SPECIFIED POSTPROCEDURAL STATES: Chronic | ICD-10-CM

## 2025-04-28 DIAGNOSIS — K80.20 CALCULUS OF GALLBLADDER WITHOUT CHOLECYSTITIS WITHOUT OBSTRUCTION: ICD-10-CM

## 2025-04-28 DIAGNOSIS — Z96.659 PRESENCE OF UNSPECIFIED ARTIFICIAL KNEE JOINT: Chronic | ICD-10-CM

## 2025-04-28 RX ORDER — HYDROCHLOROTHIAZIDE 50 MG/1
1 TABLET ORAL
Refills: 0 | DISCHARGE

## 2025-04-28 RX ORDER — QUETIAPINE FUMARATE 25 MG/1
1 TABLET ORAL
Refills: 0 | DISCHARGE

## 2025-04-28 RX ORDER — ATORVASTATIN CALCIUM 80 MG/1
1 TABLET, FILM COATED ORAL
Refills: 0 | DISCHARGE

## 2025-04-28 RX ORDER — LATANOPROST PF 0.05 MG/ML
1 SOLUTION/ DROPS OPHTHALMIC
Refills: 0 | DISCHARGE

## 2025-04-28 RX ORDER — ALENDRONATE SODIUM 70 MG/1
1 TABLET ORAL
Refills: 0 | DISCHARGE

## 2025-04-28 RX ORDER — DONEPEZIL HYDROCHLORIDE 5 MG/1
1 TABLET ORAL
Refills: 0 | DISCHARGE

## 2025-04-28 RX ORDER — LOSARTAN POTASSIUM 100 MG/1
1 TABLET, FILM COATED ORAL
Refills: 0 | DISCHARGE

## 2025-04-28 RX ORDER — FLUTICASONE PROPIONATE 50 UG/1
2 SPRAY, METERED NASAL
Refills: 0 | DISCHARGE

## 2025-04-28 RX ORDER — AMLODIPINE BESYLATE 10 MG/1
1 TABLET ORAL
Refills: 0 | DISCHARGE

## 2025-04-28 NOTE — H&P PST ADULT - NEGATIVE NEUROLOGICAL SYMPTOMS
no transient paralysis/no weakness/no paresthesias/no generalized seizures/no vertigo/no loss of sensation/no difficulty walking/no headache/no facial palsy

## 2025-04-28 NOTE — H&P PST ADULT - CARDIOVASCULAR COMMENTS
Hypertension, dyslipidemia Hypertension, dyslipidemia, Pt was admitted to 3/10/25 [facility] hypertensive Emergency  noted to have elevated tropin Pt had Echo and EKG, work up negative for ischaemia pt was dx with elevated troponin due to URI

## 2025-04-28 NOTE — H&P PST ADULT - NEUROLOGICAL COMMENTS
Previously Declined (within the last year) dx with memory impairment/loss, answer question appropriately and accurately dx with memory impairment/loss, answer question appropriately and accurately 90% of the time

## 2025-04-28 NOTE — H&P PST ADULT - PSYCHIATRIC COMMENTS
Pt with hx memory loss Pt seen by neurologist Dr. COLÓN,  EXAM: MRI brain without contrast Pt with hx memory loss Pt was seen by neurologist 5/24/24  Dr. Bourgeois,  EXAM: MRI brain without contrast IMPRESSION:  No acute intracranial hemorrhage, acute infarction, extra-axial fluid collection or hydrocephalus

## 2025-04-28 NOTE — H&P PST ADULT - DOES PATIENT HAVE ADVANCE DIRECTIVE
Size Of Lesion In Cm (Optional): 0
Instructions (Optional): Will treat AKs on face in December when she has time off
Introduction Text (Please End With A Colon): The following procedure was deferred:
Detail Level: Detailed
Procedure To Be Performed At Next Visit: Cryotherapy
hcp given and explained

## 2025-04-28 NOTE — H&P PST ADULT - NSICDXPASTMEDICALHX_GEN_ALL_CORE_FT
PAST MEDICAL HISTORY:  Calculus of bile duct     Dementia     Fatty liver     GERD (gastroesophageal reflux disease)     Glaucoma     H/O cerebellar ataxia     HLD (hyperlipidemia)     HTN (hypertension)     Migraine     Renal cyst     Vertigo h/o

## 2025-04-28 NOTE — H&P PST ADULT - ASSESSMENT
. Left ventricular cavity is normal in size. Left ventricular wall thickness is mildly increased. Left   ventricular systolic function is hyperdynamic with an by Moreno's method of   disks.  2. Basal sigmoid septum measuring 1.3 cm.  3. There is mild (grade 1) left ventricular diastolic dysfunction.  4. Normal right ventricular cavity size and normal right ventricular systolic function.  5.   6.  7.  8.  9  10. No mitral valve stenosis. A hyperdynami

## 2025-04-28 NOTE — H&P PST ADULT - GASTROINTESTINAL COMMENTS
upper right quadrant intermittent, S/p cholecystectomy 2020, nausea and vomiting 15 day ago denies ant symptoms a present

## 2025-04-28 NOTE — H&P PST ADULT - PROBLEM SELECTOR PLAN 1
Scheduled for ERCP  Preop instructions provided and patient verbalizes understanding.  instructed regarding  NPO status to be given by surgeon office  Pt instructed to take HCTZ, Donepezil and atorvastatin am office

## 2025-04-28 NOTE — H&P PST ADULT - HISTORY OF PRESENT ILLNESS
89F with pmhx of  Pt with s/p cholecystectomy c/o upper right quadrant pain, Nause and Vomitting  Pt was evaluated and was referred to surgeon for  evaluation for choledocholithiasis. and ERCP. Had MRCP showing 2 stones in the bile duct in 2024, lost to f/u but [facility]v 89 female with pmhx of  Pt with s/p cholecystectomy c/o upper right quadrant pain, Nausea and Vomitting. Pt was noted to have an elevated BP, reported missing her home antihypertensive meds and was admitted for hypertensive urgency.  Lab showed elevated in troponin cardiac work up negative and hypokalemia which pt was    Pt went to ER  Pt was evaluated and was referred to surgeon for evaluation for choledocholithiasis. and ERCP. Had MRCP showing 2 stones in the bile duct in 2024, lost to f/u but has since had recent admissions to [facility] for epigastric pain, n/v Pt Now present in Presurgical testing for preop evaluation for scheduled procedure ERCP    89 female with pmhx of  Pt with s/p cholecystectomy Pt nina to [facility] ED  c/o upper right quadrant pain, Nausea and Vomitting. Pt was noted to have an elevated BP, reported missing her home antihypertensive meds and was admitted   for hypertensive urgency.  Lab showed elevated in troponin cardiac, cardiac  work up negative , Pt was seen by cardiologist and medication adjusted. Lab worked showed hypokalemia  low potassium was corrected. Since Pt had GERD symptoms Pt to f/u with GI.    Had MRCP showing 2 stones in the bile duct in 2024, Because of recent admission GI, Dr. Fonseca referred Pt to surgeon for ERCP.    Pt was evaluated by surgeon, ERCP. Recommended      Pt went to ER  Pt was evaluated and was referred to surgeon for evaluation for choledocholithiasis. and ERCP. Had MRCP showing 2 stones in the bile duct in 2024, lost to f/u but has since had recent admissions to [facility] for epigastric pain, n/v Pt present in Presurgical testing for preop evaluation for scheduled procedure ERCP    89 female with pmhx of  Pt with s/p cholecystectomy Pt went to [facility] ED  c/o upper right quadrant pain, Nausea and Vomitting. Pt was noted to have an elevated BP, reported missing her home antihypertensive meds and was admitted   for hypertensive urgency.  Lab showed elevated in troponin cardiac, cardiac  work up negative , Pt was seen by cardiologist and medication was adjusted. Lab worked showed hypokalemia  low potassium was corrected. Since Pt had GERD symptoms Pt to f/u with GI.    Had MRCP showing 2 stones in the bile duct in 2024, Because of recent admission GI, Dr. Fonseca referred Pt to surgeon for ERCP.    Pt was evaluated by surgeon, ERCP, Recommended

## 2025-04-28 NOTE — H&P PST ADULT - MUSCULOSKELETAL COMMENTS
right knee replacements, when standing for long periods experience leg pain pt prescribed physical therapy

## 2025-04-28 NOTE — H&P PST ADULT - LAST ECHOCARDIOGRAM
3/10/25 ejection fraction of 70 %,  There are no regional wall motion abnormalities seen. Trileaflet aortic valve. There is calcification of the aortic valve leaflets. Mild to moderate aortic stenosis., Mild to moderate aortic regurgitation.  There is mild posterior calcification of the mitral valve annulus.. Mild to moderate mitral regurgitation.   Mild mitral valve leaflet calcification.  (HIE )

## 2025-04-28 NOTE — H&P PST ADULT - NEGATIVE ENMT SYMPTOMS
no hearing difficulty/no ear pain/no tinnitus/no vertigo/no nasal obstruction/no post-nasal discharge

## 2025-04-28 NOTE — H&P PST ADULT - SKIN/BREAST COMMENTS
left breast cancer, s/p lumpectomy no chemo or radiation,   right breast cancerous right breast lumpectomy 1-2 years ago f/u with oncologist every 6 months

## 2025-04-28 NOTE — H&P PST ADULT - EKG AND INTERPRETATION
1. Left ventricular cavity is normal in size. Left ventricular wall thickness is mildly increased. Left ventricular systolic function is hyperdynamic with an ejection fraction of 70 % by Moreno's method of disks. There are no regional wall motion abnormalities seen.   2. Basal sigmoid septum measuring 1.3 cm.   3. There is mild (grade 1) left ventricular diastolic dysfunction.   4. Normal right ventricular cavity size and normal right ventricular systolic function.   5. Trileaflet aortic valve. There is calcification of the aortic valve leaflets. Mild to moderate aortic stenosis.   6. Mild to moderate aortic regurgitation.   7. There is mild posterior calcification of the mitral valve annulus.   8. Mild mitral valve leaflet calcification.   9. Mild to moderate mitral regurgitation 3/10/25 normal sinus

## 2025-05-07 ENCOUNTER — OUTPATIENT (OUTPATIENT)
Dept: OUTPATIENT SERVICES | Facility: HOSPITAL | Age: 89
LOS: 1 days | End: 2025-05-07
Payer: MEDICARE

## 2025-05-07 ENCOUNTER — APPOINTMENT (OUTPATIENT)
Dept: GASTROENTEROLOGY | Facility: HOSPITAL | Age: 89
End: 2025-05-07

## 2025-05-07 ENCOUNTER — TRANSCRIPTION ENCOUNTER (OUTPATIENT)
Age: 89
End: 2025-05-07

## 2025-05-07 VITALS
HEART RATE: 70 BPM | SYSTOLIC BLOOD PRESSURE: 164 MMHG | RESPIRATION RATE: 18 BRPM | OXYGEN SATURATION: 100 % | DIASTOLIC BLOOD PRESSURE: 77 MMHG

## 2025-05-07 VITALS
HEART RATE: 74 BPM | TEMPERATURE: 97 F | DIASTOLIC BLOOD PRESSURE: 63 MMHG | SYSTOLIC BLOOD PRESSURE: 149 MMHG | OXYGEN SATURATION: 100 % | HEIGHT: 60 IN | WEIGHT: 132.06 LBS | RESPIRATION RATE: 15 BRPM

## 2025-05-07 DIAGNOSIS — K80.50 CALCULUS OF BILE DUCT WITHOUT CHOLANGITIS OR CHOLECYSTITIS WITHOUT OBSTRUCTION: ICD-10-CM

## 2025-05-07 DIAGNOSIS — Z90.49 ACQUIRED ABSENCE OF OTHER SPECIFIED PARTS OF DIGESTIVE TRACT: Chronic | ICD-10-CM

## 2025-05-07 DIAGNOSIS — Z96.659 PRESENCE OF UNSPECIFIED ARTIFICIAL KNEE JOINT: Chronic | ICD-10-CM

## 2025-05-07 DIAGNOSIS — Z98.890 OTHER SPECIFIED POSTPROCEDURAL STATES: Chronic | ICD-10-CM

## 2025-05-07 PROCEDURE — 43235 EGD DIAGNOSTIC BRUSH WASH: CPT | Mod: 59

## 2025-05-07 PROCEDURE — 43264 ERCP REMOVE DUCT CALCULI: CPT

## 2025-05-07 PROCEDURE — 43277 ERCP EA DUCT/AMPULLA DILATE: CPT | Mod: 59

## 2025-05-07 DEVICE — BLLN CRE DILATION 10-11-12MM: Type: IMPLANTABLE DEVICE | Status: FUNCTIONAL

## 2025-05-07 DEVICE — BLLN EXTRCTR PRO RX-S 9-12MM: Type: IMPLANTABLE DEVICE | Status: FUNCTIONAL

## 2025-05-07 DEVICE — GWIRE JAGTOME REVOLUTION RX 260CM/0.025IN: Type: IMPLANTABLE DEVICE | Status: FUNCTIONAL

## 2025-05-07 NOTE — ASU DISCHARGE PLAN (ADULT/PEDIATRIC) - FINANCIAL ASSISTANCE
[facility] provides services at a reduced cost to those who are determined to be eligible through [facility]’s financial assistance program. Information regarding [facility]’s financial assistance program can be found by going to [email or web address] or by calling [phone].

## 2025-05-07 NOTE — PRE PROCEDURE NOTE - PRE PROCEDURE EVALUATION
Attending Physician: Ivett                   Procedure: ERCP    Indication for Procedure: choledocholithiasis  ________________________________________________________  PAST MEDICAL & SURGICAL HISTORY:  HTN (hypertension)      HLD (hyperlipidemia)      Glaucoma      Calculus of bile duct      H/O cerebellar ataxia      Vertigo  h/o      Migraine      Renal cyst      Fatty liver      GERD (gastroesophageal reflux disease)      Dementia      History of cholecystectomy      S/P knee replacement  Right knee      H/O eye surgery        ALLERGIES:  No Known Allergies    HOME MEDICATIONS:  alendronate 35 mg oral tablet: 1 tab(s) orally once a week Tuesday  amLODIPine 5 mg oral tablet: 1 tab(s) orally once a day pm  atorvastatin 20 mg oral tablet: 1 tab(s) orally  donepezil 10 mg oral tablet: 1 tab(s) orally once a day am  fluticasone 50 mcg/inh nasal spray: 2 spray(s) in each nostril once a day  hydroCHLOROthiazide 25 mg oral tablet: 1 tab(s) orally once a day am  latanoprost 0.005% ophthalmic solution: 1 drop(s) in each eye once a day (at bedtime)  losartan 100 mg oral tablet: 1 tab(s) orally once a day pm  Patient takes multiple vitamins and supplements; Last dose 4/ 28/25:   QUEtiapine 25 mg oral tablet: 1 tab(s) orally once a day (at bedtime)    AICD/PPM: [ ] yes   [x ] no    PERTINENT LAB DATA:                      PHYSICAL EXAMINATION:    vitals wnl    Constitutional: NAD  Neck:  supple  Respiratory: no respiratory stress, no audible wheezes  Cardiovascular: RR  Gastrointestinal: soft, NT/ND  Extremities: No peripheral edema  Neurological: Alert, no focal deficits  Psychiatric: Normal mood, normal affect  Skin: No rashes      COMMENTS:    The patient is a suitable candidate for the planned procedure unless box checked [ ]  No, explain:

## 2025-05-16 ENCOUNTER — RX RENEWAL (OUTPATIENT)
Age: 89
End: 2025-05-16

## 2025-05-28 NOTE — ED ADULT NURSE NOTE - NS ED NOTE ABUSE RESPONSE YN
[de-identified] : Constitutional: Well-nourished, well-developed, No acute distress Respiratory:  Good respiratory effort, no SOB Lymphatic: No regional lymphadenopathy, no lymphedema Psychiatric: Pleasant and normal affect, alert and oriented x3 Skin: Clean dry and intact B/L UE Musculoskeletal: normal except where as noted in regional exam   Right Hand: APPEARANCE:  + swelling and ecchymoses over fifth digit, fifth MCP joint, as well as aches extending into the palmar aspect of the hand. no marked deformities or malalignment of the fingers POSITIVE TENDERNESS: + Significant TTP of the fifth digit PIP and MCP joints, moderate TTP of the fourth digit MCP joint and PIP joint NONTENDER: all other osseous and ligamentous structures.  ROM: Limited ROM of the 4th and 5th digits secondary to pain and stiffness, otherwise full & painless in CMC, MCP, and IP joints.  RESISTIVE TESTING: 3/5 of the involved digit, otherwise painless resisted testing.  SPECIAL TESTS: normal sensation of 1st through 5th digits, normal flex/ext, abd/add, and opposition of thumb, no triggering of fingers Vasc: 2+ radial pulse, normal capillary refill Neuro: AIN, PIN, Ulnar nerve intact to motor Sensation: Intact to light touch throughout  [de-identified] : I reviewed, interpreted and clinically correlated the following outside imaging studies, In system urgent care x-rays, 3 views of the right hand, no acute fracture seen, no malalignment Yes

## 2025-06-02 ENCOUNTER — APPOINTMENT (OUTPATIENT)
Dept: NEUROLOGY | Facility: CLINIC | Age: 89
End: 2025-06-02

## 2025-07-28 ENCOUNTER — APPOINTMENT (OUTPATIENT)
Dept: CARDIOLOGY | Facility: CLINIC | Age: 89
End: 2025-07-28

## 2025-07-28 DIAGNOSIS — R01.1 CARDIAC MURMUR, UNSPECIFIED: ICD-10-CM

## 2025-07-28 DIAGNOSIS — E78.00 PURE HYPERCHOLESTEROLEMIA, UNSPECIFIED: ICD-10-CM

## 2025-07-28 DIAGNOSIS — I05.0 RHEUMATIC MITRAL STENOSIS: ICD-10-CM

## 2025-07-28 DIAGNOSIS — I10 ESSENTIAL (PRIMARY) HYPERTENSION: ICD-10-CM

## 2025-07-28 DIAGNOSIS — Z13.6 ENCOUNTER FOR SCREENING FOR CARDIOVASCULAR DISORDERS: ICD-10-CM

## 2025-09-15 ENCOUNTER — APPOINTMENT (OUTPATIENT)
Dept: CARDIOLOGY | Facility: CLINIC | Age: 89
End: 2025-09-15

## (undated) DEVICE — SOL IRR POUR NS 0.9% 500ML

## (undated) DEVICE — ORGANIZER MIO MEDICAL DEVICE DISP

## (undated) DEVICE — GOWN LG

## (undated) DEVICE — DRSG CURITY GAUZE SPONGE 4 X 4" 12-PLY NON-STERILE

## (undated) DEVICE — TUBING SUCTION NONCONDUCTIVE 6MM X 12FT

## (undated) DEVICE — DRSG BANDAID 0.75X3"

## (undated) DEVICE — ELCTR ECG CONDUCTIVE ADHESIVE

## (undated) DEVICE — DRSG 2X2

## (undated) DEVICE — BITE BLOCK ADULT 20 X 27MM (GREEN)

## (undated) DEVICE — PACK IV START WITH CHG

## (undated) DEVICE — DVC AUTO CAP RX LOKG

## (undated) DEVICE — SENSOR O2 FINGER ADULT

## (undated) DEVICE — INJ SYS RAP REFIL

## (undated) DEVICE — SYR LUER LOK 3CC

## (undated) DEVICE — SOL INJ NS 0.9% 500ML 1-PORT

## (undated) DEVICE — SYR LUER LOK 10CC

## (undated) DEVICE — OMNIPAQUE 300  30ML

## (undated) DEVICE — UNDERPAD LINEN SAVER 17 X 24"

## (undated) DEVICE — NDL HYPO SAFE 22G X 1" (BLACK)

## (undated) DEVICE — DVC INFLATION CRE STERIFLATE

## (undated) DEVICE — SALIVA EJECTOR (BLUE)

## (undated) DEVICE — OLYMPUS DISTAL COVER ENDOSCOPE

## (undated) DEVICE — CATH IV SAFE BC 22G X 1" (BLUE)

## (undated) DEVICE — BASIN EMESIS 10IN GRADUATED MAUVE

## (undated) DEVICE — DENTURE CUP PINK